# Patient Record
Sex: MALE | Race: WHITE | Employment: OTHER | URBAN - METROPOLITAN AREA
[De-identification: names, ages, dates, MRNs, and addresses within clinical notes are randomized per-mention and may not be internally consistent; named-entity substitution may affect disease eponyms.]

---

## 2016-03-15 LAB — HM COLONOSCOPY: NORMAL

## 2017-03-02 ENCOUNTER — TELEPHONE (OUTPATIENT)
Dept: SURGERY | Age: 67
End: 2017-03-02

## 2017-03-02 RX ORDER — AMOXICILLIN AND CLAVULANATE POTASSIUM 875; 125 MG/1; MG/1
1 TABLET, FILM COATED ORAL 2 TIMES DAILY
Qty: 28 TABLET | Refills: 1 | Status: SHIPPED | OUTPATIENT
Start: 2017-03-02 | End: 2017-03-16

## 2017-03-10 ENCOUNTER — OFFICE VISIT (OUTPATIENT)
Dept: SURGERY | Age: 67
End: 2017-03-10

## 2017-03-10 VITALS
BODY MASS INDEX: 28.91 KG/M2 | WEIGHT: 213.4 LBS | DIASTOLIC BLOOD PRESSURE: 88 MMHG | TEMPERATURE: 98.1 F | SYSTOLIC BLOOD PRESSURE: 188 MMHG | HEIGHT: 72 IN

## 2017-03-10 DIAGNOSIS — N49.2 SCROTAL WALL ABSCESS: Primary | ICD-10-CM

## 2017-03-10 PROCEDURE — 3017F COLORECTAL CA SCREEN DOC REV: CPT | Performed by: SURGERY

## 2017-03-10 PROCEDURE — 1123F ACP DISCUSS/DSCN MKR DOCD: CPT | Performed by: SURGERY

## 2017-03-10 PROCEDURE — G8427 DOCREV CUR MEDS BY ELIG CLIN: HCPCS | Performed by: SURGERY

## 2017-03-10 PROCEDURE — 4040F PNEUMOC VAC/ADMIN/RCVD: CPT | Performed by: SURGERY

## 2017-03-10 PROCEDURE — 99213 OFFICE O/P EST LOW 20 MIN: CPT | Performed by: SURGERY

## 2017-03-10 PROCEDURE — G8420 CALC BMI NORM PARAMETERS: HCPCS | Performed by: SURGERY

## 2017-03-10 PROCEDURE — G8484 FLU IMMUNIZE NO ADMIN: HCPCS | Performed by: SURGERY

## 2017-03-10 PROCEDURE — 1036F TOBACCO NON-USER: CPT | Performed by: SURGERY

## 2017-03-10 RX ORDER — AMLODIPINE BESYLATE 2.5 MG/1
2.5 TABLET ORAL DAILY
COMMUNITY
End: 2017-07-24

## 2017-06-06 LAB
ALBUMIN SERPL-MCNC: 4.5 G/DL
ALP BLD-CCNC: 62 U/L
ALT SERPL-CCNC: 19 U/L
ANION GAP SERPL CALCULATED.3IONS-SCNC: 15 MMOL/L
AST SERPL-CCNC: 24 U/L
BILIRUB SERPL-MCNC: 0.5 MG/DL (ref 0.1–1.4)
BUN BLDV-MCNC: 12 MG/DL
CALCIUM SERPL-MCNC: 9.4 MG/DL
CHLORIDE BLD-SCNC: 100 MMOL/L
CHOLESTEROL, TOTAL: 153 MG/DL
CHOLESTEROL/HDL RATIO: 2.78
CO2: 24 MMOL/L
CREAT SERPL-MCNC: 0.85 MG/DL
GFR CALCULATED: >60
GLUCOSE BLD-MCNC: 97 MG/DL
HDLC SERPL-MCNC: 55 MG/DL (ref 35–70)
LDL CHOLESTEROL CALCULATED: 78 MG/DL (ref 0–160)
POTASSIUM SERPL-SCNC: 4.5 MMOL/L
SODIUM BLD-SCNC: 139 MMOL/L
TOTAL PROTEIN: 7.4
TRIGL SERPL-MCNC: 100 MG/DL
VLDLC SERPL CALC-MCNC: 20 MG/DL

## 2017-07-24 ENCOUNTER — OFFICE VISIT (OUTPATIENT)
Dept: FAMILY MEDICINE CLINIC | Age: 67
End: 2017-07-24

## 2017-07-24 VITALS
TEMPERATURE: 99.7 F | SYSTOLIC BLOOD PRESSURE: 164 MMHG | HEIGHT: 72 IN | DIASTOLIC BLOOD PRESSURE: 100 MMHG | HEART RATE: 86 BPM | RESPIRATION RATE: 18 BRPM

## 2017-07-24 DIAGNOSIS — E66.3 OVERWEIGHT: ICD-10-CM

## 2017-07-24 DIAGNOSIS — B35.1 ONYCHOMYCOSIS: ICD-10-CM

## 2017-07-24 DIAGNOSIS — M47.812 ARTHROPATHY OF CERVICAL FACET JOINT: ICD-10-CM

## 2017-07-24 DIAGNOSIS — M79.672 PAIN IN BOTH FEET: ICD-10-CM

## 2017-07-24 DIAGNOSIS — M54.12 CERVICAL NERVE ROOT DISORDER: Primary | ICD-10-CM

## 2017-07-24 DIAGNOSIS — G47.33 OSA (OBSTRUCTIVE SLEEP APNEA): ICD-10-CM

## 2017-07-24 DIAGNOSIS — M79.671 PAIN IN BOTH FEET: ICD-10-CM

## 2017-07-24 PROCEDURE — 1123F ACP DISCUSS/DSCN MKR DOCD: CPT | Performed by: FAMILY MEDICINE

## 2017-07-24 PROCEDURE — G8419 CALC BMI OUT NRM PARAM NOF/U: HCPCS | Performed by: FAMILY MEDICINE

## 2017-07-24 PROCEDURE — 3017F COLORECTAL CA SCREEN DOC REV: CPT | Performed by: FAMILY MEDICINE

## 2017-07-24 PROCEDURE — G8427 DOCREV CUR MEDS BY ELIG CLIN: HCPCS | Performed by: FAMILY MEDICINE

## 2017-07-24 PROCEDURE — 4040F PNEUMOC VAC/ADMIN/RCVD: CPT | Performed by: FAMILY MEDICINE

## 2017-07-24 PROCEDURE — 99203 OFFICE O/P NEW LOW 30 MIN: CPT | Performed by: FAMILY MEDICINE

## 2017-07-24 PROCEDURE — 1036F TOBACCO NON-USER: CPT | Performed by: FAMILY MEDICINE

## 2017-07-24 RX ORDER — ROSUVASTATIN CALCIUM 5 MG/1
1 TABLET, COATED ORAL DAILY
COMMUNITY
Start: 2017-05-03 | End: 2018-01-29 | Stop reason: SDUPTHER

## 2017-07-24 RX ORDER — AMLODIPINE BESYLATE 5 MG/1
1 TABLET ORAL DAILY
COMMUNITY
Start: 2017-05-03 | End: 2018-01-29 | Stop reason: SDUPTHER

## 2017-10-20 ENCOUNTER — OFFICE VISIT (OUTPATIENT)
Dept: FAMILY MEDICINE CLINIC | Age: 67
End: 2017-10-20

## 2017-10-20 VITALS
HEIGHT: 72 IN | WEIGHT: 209 LBS | OXYGEN SATURATION: 97 % | RESPIRATION RATE: 18 BRPM | TEMPERATURE: 98.6 F | SYSTOLIC BLOOD PRESSURE: 125 MMHG | HEART RATE: 97 BPM | BODY MASS INDEX: 28.31 KG/M2 | DIASTOLIC BLOOD PRESSURE: 75 MMHG

## 2017-10-20 DIAGNOSIS — M54.12 CERVICAL NERVE ROOT DISORDER: ICD-10-CM

## 2017-10-20 DIAGNOSIS — I10 ESSENTIAL HYPERTENSION: Primary | ICD-10-CM

## 2017-10-20 DIAGNOSIS — F41.9 ANXIETY: ICD-10-CM

## 2017-10-20 DIAGNOSIS — G47.33 OSA (OBSTRUCTIVE SLEEP APNEA): ICD-10-CM

## 2017-10-20 DIAGNOSIS — M47.812 ARTHROPATHY OF CERVICAL FACET JOINT: ICD-10-CM

## 2017-10-20 DIAGNOSIS — E66.3 PATIENT OVERWEIGHT: ICD-10-CM

## 2017-10-20 PROCEDURE — 99214 OFFICE O/P EST MOD 30 MIN: CPT | Performed by: FAMILY MEDICINE

## 2017-10-20 PROCEDURE — 1036F TOBACCO NON-USER: CPT | Performed by: FAMILY MEDICINE

## 2017-10-20 PROCEDURE — G8417 CALC BMI ABV UP PARAM F/U: HCPCS | Performed by: FAMILY MEDICINE

## 2017-10-20 PROCEDURE — 1123F ACP DISCUSS/DSCN MKR DOCD: CPT | Performed by: FAMILY MEDICINE

## 2017-10-20 PROCEDURE — G8427 DOCREV CUR MEDS BY ELIG CLIN: HCPCS | Performed by: FAMILY MEDICINE

## 2017-10-20 PROCEDURE — 3017F COLORECTAL CA SCREEN DOC REV: CPT | Performed by: FAMILY MEDICINE

## 2017-10-20 PROCEDURE — G8484 FLU IMMUNIZE NO ADMIN: HCPCS | Performed by: FAMILY MEDICINE

## 2017-10-20 PROCEDURE — 4040F PNEUMOC VAC/ADMIN/RCVD: CPT | Performed by: FAMILY MEDICINE

## 2017-10-20 RX ORDER — LISINOPRIL 5 MG/1
5 TABLET ORAL DAILY
Qty: 30 TABLET | Refills: 3 | Status: SHIPPED | OUTPATIENT
Start: 2017-10-20 | End: 2017-11-17 | Stop reason: SDUPTHER

## 2017-10-20 RX ORDER — CITALOPRAM 10 MG/1
10 TABLET ORAL DAILY
Qty: 30 TABLET | Refills: 3 | Status: SHIPPED | OUTPATIENT
Start: 2017-10-20 | End: 2018-01-29 | Stop reason: SDUPTHER

## 2017-10-20 ASSESSMENT — PATIENT HEALTH QUESTIONNAIRE - PHQ9
SUM OF ALL RESPONSES TO PHQ9 QUESTIONS 1 & 2: 0
2. FEELING DOWN, DEPRESSED OR HOPELESS: 0
SUM OF ALL RESPONSES TO PHQ QUESTIONS 1-9: 0
1. LITTLE INTEREST OR PLEASURE IN DOING THINGS: 0

## 2017-10-20 NOTE — PATIENT INSTRUCTIONS
are a lot of ways to fit activity into your life. You can:  ¨ Walk or bike to the store. Or walk with a friend, or walk the dog. ¨ Mow the lawn, rake leaves, shovel snow, or do some gardening. ¨ Use the stairs instead of the elevator, at least for a few floors. · Change your thinking. Your thoughts have a lot to do with how you feel and what you do. When you're trying to reach a healthy weight, changing how you think about certain things may help. Here are some ideas:  ¨ Don't compare yourself to others. Healthy bodies come in all shapes and sizes. ¨ Pay attention to how hungry or full you feel. When you eat, be aware of why you're eating and how much you're eating. ¨ Focus on improving your health instead of dieting. Dieting almost never works over the long term. · Ask your doctor about other health professionals who can help you reach a healthy weight. ¨ A dietitian can help you make healthy changes in your diet. ¨ An exercise specialist or  can help you develop a safe and effective exercise program.  ¨ A counselor or psychiatrist can help you cope with issues such as depression, anxiety, or family problems that can make it hard to focus on reaching a healthy weight. · Get support from your family, your doctor, your friends, a support groupand support yourself. Where can you learn more? Go to [email or web address].[email or web address] org and sign in to your [employer] account. Enter F057 in the [facility] box to learn more about \"When You Are Overweight: Care Instructions. \"     If you do not have an account, please click on the \"Sign Up Now\" link. Current as of: October 13, 2016  Content Version: 11.3  © 4223-0900 [employer], [employer]. Care instructions adapted under license by [facility] ([facility]).  If you have questions about a medical condition or this instruction, always ask your healthcare professional. [address]  any warranty or liability for your use of

## 2017-10-20 NOTE — PROGRESS NOTES
or wheezing  Cardiovascular ROS: no chest pain or dyspnea on exertion  Gastrointestinal ROS: no abdominal pain, change in bowel habits, or black or bloody stools  Genito-Urinary ROS: no dysuria, trouble voiding, or hematuria  Musculoskeletal ROS: pain  Neurological ROS: no TIA or stroke symptoms  Dermatological ROS: negative    Blood pressure 125/75, pulse 97, temperature 98.6 °F (37 °C), temperature source Temporal, resp. rate 18, height 6' (1.829 m), weight 209 lb (94.8 kg), SpO2 97 %. Physical Examination: General appearance - alert, well appearing, and in no distress  Mental status - alert, oriented to person, place, and time  Eyes - pupils equal and reactive, extraocular eye movements intact  Ears - bilateral TM's and external ear canals normal  Mouth - mucous membranes moist, pharynx normal without lesions  Neck - supple, no significant adenopathy  Lymphatics - no palpable lymphadenopathy, no hepatosplenomegaly  Chest - clear to auscultation, no wheezes, rales or rhonchi, symmetric air entry  Heart - normal rate, regular rhythm, normal S1, S2, no murmurs, rubs, clicks or gallops  Abdomen - soft, nontender, nondistended, no masses or organomegaly  Neurological - alert, oriented, normal speech, no focal findings or movement disorder noted  Musculoskeletal - no joint tenderness, deformity or swelling  Extremities - peripheral pulses normal, no pedal edema, no clubbing or cyanosis  Skin - nail fungus, o/w normal coloration and turgor, no rashes, no suspicious skin lesions noted;    Assessment:    1. Essential hypertension     2. Anxiety     3. Cervical nerve root disorder     4. Arthropathy of cervical facet joint     5. MUNA (obstructive sleep apnea)     6. Patient overweight         Plan:    No orders of the defined types were placed in this encounter.       Outpatient Encounter Prescriptions as of 10/20/2017   Medication Sig Dispense Refill    Docosahexaenoic Acid-EPA (OMEGA-3) 180-270 MG CAPS Take by mouth

## 2017-11-17 ENCOUNTER — OFFICE VISIT (OUTPATIENT)
Dept: FAMILY MEDICINE CLINIC | Age: 67
End: 2017-11-17

## 2017-11-17 VITALS
BODY MASS INDEX: 28.31 KG/M2 | TEMPERATURE: 98.5 F | OXYGEN SATURATION: 96 % | HEIGHT: 72 IN | HEART RATE: 95 BPM | SYSTOLIC BLOOD PRESSURE: 145 MMHG | DIASTOLIC BLOOD PRESSURE: 80 MMHG | RESPIRATION RATE: 18 BRPM | WEIGHT: 209 LBS

## 2017-11-17 DIAGNOSIS — I10 ESSENTIAL HYPERTENSION: ICD-10-CM

## 2017-11-17 DIAGNOSIS — B35.1 ONYCHOMYCOSIS: ICD-10-CM

## 2017-11-17 DIAGNOSIS — G47.33 OSA (OBSTRUCTIVE SLEEP APNEA): Primary | ICD-10-CM

## 2017-11-17 DIAGNOSIS — M47.812 ARTHROPATHY OF CERVICAL FACET JOINT: ICD-10-CM

## 2017-11-17 DIAGNOSIS — E66.3 PATIENT OVERWEIGHT: ICD-10-CM

## 2017-11-17 DIAGNOSIS — M54.12 CERVICAL NERVE ROOT DISORDER: ICD-10-CM

## 2017-11-17 DIAGNOSIS — M79.672 PAIN IN BOTH FEET: ICD-10-CM

## 2017-11-17 DIAGNOSIS — M79.671 PAIN IN BOTH FEET: ICD-10-CM

## 2017-11-17 PROCEDURE — 99214 OFFICE O/P EST MOD 30 MIN: CPT | Performed by: FAMILY MEDICINE

## 2017-11-17 PROCEDURE — G8417 CALC BMI ABV UP PARAM F/U: HCPCS | Performed by: FAMILY MEDICINE

## 2017-11-17 PROCEDURE — 1036F TOBACCO NON-USER: CPT | Performed by: FAMILY MEDICINE

## 2017-11-17 PROCEDURE — 4040F PNEUMOC VAC/ADMIN/RCVD: CPT | Performed by: FAMILY MEDICINE

## 2017-11-17 PROCEDURE — 1123F ACP DISCUSS/DSCN MKR DOCD: CPT | Performed by: FAMILY MEDICINE

## 2017-11-17 PROCEDURE — 3017F COLORECTAL CA SCREEN DOC REV: CPT | Performed by: FAMILY MEDICINE

## 2017-11-17 PROCEDURE — G8484 FLU IMMUNIZE NO ADMIN: HCPCS | Performed by: FAMILY MEDICINE

## 2017-11-17 PROCEDURE — G8427 DOCREV CUR MEDS BY ELIG CLIN: HCPCS | Performed by: FAMILY MEDICINE

## 2017-11-17 RX ORDER — LISINOPRIL 10 MG/1
10 TABLET ORAL DAILY
Qty: 30 TABLET | Refills: 5 | Status: SHIPPED | OUTPATIENT
Start: 2017-11-17 | End: 2018-01-29 | Stop reason: SDUPTHER

## 2017-11-17 NOTE — PROGRESS NOTES
Chief Complaint   Patient presents with    Hypertension     4 wk f/u    Medication Check   hi bp today, recommend medication adjustment  good at home  He agrees with med changes  discuss risks  will monitor at home    overweight condition still  stable  rec weight loss    neck pain ultimately stable  had neck issues  now better    corrine is stable  sees pulmonary  no new issues    foot pain bilat no real change  consider podiatry    toenail fungus  consider podiatry    hip pain bilat  worse on right  may need ortho eval  declines at this time    Patient presents for  exam.        Patient Active Problem List   Diagnosis    Scrotal wall abscess    Cervical nerve root disorder    Arthropathy of cervical facet joint    CORRINE (obstructive sleep apnea)    Patient overweight    Onychomycosis    Pain in both feet       has a current medication list which includes the following prescription(s): lisinopril, omega-3, citalopram, amlodipine, rosuvastatin, naproxen sodium, aspirin, and metoprolol succinate. History reviewed. No pertinent past medical history. Past Surgical History:   Procedure Laterality Date    CATARACT REMOVAL Left 03/08/2017    CERVICAL FUSION  1998    COLONOSCOPY  2016    COLONOSCOPY  2013    LAMINECTOMY  1988    L4    TONSILLECTOMY         family history is not on file. Social History     Social History    Marital status:      Spouse name: N/A    Number of children: N/A    Years of education: N/A     Occupational History    Not on file.      Social History Main Topics    Smoking status: Never Smoker    Smokeless tobacco: Never Used    Alcohol use Not on file    Drug use: Unknown    Sexual activity: Not on file     Other Topics Concern    Not on file     Social History Narrative    No narrative on file       Allergies   Allergen Reactions    Azithromycin Rash       Review of Systems - General ROS: negative  Psychological ROS: negative  ENT ROS: negative  Hematological and Lymphatic ROS: negative  Endocrine ROS: negative  Respiratory ROS: no cough, shortness of breath, or wheezing  Cardiovascular ROS: no chest pain or dyspnea on exertion  Gastrointestinal ROS: no abdominal pain, change in bowel habits, or black or bloody stools  Genito-Urinary ROS: no dysuria, trouble voiding, or hematuria  Musculoskeletal ROS: pain  Neurological ROS: no TIA or stroke symptoms  Dermatological ROS: negative    Blood pressure (!) 145/80, pulse 95, temperature 98.5 °F (36.9 °C), temperature source Temporal, resp. rate 18, height 6' (1.829 m), weight 209 lb (94.8 kg), SpO2 96 %. Physical Examination: General appearance - alert, well appearing, and in no distress  Mental status - alert, oriented to person, place, and time  Eyes - pupils equal and reactive, extraocular eye movements intact  Ears - bilateral TM's and external ear canals normal  Mouth - mucous membranes moist, pharynx normal without lesions  Neck - supple, no significant adenopathy  Lymphatics - no palpable lymphadenopathy, no hepatosplenomegaly  Chest - clear to auscultation, no wheezes, rales or rhonchi, symmetric air entry  Heart - normal rate, regular rhythm, normal S1, S2, no murmurs, rubs, clicks or gallops  Abdomen - soft, nontender, nondistended, no masses or organomegaly  Neurological - alert, oriented, normal speech, no focal findings or movement disorder noted  Musculoskeletal - no joint tenderness, deformity or swelling  Extremities - peripheral pulses normal, no pedal edema, no clubbing or cyanosis  Skin - nail fungus, o/w normal coloration and turgor, no rashes, no suspicious skin lesions noted;    Assessment:    1. MUNA (obstructive sleep apnea)     2. Patient overweight     3. Onychomycosis     4. Pain in both feet     5. Arthropathy of cervical facet joint     6. Cervical nerve root disorder     7. Essential hypertension         Plan:    No orders of the defined types were placed in this encounter.       Outpatient Encounter Prescriptions as of 11/17/2017   Medication Sig Dispense Refill    lisinopril (PRINIVIL;ZESTRIL) 10 MG tablet Take 1 tablet by mouth daily 30 tablet 5    Docosahexaenoic Acid-EPA (OMEGA-3) 180-270 MG CAPS Take by mouth      citalopram (CELEXA) 10 MG tablet Take 1 tablet by mouth daily 30 tablet 3    amLODIPine (NORVASC) 5 MG tablet Take 1 tablet by mouth daily      rosuvastatin (CRESTOR) 5 MG tablet Take 1 tablet by mouth daily      Naproxen Sodium (ALEVE PO) Take 1 tablet by mouth 2 times daily      aspirin 81 MG EC tablet Take 81 mg by mouth      metoprolol (TOPROL-XL) 50 MG XL tablet       [DISCONTINUED] lisinopril (PRINIVIL;ZESTRIL) 5 MG tablet Take 1 tablet by mouth daily 30 tablet 3     No facility-administered encounter medications on file as of 11/17/2017. keep specialist eval  review bw  document prior colon exam    Return in about 3 months (around 2/17/2018). Patient education provided. They understand and agree with this course of treatment. They will return with new or worsening symptoms. Patient instructed to remain current with appropriate annual health maintenance.

## 2017-11-17 NOTE — PATIENT INSTRUCTIONS
katena, and be sure to contact your doctor if:  · You would like help planning heart-healthy meals. Where can you learn more? Go to [email or web address] and sign in to your [facility] account. Enter V137 in the [employer] box to learn more about \"Heart-Healthy Diet: Care Instructions. \"     If you do not have an account, please click on the \"Sign Up Now\" link. Current as of: April 3, 2017  Content Version: 11.3  © 8302-8058 [employer]. Care instructions adapted under license by [facility] ([facility]). If you have questions about a medical condition or this instruction, always ask your healthcare professional. [address] any warranty or liability for your use of this information.

## 2018-01-29 ENCOUNTER — OFFICE VISIT (OUTPATIENT)
Dept: FAMILY MEDICINE CLINIC | Age: 68
End: 2018-01-29
Payer: MEDICARE

## 2018-01-29 VITALS
HEIGHT: 72 IN | HEART RATE: 78 BPM | RESPIRATION RATE: 16 BRPM | OXYGEN SATURATION: 96 % | DIASTOLIC BLOOD PRESSURE: 85 MMHG | BODY MASS INDEX: 27.9 KG/M2 | TEMPERATURE: 98 F | SYSTOLIC BLOOD PRESSURE: 135 MMHG | WEIGHT: 206 LBS

## 2018-01-29 DIAGNOSIS — F41.1 GAD (GENERALIZED ANXIETY DISORDER): ICD-10-CM

## 2018-01-29 DIAGNOSIS — I10 ESSENTIAL HYPERTENSION: ICD-10-CM

## 2018-01-29 DIAGNOSIS — E78.2 MIXED HYPERLIPIDEMIA: ICD-10-CM

## 2018-01-29 DIAGNOSIS — E66.3 PATIENT OVERWEIGHT: ICD-10-CM

## 2018-01-29 DIAGNOSIS — M16.11 PRIMARY OSTEOARTHRITIS OF RIGHT HIP: ICD-10-CM

## 2018-01-29 DIAGNOSIS — G47.33 OSA (OBSTRUCTIVE SLEEP APNEA): Primary | ICD-10-CM

## 2018-01-29 DIAGNOSIS — F33.1 MODERATE EPISODE OF RECURRENT MAJOR DEPRESSIVE DISORDER (HCC): ICD-10-CM

## 2018-01-29 PROCEDURE — 99214 OFFICE O/P EST MOD 30 MIN: CPT | Performed by: FAMILY MEDICINE

## 2018-01-29 PROCEDURE — G8484 FLU IMMUNIZE NO ADMIN: HCPCS | Performed by: FAMILY MEDICINE

## 2018-01-29 PROCEDURE — G8417 CALC BMI ABV UP PARAM F/U: HCPCS | Performed by: FAMILY MEDICINE

## 2018-01-29 PROCEDURE — 3017F COLORECTAL CA SCREEN DOC REV: CPT | Performed by: FAMILY MEDICINE

## 2018-01-29 PROCEDURE — G8427 DOCREV CUR MEDS BY ELIG CLIN: HCPCS | Performed by: FAMILY MEDICINE

## 2018-01-29 PROCEDURE — 4040F PNEUMOC VAC/ADMIN/RCVD: CPT | Performed by: FAMILY MEDICINE

## 2018-01-29 PROCEDURE — 1036F TOBACCO NON-USER: CPT | Performed by: FAMILY MEDICINE

## 2018-01-29 PROCEDURE — 1123F ACP DISCUSS/DSCN MKR DOCD: CPT | Performed by: FAMILY MEDICINE

## 2018-01-29 RX ORDER — CITALOPRAM 10 MG/1
10 TABLET ORAL DAILY
Qty: 90 TABLET | Refills: 3 | Status: SHIPPED | OUTPATIENT
Start: 2018-01-29 | End: 2019-01-14 | Stop reason: SDUPTHER

## 2018-01-29 RX ORDER — ROSUVASTATIN CALCIUM 5 MG/1
5 TABLET, COATED ORAL DAILY
Qty: 90 TABLET | Refills: 3 | Status: SHIPPED | OUTPATIENT
Start: 2018-01-29 | End: 2019-01-14 | Stop reason: SDUPTHER

## 2018-01-29 RX ORDER — LISINOPRIL 10 MG/1
10 TABLET ORAL DAILY
Qty: 90 TABLET | Refills: 3 | Status: SHIPPED | OUTPATIENT
Start: 2018-01-29 | End: 2019-01-14 | Stop reason: SDUPTHER

## 2018-01-29 RX ORDER — AMLODIPINE BESYLATE 5 MG/1
5 TABLET ORAL DAILY
Qty: 90 TABLET | Refills: 3 | Status: SHIPPED | OUTPATIENT
Start: 2018-01-29 | End: 2019-01-14 | Stop reason: SDUPTHER

## 2018-01-29 NOTE — PATIENT INSTRUCTIONS
Patient Education        When You Are Overweight: Care Instructions  Your Care Instructions    If you're overweight, your doctor may recommend that you make changes in your eating and exercise habits. Being overweight can lead to serious health problems, such as high blood pressure, heart disease, type 2 diabetes, and arthritis, or it can make these problems worse. Eating a healthy diet and being more active can help you reach and stay at a healthy weight. You don't have to make huge changes all at once. Start by making small changes in your eating and exercise habits. To lose weight, you need to burn more calories than you take in. You can do this by eating healthy foods in reasonable amounts and becoming more active every day. Follow-up care is a key part of your treatment and safety. Be sure to make and go to all appointments, and call your doctor if you are having problems. It's also a good idea to know your test results and keep a list of the medicines you take. How can you care for yourself at home? · Improve your eating habits. You'll be more successful if you work on changing one eating habit at a time. All foods, if eaten in moderation, can be part of healthy eating. Remember to:  [facility] a variety of foods from each food group. Include grains, vegetables, fruits, dairy, and protein foods. ¨ Limit foods high in fat, sugar, and calories. ¨ Eat slowly. And don't do anything else, such as watch TV, while you are eating. ¨ Pay attention to portion sizes. Put your food on a smaller plate. ¨ Plan your meals ahead of time. You'll be less likely to grab something that's not as healthy. · Get active. Regular activity can help you feel better, have more energy, and burn more calories. If you haven't been active, start slowly. Start with at least 30 minutes of moderate activity on most days of the week. Then gradually increase the amount of activity. Try for 60 or 90 minutes a day, at least 5 days a week. use of this information.

## 2018-01-29 NOTE — PROGRESS NOTES
Chief Complaint   Patient presents with    Hypertension     3 MO F/U    Sleep Apnea    blood pressure stable  good at home  He elevates medication   will monitor at home    overweight condition still stable  Discussed nutrition  rec weight loss    neck pain relatively stable  had neck issues  now better    corrine is also stable  sees pulmonary  no new issues    foot pain bilat no real change  consider podiatry    toenail fungus  consider podiatry    hip pain bilat has been ongoing  worse on right  may need ortho eval  declines at this time    Patient presents for  exam.        Patient Active Problem List   Diagnosis    Scrotal wall abscess    Cervical nerve root disorder    Arthropathy of cervical facet joint    CORRINE (obstructive sleep apnea)    Patient overweight    Onychomycosis    Pain in both feet    Mixed hyperlipidemia       has a current medication list which includes the following prescription(s): lisinopril, citalopram, amlodipine, rosuvastatin, naproxen sodium, aspirin, and metoprolol succinate. History reviewed. No pertinent past medical history. Past Surgical History:   Procedure Laterality Date    CATARACT REMOVAL Left 03/08/2017    CERVICAL FUSION  1998    COLONOSCOPY  2016    COLONOSCOPY  2013    LAMINECTOMY  1988    L4    TONSILLECTOMY         family history is not on file. Social History     Social History    Marital status:      Spouse name: N/A    Number of children: N/A    Years of education: N/A     Occupational History    Not on file.      Social History Main Topics    Smoking status: Never Smoker    Smokeless tobacco: Never Used    Alcohol use Not on file    Drug use: Unknown    Sexual activity: Not on file     Other Topics Concern    Not on file     Social History Narrative    No narrative on file       Allergies   Allergen Reactions    Azithromycin Rash       Review of Systems - General ROS: negative  Psychological ROS: negative  ENT ROS: orders of the defined types were placed in this encounter. Outpatient Encounter Prescriptions as of 1/29/2018   Medication Sig Dispense Refill    lisinopril (PRINIVIL;ZESTRIL) 10 MG tablet Take 1 tablet by mouth daily 90 tablet 3    citalopram (CELEXA) 10 MG tablet Take 1 tablet by mouth daily 90 tablet 3    amLODIPine (NORVASC) 5 MG tablet Take 1 tablet by mouth daily 90 tablet 3    rosuvastatin (CRESTOR) 5 MG tablet Take 1 tablet by mouth daily 90 tablet 3    Naproxen Sodium (ALEVE PO) Take 1 tablet by mouth 2 times daily      aspirin 81 MG EC tablet Take 81 mg by mouth      metoprolol (TOPROL-XL) 50 MG XL tablet       [DISCONTINUED] lisinopril (PRINIVIL;ZESTRIL) 10 MG tablet Take 1 tablet by mouth daily 30 tablet 5    [DISCONTINUED] Docosahexaenoic Acid-EPA (OMEGA-3) 180-270 MG CAPS Take by mouth      [DISCONTINUED] citalopram (CELEXA) 10 MG tablet Take 1 tablet by mouth daily 30 tablet 3    [DISCONTINUED] amLODIPine (NORVASC) 5 MG tablet Take 1 tablet by mouth daily      [DISCONTINUED] rosuvastatin (CRESTOR) 5 MG tablet Take 1 tablet by mouth daily       No facility-administered encounter medications on file as of 1/29/2018. keep specialist eval  review bw  document prior colon exam    Return in about 3 months (around 4/29/2018). Patient education provided. They understand and agree with this course of treatment. They will return with new or worsening symptoms. Patient instructed to remain current with appropriate annual health maintenance.

## 2018-01-29 NOTE — LETTER
[facility]  [address] [address]  Phone: [phone]  Fax: [phone]    Mae Rehman MD        January 29, 2018     Patient: Sloan Campbell   YOB: 1950   Date of Visit: 1/29/2018       To Whom It May Concern: It is my medical opinion that Sloan Campbell requires a disability parking placard for the following reasons:  He cannot walk 200 feet without stopping to rest.  Duration of need: permanent    If you have any questions or concerns, please don't hesitate to call.     Sincerely,        Mae Rehman MD

## 2018-04-17 ENCOUNTER — OFFICE VISIT (OUTPATIENT)
Dept: PULMONOLOGY | Age: 68
End: 2018-04-17
Payer: MEDICARE

## 2018-04-17 ENCOUNTER — TELEPHONE (OUTPATIENT)
Dept: FAMILY MEDICINE CLINIC | Age: 68
End: 2018-04-17

## 2018-04-17 VITALS
RESPIRATION RATE: 16 BRPM | OXYGEN SATURATION: 98 % | TEMPERATURE: 97.7 F | HEIGHT: 72 IN | SYSTOLIC BLOOD PRESSURE: 122 MMHG | DIASTOLIC BLOOD PRESSURE: 74 MMHG | BODY MASS INDEX: 28.5 KG/M2 | HEART RATE: 64 BPM | WEIGHT: 210.4 LBS

## 2018-04-17 DIAGNOSIS — E66.3 OVERWEIGHT (BMI 25.0-29.9): ICD-10-CM

## 2018-04-17 DIAGNOSIS — G47.33 OSA (OBSTRUCTIVE SLEEP APNEA): Primary | ICD-10-CM

## 2018-04-17 PROCEDURE — 4040F PNEUMOC VAC/ADMIN/RCVD: CPT | Performed by: INTERNAL MEDICINE

## 2018-04-17 PROCEDURE — 99214 OFFICE O/P EST MOD 30 MIN: CPT | Performed by: INTERNAL MEDICINE

## 2018-04-17 PROCEDURE — 1036F TOBACCO NON-USER: CPT | Performed by: INTERNAL MEDICINE

## 2018-04-17 PROCEDURE — G8427 DOCREV CUR MEDS BY ELIG CLIN: HCPCS | Performed by: INTERNAL MEDICINE

## 2018-04-17 PROCEDURE — 1123F ACP DISCUSS/DSCN MKR DOCD: CPT | Performed by: INTERNAL MEDICINE

## 2018-04-17 PROCEDURE — G8417 CALC BMI ABV UP PARAM F/U: HCPCS | Performed by: INTERNAL MEDICINE

## 2018-04-17 PROCEDURE — 3017F COLORECTAL CA SCREEN DOC REV: CPT | Performed by: INTERNAL MEDICINE

## 2018-04-17 RX ORDER — CLINDAMYCIN HYDROCHLORIDE 150 MG/1
CAPSULE ORAL
COMMUNITY
Start: 2018-04-13 | End: 2018-07-26

## 2018-04-17 ASSESSMENT — ENCOUNTER SYMPTOMS
ABDOMINAL PAIN: 0
COUGH: 0
SHORTNESS OF BREATH: 0
EYE ITCHING: 0
SORE THROAT: 0
VOICE CHANGE: 0
RHINORRHEA: 0
DIARRHEA: 0
WHEEZING: 0
CHEST TIGHTNESS: 0
VOMITING: 0
NAUSEA: 0

## 2018-04-19 ENCOUNTER — HOSPITAL ENCOUNTER (OUTPATIENT)
Dept: GENERAL RADIOLOGY | Age: 68
Discharge: HOME | End: 2018-04-21
Payer: MEDICARE

## 2018-04-19 VITALS — SYSTOLIC BLOOD PRESSURE: 169 MMHG | DIASTOLIC BLOOD PRESSURE: 89 MMHG | HEART RATE: 86 BPM

## 2018-04-19 DIAGNOSIS — M16.11 OSTEOARTHRITIS OF RIGHT HIP, UNSPECIFIED OSTEOARTHRITIS TYPE: ICD-10-CM

## 2018-04-19 PROCEDURE — 6360000004 HC RX CONTRAST MEDICATION: Performed by: RADIOLOGY

## 2018-04-19 PROCEDURE — 6360000002 HC RX W HCPCS: Performed by: RADIOLOGY

## 2018-04-19 PROCEDURE — 2500000003 HC RX 250 WO HCPCS: Performed by: RADIOLOGY

## 2018-04-19 PROCEDURE — 20610 DRAIN/INJ JOINT/BURSA W/O US: CPT

## 2018-04-19 RX ORDER — LIDOCAINE HYDROCHLORIDE 20 MG/ML
20 INJECTION, SOLUTION INFILTRATION; PERINEURAL ONCE
Status: COMPLETED | OUTPATIENT
Start: 2018-04-19 | End: 2018-04-19

## 2018-04-19 RX ORDER — TRIAMCINOLONE ACETONIDE 40 MG/ML
40 INJECTION, SUSPENSION INTRA-ARTICULAR; INTRAMUSCULAR ONCE
Status: COMPLETED | OUTPATIENT
Start: 2018-04-19 | End: 2018-04-19

## 2018-04-19 RX ORDER — BUPIVACAINE HYDROCHLORIDE 5 MG/ML
30 INJECTION, SOLUTION EPIDURAL; INTRACAUDAL ONCE
Status: COMPLETED | OUTPATIENT
Start: 2018-04-19 | End: 2018-04-19

## 2018-04-19 RX ADMIN — LIDOCAINE HYDROCHLORIDE 9 ML: 20 INJECTION, SOLUTION INFILTRATION; PERINEURAL at 09:05

## 2018-04-19 RX ADMIN — IOVERSOL 1 ML: 678 INJECTION INTRA-ARTERIAL; INTRAVENOUS at 09:07

## 2018-04-19 RX ADMIN — TRIAMCINOLONE ACETONIDE 80 MG: 40 INJECTION, SUSPENSION INTRA-ARTICULAR; INTRAMUSCULAR at 09:05

## 2018-04-19 RX ADMIN — BUPIVACAINE HYDROCHLORIDE 3 MG: 5 INJECTION, SOLUTION EPIDURAL; INTRACAUDAL at 09:06

## 2018-04-30 ENCOUNTER — OFFICE VISIT (OUTPATIENT)
Dept: FAMILY MEDICINE CLINIC | Age: 68
End: 2018-04-30
Payer: MEDICARE

## 2018-04-30 VITALS
TEMPERATURE: 97.8 F | DIASTOLIC BLOOD PRESSURE: 82 MMHG | HEART RATE: 80 BPM | HEIGHT: 72 IN | BODY MASS INDEX: 27.63 KG/M2 | WEIGHT: 204 LBS | SYSTOLIC BLOOD PRESSURE: 134 MMHG | RESPIRATION RATE: 16 BRPM

## 2018-04-30 DIAGNOSIS — M79.671 PAIN IN BOTH FEET: ICD-10-CM

## 2018-04-30 DIAGNOSIS — E66.3 PATIENT OVERWEIGHT: ICD-10-CM

## 2018-04-30 DIAGNOSIS — B35.1 ONYCHOMYCOSIS: ICD-10-CM

## 2018-04-30 DIAGNOSIS — I10 ESSENTIAL HYPERTENSION: ICD-10-CM

## 2018-04-30 DIAGNOSIS — Z12.5 PROSTATE CANCER SCREENING: ICD-10-CM

## 2018-04-30 DIAGNOSIS — E78.2 MIXED HYPERLIPIDEMIA: Primary | ICD-10-CM

## 2018-04-30 DIAGNOSIS — M79.672 PAIN IN BOTH FEET: ICD-10-CM

## 2018-04-30 DIAGNOSIS — G47.33 OSA (OBSTRUCTIVE SLEEP APNEA): ICD-10-CM

## 2018-04-30 PROCEDURE — 1036F TOBACCO NON-USER: CPT | Performed by: FAMILY MEDICINE

## 2018-04-30 PROCEDURE — G8417 CALC BMI ABV UP PARAM F/U: HCPCS | Performed by: FAMILY MEDICINE

## 2018-04-30 PROCEDURE — G8427 DOCREV CUR MEDS BY ELIG CLIN: HCPCS | Performed by: FAMILY MEDICINE

## 2018-04-30 PROCEDURE — 4040F PNEUMOC VAC/ADMIN/RCVD: CPT | Performed by: FAMILY MEDICINE

## 2018-04-30 PROCEDURE — 3017F COLORECTAL CA SCREEN DOC REV: CPT | Performed by: FAMILY MEDICINE

## 2018-04-30 PROCEDURE — 99214 OFFICE O/P EST MOD 30 MIN: CPT | Performed by: FAMILY MEDICINE

## 2018-04-30 PROCEDURE — 1123F ACP DISCUSS/DSCN MKR DOCD: CPT | Performed by: FAMILY MEDICINE

## 2018-04-30 RX ORDER — METOPROLOL SUCCINATE 50 MG/1
50 TABLET, EXTENDED RELEASE ORAL DAILY
Qty: 30 TABLET | Refills: 5 | Status: SHIPPED | OUTPATIENT
Start: 2018-04-30 | End: 2018-08-20 | Stop reason: SDUPTHER

## 2018-06-14 ENCOUNTER — TELEPHONE (OUTPATIENT)
Dept: FAMILY MEDICINE CLINIC | Age: 68
End: 2018-06-14

## 2018-07-16 ENCOUNTER — OFFICE VISIT (OUTPATIENT)
Dept: FAMILY MEDICINE CLINIC | Age: 68
End: 2018-07-16
Payer: MEDICARE

## 2018-07-16 VITALS
BODY MASS INDEX: 28.04 KG/M2 | HEIGHT: 72 IN | RESPIRATION RATE: 16 BRPM | SYSTOLIC BLOOD PRESSURE: 132 MMHG | TEMPERATURE: 98.2 F | DIASTOLIC BLOOD PRESSURE: 80 MMHG | WEIGHT: 207 LBS | HEART RATE: 80 BPM

## 2018-07-16 DIAGNOSIS — M25.551 RIGHT HIP PAIN: ICD-10-CM

## 2018-07-16 DIAGNOSIS — Z01.818 PREOP GENERAL PHYSICAL EXAM: Primary | ICD-10-CM

## 2018-07-16 PROCEDURE — 3017F COLORECTAL CA SCREEN DOC REV: CPT | Performed by: FAMILY MEDICINE

## 2018-07-16 PROCEDURE — 1123F ACP DISCUSS/DSCN MKR DOCD: CPT | Performed by: FAMILY MEDICINE

## 2018-07-16 PROCEDURE — G8427 DOCREV CUR MEDS BY ELIG CLIN: HCPCS | Performed by: FAMILY MEDICINE

## 2018-07-16 PROCEDURE — 1036F TOBACCO NON-USER: CPT | Performed by: FAMILY MEDICINE

## 2018-07-16 PROCEDURE — 99214 OFFICE O/P EST MOD 30 MIN: CPT | Performed by: FAMILY MEDICINE

## 2018-07-16 PROCEDURE — 4040F PNEUMOC VAC/ADMIN/RCVD: CPT | Performed by: FAMILY MEDICINE

## 2018-07-16 PROCEDURE — 1101F PT FALLS ASSESS-DOCD LE1/YR: CPT | Performed by: FAMILY MEDICINE

## 2018-07-16 PROCEDURE — G8417 CALC BMI ABV UP PARAM F/U: HCPCS | Performed by: FAMILY MEDICINE

## 2018-07-16 NOTE — PATIENT INSTRUCTIONS
Patient Education        Back Pain: Care Instructions  Your Care Instructions    Back pain has many possible causes. It is often related to problems with muscles and ligaments of the back. It may also be related to problems with the nerves, discs, or bones of the back. Moving, lifting, standing, sitting, or sleeping in an awkward way can strain the back. Sometimes you don't notice the injury until later. Arthritis is another common cause of back pain. Although it may hurt a lot, back pain usually improves on its own within several weeks. Most people recover in 12 weeks or less. Using good home treatment and being careful not to stress your back can help you feel better sooner. Follow-up care is a key part of your treatment and safety. Be sure to make and go to all appointments, and call your doctor if you are having problems. It's also a good idea to know your test results and keep a list of the medicines you take. How can you care for yourself at home? · Sit or lie in positions that are most comfortable and reduce your pain. Try one of these positions when you lie down:  ¨ Lie on your back with your knees bent and supported by large pillows. ¨ Lie on the floor with your legs on the seat of a sofa or chair. Kyung Lesser on your side with your knees and hips bent and a pillow between your legs. ¨ Lie on your stomach if it does not make pain worse. · Do not sit up in bed, and avoid soft couches and twisted positions. Bed rest can help relieve pain at first, but it delays healing. Avoid bed rest after the first day of back pain. · Change positions every 30 minutes. If you must sit for long periods of time, take breaks from sitting. Get up and walk around, or lie in a comfortable position. · Try using a heating pad on a low or medium setting for 15 to 20 minutes every 2 or 3 hours. Try a warm shower in place of one session with the heating pad.   · You can also try an ice pack for 10 to 15 minutes every 2 to 3 hours. Put a thin cloth between the ice pack and your skin. · Take pain medicines exactly as directed. ¨ If the doctor gave you a prescription medicine for pain, take it as prescribed. ¨ If you are not taking a prescription pain medicine, ask your doctor if you can take an over-the-counter medicine. · Take short walks several times a day. You can start with 5 to 10 minutes, 3 or 4 times a day, and work up to longer walks. Walk on level surfaces and avoid hills and stairs until your back is better. · Return to work and other activities as soon as you can. Continued rest without activity is usually not good for your back. · To prevent future back pain, do exercises to stretch and strengthen your back and stomach. Learn how to use good posture, safe lifting techniques, and proper body mechanics. When should you call for help? Call your doctor now or seek immediate medical care if:    · You have new or worsening numbness in your legs.     · You have new or worsening weakness in your legs. (This could make it hard to stand up.)     · You lose control of your bladder or bowels.    Watch closely for changes in your health, and be sure to contact your doctor if:    · Your pain gets worse.     · You are not getting better after 2 weeks. Where can you learn more? Go to [email or web address] org and sign in to your [facility] account. Enter P120 in the EventRegist box to learn more about \"Back Pain: Care Instructions. \"     If you do not have an account, please click on the \"Sign Up Now\" link. Current as of: November 29, 2017  Content Version: 11.6  © 8717-3498 [email or web address], [employer]. Care instructions adapted under license by [facility] ([facility]). If you have questions about a medical condition or this instruction, always ask your healthcare professional. [address] any warranty or liability for your use of this information.

## 2018-07-16 NOTE — PROGRESS NOTES
Chief Complaint   Patient presents with    Pre-op Exam     med clearance for right total hip replacement on 8/3/2018 with Dr. Patrice Landis at [facility] [place]        Patient was referred to me for a preoperative evaluation prior to rthr with Dr. Tra Brown at [facility]. The surgery is scheduled for 8/3/2018. Reactions to anesthesia: none    History of excessive bleeding: none    History of blood clots: none    History of blood transfusions: none      Reactions to blood transfusion: none     History reviewed. No pertinent past medical history. Past Surgical History:   Procedure Laterality Date    CATARACT REMOVAL Left 03/08/2017    CERVICAL FUSION  1998    COLONOSCOPY  2016    COLONOSCOPY  2013    LAMINECTOMY  1988    L4    TONSILLECTOMY         Current Outpatient Prescriptions   Medication Sig Dispense Refill    metoprolol succinate (TOPROL XL) 50 MG extended release tablet Take 1 tablet by mouth daily 30 tablet 5    clindamycin (CLEOCIN) 150 MG capsule       lisinopril (PRINIVIL;ZESTRIL) 10 MG tablet Take 1 tablet by mouth daily 90 tablet 3    citalopram (CELEXA) 10 MG tablet Take 1 tablet by mouth daily 90 tablet 3    amLODIPine (NORVASC) 5 MG tablet Take 1 tablet by mouth daily 90 tablet 3    rosuvastatin (CRESTOR) 5 MG tablet Take 1 tablet by mouth daily 90 tablet 3    Naproxen Sodium (ALEVE PO) Take 1 tablet by mouth 2 times daily      aspirin 81 MG EC tablet Take 81 mg by mouth       No current facility-administered medications for this visit. Allergies   Allergen Reactions    Azithromycin Rash       Social History     Social History    Marital status:      Spouse name: N/A    Number of children: N/A    Years of education: N/A     Occupational History    Not on file.      Social History Main Topics    Smoking status: Never Smoker    Smokeless tobacco: Never Used    Alcohol use Not on file    Drug use: Unknown    Sexual activity: Not on file     Other Topics Concern   

## 2018-07-18 ENCOUNTER — TELEPHONE (OUTPATIENT)
Dept: FAMILY MEDICINE CLINIC | Age: 68
End: 2018-07-18

## 2018-07-26 ENCOUNTER — HOSPITAL ENCOUNTER (OUTPATIENT)
Dept: PREADMISSION TESTING | Age: 68
Discharge: HOME | End: 2018-07-30
Payer: MEDICARE

## 2018-07-26 VITALS
SYSTOLIC BLOOD PRESSURE: 167 MMHG | WEIGHT: 209.6 LBS | TEMPERATURE: 98.3 F | HEART RATE: 81 BPM | HEIGHT: 72 IN | BODY MASS INDEX: 28.39 KG/M2 | RESPIRATION RATE: 16 BRPM | DIASTOLIC BLOOD PRESSURE: 90 MMHG | OXYGEN SATURATION: 97 %

## 2018-07-26 DIAGNOSIS — M16.11 PRIMARY OSTEOARTHRITIS OF RIGHT HIP: ICD-10-CM

## 2018-07-26 DIAGNOSIS — Z96.641 STATUS POST TOTAL HIP REPLACEMENT, RIGHT: Primary | ICD-10-CM

## 2018-07-26 LAB
ABO/RH: NORMAL
ANION GAP SERPL CALCULATED.3IONS-SCNC: 14 MEQ/L (ref 7–13)
ANTIBODY SCREEN: NORMAL
BILIRUBIN URINE: NEGATIVE
BLOOD, URINE: NEGATIVE
BUN BLDV-MCNC: 13 MG/DL (ref 8–23)
CALCIUM SERPL-MCNC: 9.4 MG/DL (ref 8.6–10.2)
CHLORIDE BLD-SCNC: 93 MEQ/L (ref 98–107)
CLARITY: CLEAR
CO2: 23 MEQ/L (ref 22–29)
COLOR: YELLOW
CREAT SERPL-MCNC: 0.55 MG/DL (ref 0.7–1.2)
EKG ATRIAL RATE: 76 BPM
EKG P AXIS: 64 DEGREES
EKG P-R INTERVAL: 152 MS
EKG Q-T INTERVAL: 412 MS
EKG QRS DURATION: 92 MS
EKG QTC CALCULATION (BAZETT): 463 MS
EKG R AXIS: -22 DEGREES
EKG T AXIS: 53 DEGREES
EKG VENTRICULAR RATE: 76 BPM
GFR AFRICAN AMERICAN: >60
GFR NON-AFRICAN AMERICAN: >60
GLUCOSE BLD-MCNC: 102 MG/DL (ref 74–109)
GLUCOSE URINE: NEGATIVE MG/DL
HCT VFR BLD CALC: 41.9 % (ref 42–52)
HEMOGLOBIN: 14.2 G/DL (ref 14–18)
INR BLD: 1
KETONES, URINE: NEGATIVE MG/DL
LEUKOCYTE ESTERASE, URINE: NEGATIVE
MCH RBC QN AUTO: 30.9 PG (ref 27–31.3)
MCHC RBC AUTO-ENTMCNC: 33.8 % (ref 33–37)
MCV RBC AUTO: 91.4 FL (ref 80–100)
NITRITE, URINE: NEGATIVE
PDW BLD-RTO: 13.4 % (ref 11.5–14.5)
PH UA: 7 (ref 5–9)
PLATELET # BLD: 161 K/UL (ref 130–400)
POTASSIUM SERPL-SCNC: 4.3 MEQ/L (ref 3.5–5.1)
PROTEIN UA: NEGATIVE MG/DL
PROTHROMBIN TIME: 10.8 SEC (ref 9.6–12.3)
RBC # BLD: 4.58 M/UL (ref 4.7–6.1)
SODIUM BLD-SCNC: 130 MEQ/L (ref 132–144)
SPECIFIC GRAVITY UA: 1.01 (ref 1–1.03)
URINE REFLEX TO CULTURE: NORMAL
UROBILINOGEN, URINE: 0.2 E.U./DL
WBC # BLD: 4.9 K/UL (ref 4.8–10.8)

## 2018-07-26 PROCEDURE — 86901 BLOOD TYPING SEROLOGIC RH(D): CPT

## 2018-07-26 PROCEDURE — 86900 BLOOD TYPING SEROLOGIC ABO: CPT

## 2018-07-26 PROCEDURE — 86850 RBC ANTIBODY SCREEN: CPT

## 2018-07-26 PROCEDURE — 80048 BASIC METABOLIC PNL TOTAL CA: CPT

## 2018-07-26 PROCEDURE — 85610 PROTHROMBIN TIME: CPT

## 2018-07-26 PROCEDURE — 85027 COMPLETE CBC AUTOMATED: CPT

## 2018-07-26 PROCEDURE — 81003 URINALYSIS AUTO W/O SCOPE: CPT

## 2018-07-26 PROCEDURE — 93005 ELECTROCARDIOGRAM TRACING: CPT

## 2018-07-26 RX ORDER — SODIUM CHLORIDE 0.9 % (FLUSH) 0.9 %
10 SYRINGE (ML) INJECTION PRN
Status: CANCELLED | OUTPATIENT
Start: 2018-07-26

## 2018-07-26 RX ORDER — LIDOCAINE HYDROCHLORIDE 10 MG/ML
1 INJECTION, SOLUTION EPIDURAL; INFILTRATION; INTRACAUDAL; PERINEURAL
Status: CANCELLED | OUTPATIENT
Start: 2018-07-26 | End: 2018-07-26

## 2018-07-26 RX ORDER — SODIUM CHLORIDE 0.9 % (FLUSH) 0.9 %
10 SYRINGE (ML) INJECTION EVERY 12 HOURS SCHEDULED
Status: CANCELLED | OUTPATIENT
Start: 2018-07-26

## 2018-07-26 RX ORDER — SODIUM CHLORIDE, SODIUM LACTATE, POTASSIUM CHLORIDE, CALCIUM CHLORIDE 600; 310; 30; 20 MG/100ML; MG/100ML; MG/100ML; MG/100ML
INJECTION, SOLUTION INTRAVENOUS CONTINUOUS
Status: CANCELLED | OUTPATIENT
Start: 2018-07-26

## 2018-07-27 PROCEDURE — 93010 ELECTROCARDIOGRAM REPORT: CPT | Performed by: INTERNAL MEDICINE

## 2018-08-03 ENCOUNTER — APPOINTMENT (OUTPATIENT)
Dept: GENERAL RADIOLOGY | Age: 68
DRG: 470 | End: 2018-08-03
Attending: ORTHOPAEDIC SURGERY
Payer: MEDICARE

## 2018-08-03 ENCOUNTER — HOSPITAL ENCOUNTER (INPATIENT)
Age: 68
LOS: 1 days | Discharge: HOME HEALTH | DRG: 470 | End: 2018-08-04
Attending: ORTHOPAEDIC SURGERY | Admitting: ORTHOPAEDIC SURGERY
Payer: MEDICARE

## 2018-08-03 ENCOUNTER — ANESTHESIA (OUTPATIENT)
Dept: OPERATING ROOM | Age: 68
DRG: 470 | End: 2018-08-03
Payer: MEDICARE

## 2018-08-03 ENCOUNTER — ANESTHESIA EVENT (OUTPATIENT)
Dept: OPERATING ROOM | Age: 68
DRG: 470 | End: 2018-08-03
Payer: MEDICARE

## 2018-08-03 VITALS — TEMPERATURE: 98.6 F | SYSTOLIC BLOOD PRESSURE: 87 MMHG | DIASTOLIC BLOOD PRESSURE: 50 MMHG | OXYGEN SATURATION: 98 %

## 2018-08-03 DIAGNOSIS — Z96.641 STATUS POST HIP REPLACEMENT, RIGHT: Primary | ICD-10-CM

## 2018-08-03 PROCEDURE — 97535 SELF CARE MNGMENT TRAINING: CPT

## 2018-08-03 PROCEDURE — G8978 MOBILITY CURRENT STATUS: HCPCS

## 2018-08-03 PROCEDURE — 6360000002 HC RX W HCPCS: Performed by: NURSE PRACTITIONER

## 2018-08-03 PROCEDURE — 7100000001 HC PACU RECOVERY - ADDTL 15 MIN: Performed by: ORTHOPAEDIC SURGERY

## 2018-08-03 PROCEDURE — 6360000002 HC RX W HCPCS: Performed by: ORTHOPAEDIC SURGERY

## 2018-08-03 PROCEDURE — 6360000002 HC RX W HCPCS: Performed by: NURSE ANESTHETIST, CERTIFIED REGISTERED

## 2018-08-03 PROCEDURE — 2580000003 HC RX 258: Performed by: ORTHOPAEDIC SURGERY

## 2018-08-03 PROCEDURE — 0SR902A REPLACEMENT OF RIGHT HIP JOINT WITH METAL ON POLYETHYLENE SYNTHETIC SUBSTITUTE, UNCEMENTED, OPEN APPROACH: ICD-10-PCS | Performed by: ORTHOPAEDIC SURGERY

## 2018-08-03 PROCEDURE — 2580000003 HC RX 258: Performed by: NURSE ANESTHETIST, CERTIFIED REGISTERED

## 2018-08-03 PROCEDURE — 3700000001 HC ADD 15 MINUTES (ANESTHESIA): Performed by: ORTHOPAEDIC SURGERY

## 2018-08-03 PROCEDURE — C1713 ANCHOR/SCREW BN/BN,TIS/BN: HCPCS | Performed by: ORTHOPAEDIC SURGERY

## 2018-08-03 PROCEDURE — 6370000000 HC RX 637 (ALT 250 FOR IP): Performed by: NURSE PRACTITIONER

## 2018-08-03 PROCEDURE — 3600000014 HC SURGERY LEVEL 4 ADDTL 15MIN: Performed by: ORTHOPAEDIC SURGERY

## 2018-08-03 PROCEDURE — 88305 TISSUE EXAM BY PATHOLOGIST: CPT

## 2018-08-03 PROCEDURE — 3600000004 HC SURGERY LEVEL 4 BASE: Performed by: ORTHOPAEDIC SURGERY

## 2018-08-03 PROCEDURE — G8987 SELF CARE CURRENT STATUS: HCPCS

## 2018-08-03 PROCEDURE — 6370000000 HC RX 637 (ALT 250 FOR IP): Performed by: INTERNAL MEDICINE

## 2018-08-03 PROCEDURE — 2500000003 HC RX 250 WO HCPCS: Performed by: NURSE ANESTHETIST, CERTIFIED REGISTERED

## 2018-08-03 PROCEDURE — 2500000003 HC RX 250 WO HCPCS: Performed by: NURSE PRACTITIONER

## 2018-08-03 PROCEDURE — G8979 MOBILITY GOAL STATUS: HCPCS

## 2018-08-03 PROCEDURE — 97162 PT EVAL MOD COMPLEX 30 MIN: CPT

## 2018-08-03 PROCEDURE — 88311 DECALCIFY TISSUE: CPT

## 2018-08-03 PROCEDURE — 2709999900 HC NON-CHARGEABLE SUPPLY: Performed by: ORTHOPAEDIC SURGERY

## 2018-08-03 PROCEDURE — 86923 COMPATIBILITY TEST ELECTRIC: CPT

## 2018-08-03 PROCEDURE — 97167 OT EVAL HIGH COMPLEX 60 MIN: CPT

## 2018-08-03 PROCEDURE — 2720000010 HC SURG SUPPLY STERILE: Performed by: ORTHOPAEDIC SURGERY

## 2018-08-03 PROCEDURE — 73501 X-RAY EXAM HIP UNI 1 VIEW: CPT

## 2018-08-03 PROCEDURE — 7100000000 HC PACU RECOVERY - FIRST 15 MIN: Performed by: ORTHOPAEDIC SURGERY

## 2018-08-03 PROCEDURE — 2580000003 HC RX 258: Performed by: NURSE PRACTITIONER

## 2018-08-03 PROCEDURE — 1210000000 HC MED SURG R&B

## 2018-08-03 PROCEDURE — C1776 JOINT DEVICE (IMPLANTABLE): HCPCS | Performed by: ORTHOPAEDIC SURGERY

## 2018-08-03 PROCEDURE — 3700000000 HC ANESTHESIA ATTENDED CARE: Performed by: ORTHOPAEDIC SURGERY

## 2018-08-03 PROCEDURE — G8988 SELF CARE GOAL STATUS: HCPCS

## 2018-08-03 PROCEDURE — 2500000003 HC RX 250 WO HCPCS: Performed by: ORTHOPAEDIC SURGERY

## 2018-08-03 PROCEDURE — 6370000000 HC RX 637 (ALT 250 FOR IP): Performed by: ORTHOPAEDIC SURGERY

## 2018-08-03 DEVICE — HEAD FEMORAL COCR 12/14 36MM +0: Type: IMPLANTABLE DEVICE | Site: HIP | Status: FUNCTIONAL

## 2018-08-03 DEVICE — STEM FEM L138MM DIA13MM NEUT STR 12/14 TAPR STD NK EXT: Type: IMPLANTABLE DEVICE | Site: HIP | Status: FUNCTIONAL

## 2018-08-03 DEVICE — BONE SCREW 6.5X40 SELF-TAP: Type: IMPLANTABLE DEVICE | Site: HIP | Status: FUNCTIONAL

## 2018-08-03 DEVICE — IMPLANTABLE DEVICE: Type: IMPLANTABLE DEVICE | Site: HIP | Status: FUNCTIONAL

## 2018-08-03 DEVICE — KIT THR TRABECULAR MTL STEM CUP XLPE LNR: Type: IMPLANTABLE DEVICE | Site: HIP | Status: FUNCTIONAL

## 2018-08-03 RX ORDER — MAGNESIUM HYDROXIDE 1200 MG/15ML
LIQUID ORAL CONTINUOUS PRN
Status: COMPLETED | OUTPATIENT
Start: 2018-08-03 | End: 2018-08-03

## 2018-08-03 RX ORDER — ACETAMINOPHEN 325 MG/1
650 TABLET ORAL EVERY 6 HOURS
Status: DISCONTINUED | OUTPATIENT
Start: 2018-08-03 | End: 2018-08-04 | Stop reason: HOSPADM

## 2018-08-03 RX ORDER — MORPHINE SULFATE 2 MG/ML
2 INJECTION, SOLUTION INTRAMUSCULAR; INTRAVENOUS
Status: ACTIVE | OUTPATIENT
Start: 2018-08-03 | End: 2018-08-04

## 2018-08-03 RX ORDER — DIPHENHYDRAMINE HYDROCHLORIDE 50 MG/ML
12.5 INJECTION INTRAMUSCULAR; INTRAVENOUS
Status: DISCONTINUED | OUTPATIENT
Start: 2018-08-03 | End: 2018-08-03 | Stop reason: HOSPADM

## 2018-08-03 RX ORDER — HYDROCODONE BITARTRATE AND ACETAMINOPHEN 5; 325 MG/1; MG/1
2 TABLET ORAL PRN
Status: DISCONTINUED | OUTPATIENT
Start: 2018-08-03 | End: 2018-08-03 | Stop reason: HOSPADM

## 2018-08-03 RX ORDER — SODIUM CHLORIDE 0.9 % (FLUSH) 0.9 %
10 SYRINGE (ML) INJECTION PRN
Status: DISCONTINUED | OUTPATIENT
Start: 2018-08-03 | End: 2018-08-03 | Stop reason: HOSPADM

## 2018-08-03 RX ORDER — KETOROLAC TROMETHAMINE 30 MG/ML
INJECTION, SOLUTION INTRAMUSCULAR; INTRAVENOUS PRN
Status: DISCONTINUED | OUTPATIENT
Start: 2018-08-03 | End: 2018-08-03 | Stop reason: SDUPTHER

## 2018-08-03 RX ORDER — ASPIRIN 81 MG/1
81 TABLET ORAL 2 TIMES DAILY
Status: DISCONTINUED | OUTPATIENT
Start: 2018-08-04 | End: 2018-08-04 | Stop reason: HOSPADM

## 2018-08-03 RX ORDER — SENNA AND DOCUSATE SODIUM 50; 8.6 MG/1; MG/1
1 TABLET, FILM COATED ORAL DAILY
Status: DISCONTINUED | OUTPATIENT
Start: 2018-08-03 | End: 2018-08-04 | Stop reason: HOSPADM

## 2018-08-03 RX ORDER — LIDOCAINE HYDROCHLORIDE 10 MG/ML
1 INJECTION, SOLUTION EPIDURAL; INFILTRATION; INTRACAUDAL; PERINEURAL
Status: COMPLETED | OUTPATIENT
Start: 2018-08-03 | End: 2018-08-03

## 2018-08-03 RX ORDER — ONDANSETRON 2 MG/ML
4 INJECTION INTRAMUSCULAR; INTRAVENOUS EVERY 6 HOURS PRN
Status: DISCONTINUED | OUTPATIENT
Start: 2018-08-03 | End: 2018-08-04 | Stop reason: HOSPADM

## 2018-08-03 RX ORDER — SODIUM CHLORIDE 0.9 % (FLUSH) 0.9 %
10 SYRINGE (ML) INJECTION EVERY 12 HOURS SCHEDULED
Status: DISCONTINUED | OUTPATIENT
Start: 2018-08-03 | End: 2018-08-03 | Stop reason: HOSPADM

## 2018-08-03 RX ORDER — ACETAMINOPHEN 500 MG
1000 TABLET ORAL ONCE
Status: COMPLETED | OUTPATIENT
Start: 2018-08-03 | End: 2018-08-03

## 2018-08-03 RX ORDER — HYDROCODONE BITARTRATE AND ACETAMINOPHEN 5; 325 MG/1; MG/1
1 TABLET ORAL PRN
Status: DISCONTINUED | OUTPATIENT
Start: 2018-08-03 | End: 2018-08-03 | Stop reason: HOSPADM

## 2018-08-03 RX ORDER — ONDANSETRON 2 MG/ML
4 INJECTION INTRAMUSCULAR; INTRAVENOUS
Status: DISCONTINUED | OUTPATIENT
Start: 2018-08-03 | End: 2018-08-03 | Stop reason: HOSPADM

## 2018-08-03 RX ORDER — OXYCODONE HCL 10 MG/1
10 TABLET, FILM COATED, EXTENDED RELEASE ORAL ONCE
Status: COMPLETED | OUTPATIENT
Start: 2018-08-03 | End: 2018-08-03

## 2018-08-03 RX ORDER — MORPHINE SULFATE 4 MG/ML
4 INJECTION, SOLUTION INTRAMUSCULAR; INTRAVENOUS
Status: ACTIVE | OUTPATIENT
Start: 2018-08-03 | End: 2018-08-04

## 2018-08-03 RX ORDER — MEPERIDINE HYDROCHLORIDE 25 MG/ML
12.5 INJECTION INTRAMUSCULAR; INTRAVENOUS; SUBCUTANEOUS EVERY 5 MIN PRN
Status: DISCONTINUED | OUTPATIENT
Start: 2018-08-03 | End: 2018-08-03 | Stop reason: HOSPADM

## 2018-08-03 RX ORDER — ASPIRIN 81 MG/1
81 TABLET ORAL 2 TIMES DAILY
Qty: 60 TABLET | Refills: 0 | Status: ON HOLD | OUTPATIENT
Start: 2018-08-03 | End: 2018-11-16

## 2018-08-03 RX ORDER — ONDANSETRON 2 MG/ML
INJECTION INTRAMUSCULAR; INTRAVENOUS PRN
Status: DISCONTINUED | OUTPATIENT
Start: 2018-08-03 | End: 2018-08-03 | Stop reason: SDUPTHER

## 2018-08-03 RX ORDER — METOCLOPRAMIDE HYDROCHLORIDE 5 MG/ML
10 INJECTION INTRAMUSCULAR; INTRAVENOUS
Status: DISCONTINUED | OUTPATIENT
Start: 2018-08-03 | End: 2018-08-03 | Stop reason: HOSPADM

## 2018-08-03 RX ORDER — LISINOPRIL 10 MG/1
10 TABLET ORAL DAILY
Status: DISCONTINUED | OUTPATIENT
Start: 2018-08-03 | End: 2018-08-04 | Stop reason: HOSPADM

## 2018-08-03 RX ORDER — LIDOCAINE HYDROCHLORIDE 10 MG/ML
1 INJECTION, SOLUTION EPIDURAL; INFILTRATION; INTRACAUDAL; PERINEURAL
Status: DISCONTINUED | OUTPATIENT
Start: 2018-08-03 | End: 2018-08-03 | Stop reason: HOSPADM

## 2018-08-03 RX ORDER — OXYCODONE HYDROCHLORIDE 5 MG/1
5 TABLET ORAL EVERY 4 HOURS PRN
Status: DISCONTINUED | OUTPATIENT
Start: 2018-08-03 | End: 2018-08-04 | Stop reason: HOSPADM

## 2018-08-03 RX ORDER — SODIUM CHLORIDE, SODIUM LACTATE, POTASSIUM CHLORIDE, CALCIUM CHLORIDE 600; 310; 30; 20 MG/100ML; MG/100ML; MG/100ML; MG/100ML
INJECTION, SOLUTION INTRAVENOUS CONTINUOUS
Status: DISCONTINUED | OUTPATIENT
Start: 2018-08-03 | End: 2018-08-03

## 2018-08-03 RX ORDER — AMLODIPINE BESYLATE 5 MG/1
5 TABLET ORAL DAILY
Status: DISCONTINUED | OUTPATIENT
Start: 2018-08-03 | End: 2018-08-04 | Stop reason: HOSPADM

## 2018-08-03 RX ORDER — ATORVASTATIN CALCIUM 20 MG/1
20 TABLET, FILM COATED ORAL NIGHTLY
Status: DISCONTINUED | OUTPATIENT
Start: 2018-08-03 | End: 2018-08-04 | Stop reason: HOSPADM

## 2018-08-03 RX ORDER — LIDOCAINE HYDROCHLORIDE 10 MG/ML
INJECTION, SOLUTION INFILTRATION; PERINEURAL PRN
Status: DISCONTINUED | OUTPATIENT
Start: 2018-08-03 | End: 2018-08-03 | Stop reason: SDUPTHER

## 2018-08-03 RX ORDER — SODIUM CHLORIDE, SODIUM LACTATE, POTASSIUM CHLORIDE, CALCIUM CHLORIDE 600; 310; 30; 20 MG/100ML; MG/100ML; MG/100ML; MG/100ML
INJECTION, SOLUTION INTRAVENOUS CONTINUOUS
Status: DISCONTINUED | OUTPATIENT
Start: 2018-08-03 | End: 2018-08-03 | Stop reason: SDUPTHER

## 2018-08-03 RX ORDER — CELECOXIB 200 MG/1
400 CAPSULE ORAL ONCE
Status: COMPLETED | OUTPATIENT
Start: 2018-08-03 | End: 2018-08-03

## 2018-08-03 RX ORDER — FENTANYL CITRATE 50 UG/ML
50 INJECTION, SOLUTION INTRAMUSCULAR; INTRAVENOUS EVERY 10 MIN PRN
Status: DISCONTINUED | OUTPATIENT
Start: 2018-08-03 | End: 2018-08-03 | Stop reason: HOSPADM

## 2018-08-03 RX ORDER — DOCUSATE SODIUM 100 MG/1
100 CAPSULE, LIQUID FILLED ORAL 2 TIMES DAILY
Status: DISCONTINUED | OUTPATIENT
Start: 2018-08-03 | End: 2018-08-04 | Stop reason: HOSPADM

## 2018-08-03 RX ORDER — OXYCODONE HYDROCHLORIDE 5 MG/1
5 TABLET ORAL EVERY 4 HOURS PRN
Qty: 40 TABLET | Refills: 0 | Status: SHIPPED | OUTPATIENT
Start: 2018-08-03 | End: 2018-08-10

## 2018-08-03 RX ORDER — CITALOPRAM 10 MG/1
10 TABLET ORAL DAILY
Status: DISCONTINUED | OUTPATIENT
Start: 2018-08-03 | End: 2018-08-04 | Stop reason: HOSPADM

## 2018-08-03 RX ORDER — MIDAZOLAM HYDROCHLORIDE 1 MG/ML
INJECTION INTRAMUSCULAR; INTRAVENOUS PRN
Status: DISCONTINUED | OUTPATIENT
Start: 2018-08-03 | End: 2018-08-03 | Stop reason: SDUPTHER

## 2018-08-03 RX ORDER — SODIUM CHLORIDE, SODIUM LACTATE, POTASSIUM CHLORIDE, CALCIUM CHLORIDE 600; 310; 30; 20 MG/100ML; MG/100ML; MG/100ML; MG/100ML
INJECTION, SOLUTION INTRAVENOUS CONTINUOUS PRN
Status: DISCONTINUED | OUTPATIENT
Start: 2018-08-03 | End: 2018-08-03 | Stop reason: SDUPTHER

## 2018-08-03 RX ORDER — SODIUM CHLORIDE 0.9 % (FLUSH) 0.9 %
10 SYRINGE (ML) INJECTION EVERY 12 HOURS SCHEDULED
Status: DISCONTINUED | OUTPATIENT
Start: 2018-08-03 | End: 2018-08-04 | Stop reason: HOSPADM

## 2018-08-03 RX ORDER — SODIUM CHLORIDE 0.9 % (FLUSH) 0.9 %
10 SYRINGE (ML) INJECTION PRN
Status: DISCONTINUED | OUTPATIENT
Start: 2018-08-03 | End: 2018-08-04 | Stop reason: HOSPADM

## 2018-08-03 RX ORDER — METOPROLOL SUCCINATE 50 MG/1
50 TABLET, EXTENDED RELEASE ORAL DAILY
Status: DISCONTINUED | OUTPATIENT
Start: 2018-08-04 | End: 2018-08-04 | Stop reason: HOSPADM

## 2018-08-03 RX ORDER — KETOROLAC TROMETHAMINE 30 MG/ML
30 INJECTION, SOLUTION INTRAMUSCULAR; INTRAVENOUS EVERY 6 HOURS
Status: COMPLETED | OUTPATIENT
Start: 2018-08-03 | End: 2018-08-03

## 2018-08-03 RX ORDER — PROPOFOL 10 MG/ML
INJECTION, EMULSION INTRAVENOUS CONTINUOUS PRN
Status: DISCONTINUED | OUTPATIENT
Start: 2018-08-03 | End: 2018-08-03 | Stop reason: SDUPTHER

## 2018-08-03 RX ORDER — SODIUM CHLORIDE 9 MG/ML
INJECTION, SOLUTION INTRAVENOUS CONTINUOUS
Status: DISCONTINUED | OUTPATIENT
Start: 2018-08-03 | End: 2018-08-04 | Stop reason: HOSPADM

## 2018-08-03 RX ADMIN — CEFAZOLIN SODIUM 2 G: 2 SOLUTION INTRAVENOUS at 17:31

## 2018-08-03 RX ADMIN — SODIUM CHLORIDE, POTASSIUM CHLORIDE, SODIUM LACTATE AND CALCIUM CHLORIDE: 600; 310; 30; 20 INJECTION, SOLUTION INTRAVENOUS at 07:47

## 2018-08-03 RX ADMIN — ONDANSETRON HYDROCHLORIDE 4 MG: 2 INJECTION, SOLUTION INTRAMUSCULAR; INTRAVENOUS at 10:10

## 2018-08-03 RX ADMIN — SODIUM CHLORIDE: 9 INJECTION, SOLUTION INTRAVENOUS at 12:57

## 2018-08-03 RX ADMIN — PROPOFOL 75 MCG/KG/MIN: 10 INJECTION, EMULSION INTRAVENOUS at 09:00

## 2018-08-03 RX ADMIN — OXYCODONE HYDROCHLORIDE 5 MG: 5 TABLET ORAL at 17:31

## 2018-08-03 RX ADMIN — CEFAZOLIN SODIUM 2 G: 2 SOLUTION INTRAVENOUS at 08:53

## 2018-08-03 RX ADMIN — CITALOPRAM HYDROBROMIDE 10 MG: 10 TABLET ORAL at 14:14

## 2018-08-03 RX ADMIN — LISINOPRIL 10 MG: 10 TABLET ORAL at 14:15

## 2018-08-03 RX ADMIN — ACETAMINOPHEN 1000 MG: 500 TABLET ORAL at 07:27

## 2018-08-03 RX ADMIN — LIDOCAINE HYDROCHLORIDE 1 ML: 10 INJECTION, SOLUTION EPIDURAL; INFILTRATION; INTRACAUDAL; PERINEURAL at 07:52

## 2018-08-03 RX ADMIN — Medication 0.1 MG: at 09:55

## 2018-08-03 RX ADMIN — SENNOSIDES AND DOCUSATE SODIUM 1 TABLET: 8.6; 5 TABLET ORAL at 14:15

## 2018-08-03 RX ADMIN — OXYCODONE HYDROCHLORIDE 10 MG: 10 TABLET, FILM COATED, EXTENDED RELEASE ORAL at 07:28

## 2018-08-03 RX ADMIN — MIDAZOLAM HYDROCHLORIDE 2 MG: 1 INJECTION, SOLUTION INTRAMUSCULAR; INTRAVENOUS at 08:53

## 2018-08-03 RX ADMIN — Medication 0.1 MG: at 10:06

## 2018-08-03 RX ADMIN — Medication 0.1 MG: at 09:18

## 2018-08-03 RX ADMIN — SODIUM CHLORIDE, POTASSIUM CHLORIDE, SODIUM LACTATE AND CALCIUM CHLORIDE: 600; 310; 30; 20 INJECTION, SOLUTION INTRAVENOUS at 09:40

## 2018-08-03 RX ADMIN — AMLODIPINE BESYLATE 5 MG: 5 TABLET ORAL at 14:15

## 2018-08-03 RX ADMIN — DOCUSATE SODIUM 100 MG: 100 CAPSULE, LIQUID FILLED ORAL at 21:35

## 2018-08-03 RX ADMIN — LIDOCAINE HYDROCHLORIDE 50 MG: 10 INJECTION, SOLUTION INFILTRATION; PERINEURAL at 09:00

## 2018-08-03 RX ADMIN — KETOROLAC TROMETHAMINE 30 MG: 30 INJECTION, SOLUTION INTRAMUSCULAR at 21:33

## 2018-08-03 RX ADMIN — ACETAMINOPHEN 650 MG: 325 TABLET ORAL at 18:33

## 2018-08-03 RX ADMIN — Medication 10 ML: at 21:35

## 2018-08-03 RX ADMIN — DOCUSATE SODIUM 100 MG: 100 CAPSULE, LIQUID FILLED ORAL at 14:15

## 2018-08-03 RX ADMIN — KETOROLAC TROMETHAMINE 30 MG: 30 INJECTION, SOLUTION INTRAMUSCULAR at 10:10

## 2018-08-03 RX ADMIN — Medication 0.1 MG: at 09:07

## 2018-08-03 RX ADMIN — SODIUM CHLORIDE, POTASSIUM CHLORIDE, SODIUM LACTATE AND CALCIUM CHLORIDE: 600; 310; 30; 20 INJECTION, SOLUTION INTRAVENOUS at 07:51

## 2018-08-03 RX ADMIN — CELECOXIB 400 MG: 200 CAPSULE ORAL at 07:28

## 2018-08-03 RX ADMIN — ATORVASTATIN CALCIUM 20 MG: 20 TABLET, FILM COATED ORAL at 21:35

## 2018-08-03 RX ADMIN — ACETAMINOPHEN 650 MG: 325 TABLET ORAL at 12:58

## 2018-08-03 RX ADMIN — KETOROLAC TROMETHAMINE 30 MG: 30 INJECTION, SOLUTION INTRAMUSCULAR at 12:58

## 2018-08-03 RX ADMIN — OXYCODONE HYDROCHLORIDE 5 MG: 5 TABLET ORAL at 21:35

## 2018-08-03 RX ADMIN — ACETAMINOPHEN 650 MG: 325 TABLET ORAL at 23:37

## 2018-08-03 ASSESSMENT — PULMONARY FUNCTION TESTS
PIF_VALUE: 0
PIF_VALUE: 1
PIF_VALUE: 0

## 2018-08-03 ASSESSMENT — PAIN SCALES - GENERAL
PAINLEVEL_OUTOF10: 3
PAINLEVEL_OUTOF10: 4
PAINLEVEL_OUTOF10: 7
PAINLEVEL_OUTOF10: 0
PAINLEVEL_OUTOF10: 1
PAINLEVEL_OUTOF10: 3

## 2018-08-03 ASSESSMENT — PAIN DESCRIPTION - PAIN TYPE: TYPE: SURGICAL PAIN

## 2018-08-03 ASSESSMENT — PAIN DESCRIPTION - LOCATION: LOCATION: HIP

## 2018-08-03 ASSESSMENT — PAIN DESCRIPTION - DESCRIPTORS: DESCRIPTORS: ACHING;BURNING

## 2018-08-03 ASSESSMENT — PAIN - FUNCTIONAL ASSESSMENT: PAIN_FUNCTIONAL_ASSESSMENT: 0-10

## 2018-08-03 ASSESSMENT — PAIN DESCRIPTION - ORIENTATION: ORIENTATION: RIGHT

## 2018-08-03 NOTE — PROGRESS NOTES
fasteners/self-care containers in a timely manner   [x]  Improve B UE Function (AROM, strength, motor control, tone normalization) to complete ADLs as projected. [x]  Improve B UE strength and endurance to [facility] in order to participate in self-care activities as projected. [x]  Access appropriate D/C site with as few architectural barriers as possible. Treatment Plan will consist of:   [x] ADL Training   [x] Strengthening   [x] Endurance   [x] Transfer Training   [x]  DME ed      [x] HEP  [] Manual Therapy   [] AROM/PROM    [] Coordination   [] Cognitive Training   [x]Safety training   [] Other :    Patient Goal: D/C home  Discussed and agreed upon: [x] Yes   [] No Comment:     Assessment/Discharge Disposition:     Performance deficits / Impairments: Decreased functional mobility , Decreased ROM, Decreased ADL status, Decreased safe awareness, Decreased strength, Decreased endurance, Decreased balance, Decreased high-level IADLs  Prognosis: Good  Discharge Recommendations: Continue to assess pending progress  History: multi comorb  Exam: 8 deficits  Assistance / Modification: max A      Barriers to Improvement:  none      Discharge Recommendations:  Therapy ([facility])    Six Click Score  How much help for putting on and taking off regular lower body clothing?: Total  How much help for Bathing?: A Lot  How much help for Toileting?: A Little  How much help for putting on and taking off regular upper body clothing?: A Little  How much help for taking care of personal grooming?: A Little  How much help for eating meals?: None  AM-PAC Inpatient Daily Activity Raw Score: 16  AM-PAC Inpatient ADL T-Scale Score : 35.96  ADL Inpatient CMS 0-100% Score: 53.32    Recommended DME:  [x] W/W   [] Ben Winter   [] Rollator   [] W/C   [x] Celia Beaufort  [x] Shower Chair   [x]Dressing AD [x]  BSC  [] Other:    Plan:Times per week: 1-3x,      G-Codes:  OT G-codes  Functional Limitation: Self care  Self Care Current Status ():  At least 60

## 2018-08-03 NOTE — PROGRESS NOTES
Screening  Patient Currently in Pain: Yes  Pain Assessment  Pain Assessment: 0-10  Pain Level: 2 \"feels a little better after moving\"  Pain Location: Hip  Pain Orientation: Right    Prior Level of Function:  Social/Functional History  Home Layout: Multi-level (down 3 steps from level entry to bathroom; 9 steps, landing, 3 more steps to upper level bedroom with 1 handrail)  Home Access: Level entry  Bathroom Shower/Tub: Walk-in shower  Home Equipment: Cane, Rolling walker  ADL Assistance: Independent  Homemaking Assistance: Independent  Ambulation Assistance: Independent (with straight cane for past 2 months)  Transfer Assistance: Independent  Additional Comments: Pt reports he can stay on lower level with pull-out bed with full bathroom upon initial DC home; wife can assist as needed    OBJECTIVE:   Vision/Hearing:  Vision: Within Functional Limits  Hearing: Within functional limits    Cognition:  Overall Orientation Status: Within Functional Limits  Follows Commands: Within Functional Limits         ROM:  RLE AROM: WFL  RLE General AROM: within hip precautions  LLE AROM : WFL    Strength:  Strength RLE  Comment: observed at least 3/5  Strength LLE  Comment: 4/5 gross assessment    Neuro:  Balance  Standing - Static: Fair;-  Standing - Dynamic: Fair;-             Bed mobility  Supine to Sit: Contact guard assistance, initial LOB upon upright sitting that pt is able to self correct    Transfers  Sit to Stand: Minimal Assistance  Stand Pivot Transfers: Minimal Assistance  Comment: min A for lifting assist, able to pivot with CGA once in standing, follows hip precautions well with VCs throughout    Ambulation  Ambulation?: Yes  Ambulation 1  Surface: level tile  Device: Rolling Walker  Assistance: Contact guard assistance  Quality of Gait: antalgic gait, decreased stance time RLE, foot flat and shuffing gait  Distance: 5 ft               ASSESSMENT:   Body structures, Functions, Activity limitations: Decreased Pt to demo SBA with HEP  Short term goal 2: Pt to tolerate >5 min standing with symmetrical WBing  Short term goal 3: Pt to demo >fair balance for standing tasks  Long term goals  Long term goal 1: Pt to demo mod I bed mobility  Long term goal 2: Pt to demo mod I transfers  Long term goal 3: Pt to ambulate >50' with Supervision FWW  Long term goal 4: Pt to navigate >3 steps with 1 HR and cane with Supervision    AMPAC (6 CLICK) BASIC MOBILITY  AM-PAC Inpatient Mobility Raw Score : 17     Therapy Time:   Individual   Time In 0255   Time Out 0319   Minutes 24           Sonia Gordon, PT, 08/03/18 at 3:32 PM

## 2018-08-03 NOTE — ANESTHESIA PRE PROCEDURE
Department of Anesthesiology  Preprocedure Note       Name:  Elijah Sandhoff   Age:  76 y.o.  :  1950                                          MRN:  [id number]         Date:  8/3/2018      Surgeon: Levar Candelario):  Dom Cohen MD    Procedure: Procedure(s):  RIGHT TOTAL HIP ARTHROPLASTY LATERAL DECUB JOY TM CUP/STEM    Medications prior to admission:   Prior to Admission medications    Medication Sig Start Date End Date Taking?  Authorizing Provider   CPAP Machine MISC by Does not apply route   Yes Historical Provider, MD   metoprolol succinate (TOPROL XL) 50 MG extended release tablet Take 1 tablet by mouth daily 18  Yes Fede Lewis MD   lisinopril (PRINIVIL;ZESTRIL) 10 MG tablet Take 1 tablet by mouth daily 18  Yes Fede Lewis MD   citalopram (CELEXA) 10 MG tablet Take 1 tablet by mouth daily 18  Yes Fede Lewis MD   amLODIPine (NORVASC) 5 MG tablet Take 1 tablet by mouth daily 18  Yes Fede Lewis MD   rosuvastatin (CRESTOR) 5 MG tablet Take 1 tablet by mouth daily 18  Yes Fede Lewis MD   Naproxen Sodium (ALEVE PO) Take 1 tablet by mouth 2 times daily    Historical Provider, MD   aspirin 81 MG EC tablet Take 81 mg by mouth 11   Historical Provider, MD       Current medications:    Current Facility-Administered Medications   Medication Dose Route Frequency Provider Last Rate Last Dose    tranexamic acid (CYKLOKAPRON) 1,000 mg in sodium chloride 0.9 % 50 mL IVPB  1,000 mg Intravenous On Call to [address] [facility]  [address], APRN - CNP        tranexamic acid (CYKLOKAPRON) 1,000 mg in sodium chloride 0.9 % 100 mL IVPB  1,000 mg Intravenous On Call to [address] [place]  [address], APRN - CNP        lactated ringers infusion   Intravenous Continuous BILL Ferrer  mL/hr at 18 0751      sodium chloride flush 0.9 % injection 10 mL  10 mL Intravenous 2 times per day BILL Ferrer CNP        sodium chloride flush 0.9 % injection 10 mL  10 mL Intravenous PRN Cornelious Leventhal, APRN - CNP        ceFAZolin (ANCEF) 2 g in dextrose 3 % 50 mL IVPB (duplex)  2 g Intravenous Once Cornelious Leventhal, APRN - CNP        fentaNYL (SUBLIMAZE) injection 50 mcg  50 mcg Intravenous Q10 Min PRN Mattie Tran MD        HYDROmorphone (DILAUDID) injection 0.5 mg  0.5 mg Intravenous Q10 Min PRN Mattie Tran MD        HYDROcodone-acetaminophen [facility]) 5-325 MG per tablet 1 tablet  1 tablet Oral PRN Mattie Tran MD        Or    HYDROcodone-acetaminophen [facility]) 5-325 MG per tablet 2 tablet  2 tablet Oral PRN Mattie Tran MD        diphenhydrAMINE (BENADRYL) injection 12.5 mg  12.5 mg Intravenous Once PRN Mattie Tran MD        ondansetron [facility]) injection 4 mg  4 mg Intravenous Once PRN Mattie Tran MD        metoclopramide [facility]) injection 10 mg  10 mg Intravenous Once PRN Mattie Tran MD        meperidine (DEMEROL) injection 12.5 mg  12.5 mg Intravenous Q5 Min PRN Mattie Tran MD        lactated ringers infusion   Intravenous Continuous Mattie Tran MD        sodium chloride flush 0.9 % injection 10 mL  10 mL Intravenous 2 times per day Mattie Tran MD        sodium chloride flush 0.9 % injection 10 mL  10 mL Intravenous PRN Mattie Tran MD        lidocaine PF 1 % injection 1 mL  1 mL Intradermal Once PRN Mattie Tran MD           Allergies:     Allergies   Allergen Reactions    Seasonal Other (See Comments)     Sinus sx    Azithromycin Rash       Problem List:    Patient Active Problem List   Diagnosis Code    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

## 2018-08-03 NOTE — CONSULTS
8/3/18  2. HTN-controlled-antihypertensive meds  3.  HLD  4. OA    Electronically signed by REUBEN Herzog on 8/3/18 at 1:37 PM

## 2018-08-03 NOTE — ANESTHESIA POSTPROCEDURE EVALUATION
Department of Anesthesiology  Postprocedure Note    Patient: Val Benavidez  MRN: [id number]  YOB: 1950  Date of evaluation: 8/3/2018  Time:  10:42 AM     Procedure Summary     Date:  08/03/18 Room / Location:  [facility]  / [facility]    Anesthesia Start:  3179 Anesthesia Stop:      Procedure:  RIGHT TOTAL HIP ARTHROPLASTY (Right ) Diagnosis:  (RIGHT DJD HIP)    Surgeon:  Savita Velazquez MD Responsible Provider:  Sudhir Hardy MD    Anesthesia Type:  spinal ASA Status:  3          Anesthesia Type: spinal    [place] Phase I:      [place] Phase II:      Last vitals: Reviewed and per EMR flowsheets.        Anesthesia Post Evaluation    Patient location during evaluation: PACU  Patient participation: complete - patient participated  Level of consciousness: awake and alert  Pain score: 0  Airway patency: patent  Nausea & Vomiting: no nausea  Complications: no  Cardiovascular status: hemodynamically stable  Respiratory status: acceptable  Hydration status: stable

## 2018-08-03 NOTE — ANESTHESIA PROCEDURE NOTES
Spinal Block    Patient location during procedure: pre-op  Reason for block: primary anesthetic  Staffing  Anesthesiologist: Lilly Paulino  Performed: anesthesiologist   Preanesthetic Checklist  Completed: patient identified, site marked, surgical consent, pre-op evaluation, timeout performed, IV checked, risks and benefits discussed, monitors and equipment checked, anesthesia consent given, oxygen available and patient being monitored  Spinal Block  Patient position: sitting  Prep: Betadine  Patient monitoring: cardiac monitor, continuous pulse ox and frequent blood pressure checks  Approach: midline  Location: L3/L4  Provider prep: mask and sterile gloves  Local infiltration: lidocaine  Agent: bupivacaine  Dose: 3  Dose: 3  Needle  Needle type:  Joaquin   Needle gauge: 25 G  Assessment  Events: cerebrospinal fluid  Swirl obtained: Yes  CSF: clear  Attempts: 1  Hemodynamics: stable

## 2018-08-03 NOTE — BRIEF OP NOTE
Brief Postoperative Note  ______________________________________________________________    Patient: Boy Thorpe  YOB: 1950  MRN: [id number]  Date of Procedure: 8/3/2018    Pre-Op Diagnosis: RIGHT DJD HIP    Post-Op Diagnosis: Same       Procedure(s):  RIGHT TOTAL HIP ARTHROPLASTY    Anesthesia: Spinal    Surgeon(s):  Mirela Nichols MD    Staff:  Aleah Mcwilliams Person First: Marilyn Woodall Person Second: April Spring Shiela Osuna  Physician Assistant: Darius Monzon PA-C     Estimated Blood Loss: * No values recorded between 8/3/2018  8:52 AM and 8/3/2018 10:11 AM *126 mL    Complications: None    Specimens:   ID Type Source Tests Collected by Time Destination   A : femoral head  Bone Joint, Hip SURGICAL PATHOLOGY Mirela Nichols MD 8/3/2018 8480        Implants:    Implant Name Type Inv.  Item Serial No.  Lot No. LRB No. Used   IMPL HIP CONTINUUM CLUSTER  HL SHLL  58 LL - [id number] Hip IMPL HIP CONTINUUM CLUSTER  HL SHLL  58  [id number] Alejandro Carrion [id number] Right 1   SCREW CORTCL ACET SLFTP 6.5X40MM - [id number] Screw/Plate/Nail/Russ SCREW CORTCL ACET SLFTP 6.5X40MM [id number] [employer] [id number] Right 1   LINER CONT LONGEVITY ELEV 36X58 - [id number] Hip LINER CONT LONGEVITY ELEV 36X58 [id number] Alejandro Carrion [id number] Right 1   IMPL HIP FEM STEM TRABECULAR [id number] - [id number] Hip IMPL HIP FEM STEM TRABECULAR [id number] Alejandro Carrion [id number] Right 1   IMPL HIP FEM HEAD VERSYS 89XD12 0MM - [id number] Hip IMPL HIP FEM HEAD VERSYS 12TO14 0MM [id number] Alejandro Carrion [id number] Right 1         Drains:      Findings: djd rt hip    Mirela Nichols MD  Date: 8/3/2018  Time: 10:11 AM

## 2018-08-04 VITALS
OXYGEN SATURATION: 98 % | DIASTOLIC BLOOD PRESSURE: 55 MMHG | BODY MASS INDEX: 28.04 KG/M2 | TEMPERATURE: 97.5 F | WEIGHT: 207 LBS | SYSTOLIC BLOOD PRESSURE: 111 MMHG | HEART RATE: 74 BPM | HEIGHT: 72 IN | RESPIRATION RATE: 18 BRPM

## 2018-08-04 PROBLEM — M16.11 OSTEOARTHRITIS OF RIGHT HIP: Status: RESOLVED | Noted: 2018-07-26 | Resolved: 2018-08-04

## 2018-08-04 PROCEDURE — 6370000000 HC RX 637 (ALT 250 FOR IP): Performed by: INTERNAL MEDICINE

## 2018-08-04 PROCEDURE — 94761 N-INVAS EAR/PLS OXIMETRY MLT: CPT

## 2018-08-04 PROCEDURE — 97116 GAIT TRAINING THERAPY: CPT

## 2018-08-04 PROCEDURE — 6370000000 HC RX 637 (ALT 250 FOR IP): Performed by: ORTHOPAEDIC SURGERY

## 2018-08-04 PROCEDURE — 97535 SELF CARE MNGMENT TRAINING: CPT

## 2018-08-04 PROCEDURE — 6360000002 HC RX W HCPCS: Performed by: ORTHOPAEDIC SURGERY

## 2018-08-04 RX ADMIN — OXYCODONE HYDROCHLORIDE 5 MG: 5 TABLET ORAL at 12:43

## 2018-08-04 RX ADMIN — METOPROLOL SUCCINATE 50 MG: 50 TABLET, EXTENDED RELEASE ORAL at 07:49

## 2018-08-04 RX ADMIN — ACETAMINOPHEN 650 MG: 325 TABLET ORAL at 12:44

## 2018-08-04 RX ADMIN — SENNOSIDES AND DOCUSATE SODIUM 1 TABLET: 8.6; 5 TABLET ORAL at 07:49

## 2018-08-04 RX ADMIN — ASPIRIN 81 MG: 81 TABLET, COATED ORAL at 07:49

## 2018-08-04 RX ADMIN — CEFAZOLIN SODIUM 2 G: 2 SOLUTION INTRAVENOUS at 00:55

## 2018-08-04 RX ADMIN — ACETAMINOPHEN 650 MG: 325 TABLET ORAL at 06:15

## 2018-08-04 RX ADMIN — DOCUSATE SODIUM 100 MG: 100 CAPSULE, LIQUID FILLED ORAL at 07:48

## 2018-08-04 RX ADMIN — CITALOPRAM HYDROBROMIDE 10 MG: 10 TABLET ORAL at 07:49

## 2018-08-04 RX ADMIN — AMLODIPINE BESYLATE 5 MG: 5 TABLET ORAL at 07:49

## 2018-08-04 ASSESSMENT — PAIN SCALES - GENERAL
PAINLEVEL_OUTOF10: 4
PAINLEVEL_OUTOF10: 2
PAINLEVEL_OUTOF10: 4
PAINLEVEL_OUTOF10: 0

## 2018-08-04 ASSESSMENT — PAIN DESCRIPTION - DESCRIPTORS
DESCRIPTORS: ACHING
DESCRIPTORS: ACHING

## 2018-08-04 ASSESSMENT — PAIN DESCRIPTION - FREQUENCY
FREQUENCY: INTERMITTENT
FREQUENCY: INTERMITTENT

## 2018-08-04 ASSESSMENT — PAIN DESCRIPTION - PAIN TYPE
TYPE: SURGICAL PAIN
TYPE: SURGICAL PAIN

## 2018-08-04 ASSESSMENT — PAIN DESCRIPTION - LOCATION
LOCATION: HIP
LOCATION: HIP

## 2018-08-04 ASSESSMENT — PAIN DESCRIPTION - ORIENTATION
ORIENTATION: RIGHT
ORIENTATION: UPPER;RIGHT

## 2018-08-04 NOTE — OP NOTE
Antibiotics were given. The patient was  placed in the lateral decubitus position and the hip was prepped and draped  in the usual sterile manner. A standard posterolateral approach to the hip  was made, electrocauterization was used for hemostasis. The IT band was  split in line with its fibers. Anterior and posterior retractors were  inserted. The hip was maximally internally rotated and short external  rotators and piriformis tendon were taken down in one layer from the  piriformis fossa using the Bovie electrocautery device. The capsule was teed in line with the piriformis tendon and then the hip  was dislocated. The femoral neck resection was made 1 cm above the lesser  trochanter. There were extensive erosive changes of the femoral head. The  anterior and posterior acetabular retractors were then inserted. The  labrum was sharply excised using the knife. The acetabulum was curetted to  remove any soft tissue remaining and sequential reaming was performed. Once final reaming was complete, this had excellent circumferential fit and  good bleeding bone. A porous acetabular shell was then inserted after  irrigating the acetabulum with a copious amount of pulsating irrigation. The shell was completely seated, it was checked and it was stable. It was  placed in about 45 degrees of abduction and about 10 to 15 degrees of  anteversion. Once this was completed, femoral preparation was begun. A femoral neck retractor was inserted. A Charnley awl was inserted down  the femoral canal and lateralizing reaming was performed. Sequential  broaching was performed. With the final broach in position, it had  excellent fit and fill. It was placed at approximately 10 to 15 degrees of  anteversion. With the broach completely seated, calcar planing was  performed and a trial reduction was performed. The hip came to full  extension and was stable.   The leg lengths were assessed and felt to be  appropriate. The dislocation was performed and trial femoral components were removed. The final femoral head was inserted and completely seated. The femoral  head and liner were applied. The hip was again reduced. Stability was  assessed and found to be satisfactory. The joint capsule was then irrigated with a copious amount of saline  irrigation. The wound was closed using #2 Ethibond sutures through drill  holes in the piriformis fossa for short external rotators and joint  capsule. The IT band was closed using running locked #0 Vicryl suture,  #000 Monocryl was used to close the underlying subcutaneous tissues and  #4-0 Monocryl was used for the skin. A dry, sterile bulky dressing was  applied. The patient tolerated the procedure well and was taken to the  post-anesthesia care unit in satisfactory condition. A postoperative x-ray was reviewed and demonstrated satisfactory alignment  without fracture. The surgical findings and patient condition were discussed with the  patient's family. IMPLANT SIZES:  _____. We reamed to a 56 and placed a 58 Trabecular Metal  porous Continuum cup. We used a 40-mm screw. We broached to a 13 and  placed a porous Trabecular Metal 13 extended offset stem. We used a 36  head with a 0 neck length.         Daylin Lawler MD    D: 08/03/2018 10:25:40       T: 08/03/2018 11:55:21     [email or web address]  Job#: [id number]     Doc#: [id number]  CC: never used

## 2018-08-04 NOTE — CARE COORDINATION
Plan remains home w/ [facility], no additional needs @ discharge.

## 2018-08-04 NOTE — PROGRESS NOTES
Taras to be discharged home with [facility] as ordered. Patient stated he has script filled at home and was given prior to surgery. Patient has Wheeled walker in bed room. Patent has minimal C/O pain or discomfort.

## 2018-08-04 NOTE — PROGRESS NOTES
Occupational Therapy   [facility] OCCUPATIONAL THERAPY MED SURG TREATMENT NOTE     Date: 2018  Patient Name: Danielle Castano        MRN: [id number]  Account: [de-identified]   : 1950  (76 y.o.)  Room: [place] [address]    Chart Review:  Diagnosis:  The encounter diagnosis was Status post hip replacement, right. Restrictions: WBAT, Posterior Hip Precautions, Fall Risk     Subjective:  Patient states: \"I was told I'd be able to do whatever I was doing before I had this done. \"   Pain: 3/10  Location: Right Hip   Pt reports having recently received pain medication. Cold applied at end of session. Objective: Sit <> Stand txfr Supervision. Pt completed functional mobility throughout room environment using a FWW with Supervision. ADL: Shower session completed as follows. Grooming: Set Up/Supervision for safety in standing at sink to perform oral care and hair care. UE Self -Care:    UE Bathing: Set Up   UE Dressing: Set Up   LE Self-Care:    LE Bathing: Set Up/Supervision for safety in standing. LE Dressing: Set Up/Supervision with VCs to maintain hip precautions. Provided reacher and sock aid for dressing. Pt reports having a sock aid at home and was able to demonstrate use with no difficulty. Shower Txfr: Supervision     Treatment consisted of:   [x] ADL Training  [] Strengthening   [] Transfer Training    [] DME Education  [] HEP   [] Manual Therapy   [x] Patient Education  [] Other:      Assessment: Pt tolerated treatment well.      6-Click  How much help for putting on and taking off regular lower body clothing?: A Little  How much help for Bathing?: A Little  How much help for Toileting?: A Little  How much help for putting on and taking off regular upper body clothing?: A Little  How much help for taking care of personal grooming?: A Little  How much help for eating meals?: None  AM-PAC Inpatient Daily Activity Raw Score: 19  AM-PAC Inpatient ADL T-Scale Score : 40.22  ADL Inpatient CMS 0-100% Score: 42.8    Plan:  [x] Continue OT per POC  [] D/C OT  [] Other:     Goals/Plan:   [x] Improve balance  [] Improve Strength   [x] Improve [place] with functional transfers   [x]  Improve [place] with ADLs     Time In: 09:48  Time Out[de-identified] 10:27     Electronically signed by:    Robert Go OT  8/4/2018, 10:32 AM

## 2018-08-06 LAB
BLOOD BANK DISPENSE STATUS: NORMAL
BLOOD BANK DISPENSE STATUS: NORMAL
BLOOD BANK PRODUCT CODE: NORMAL
BLOOD BANK PRODUCT CODE: NORMAL
BPU ID: NORMAL
BPU ID: NORMAL
DESCRIPTION BLOOD BANK: NORMAL
DESCRIPTION BLOOD BANK: NORMAL

## 2018-08-07 ENCOUNTER — TELEPHONE (OUTPATIENT)
Dept: FAMILY MEDICINE CLINIC | Age: 68
End: 2018-08-07

## 2018-08-07 NOTE — TELEPHONE ENCOUNTER
[place] [facility] Transitions Initial Follow Up Call    Outreach made within 2 business days of discharge: Yes    Patient: Carmela Pérez Patient : 1950   MRN: [id number]  Reason for Admission: RT hip replacement   Discharge Date: 18       Spoke with: Patient       Discharge department/facility: [facility] Interactive Patient Contact:  Was patient able to fill all prescriptions: Yes  Was patient instructed to bring all medications to the follow-up visit: Yes  Is patient taking all medications as directed in the discharge summary? Yes  Does patient understand their discharge instructions: Yes  Does patient have questions or concerns that need addressed prior to 7-14 day follow up office visit: no    Scheduled appointment with PCP within 7-14 days    **Pt wants to schedule follow up appt after post op appt with surgeon. **    Follow Up  Future Appointments  Date Time Provider Edd Vaca   10/23/2018 11:00 AM Abby Chavarria MD [address] [place] [address]

## 2018-08-20 ENCOUNTER — OFFICE VISIT (OUTPATIENT)
Dept: FAMILY MEDICINE CLINIC | Age: 68
End: 2018-08-20
Payer: MEDICARE

## 2018-08-20 VITALS
TEMPERATURE: 98.5 F | SYSTOLIC BLOOD PRESSURE: 124 MMHG | DIASTOLIC BLOOD PRESSURE: 80 MMHG | HEART RATE: 91 BPM | HEIGHT: 72 IN | BODY MASS INDEX: 28.58 KG/M2 | OXYGEN SATURATION: 95 % | WEIGHT: 211 LBS

## 2018-08-20 DIAGNOSIS — Z96.641 STATUS POST HIP REPLACEMENT, RIGHT: ICD-10-CM

## 2018-08-20 DIAGNOSIS — G47.33 OSA (OBSTRUCTIVE SLEEP APNEA): ICD-10-CM

## 2018-08-20 DIAGNOSIS — M25.551 HIP PAIN, RIGHT: ICD-10-CM

## 2018-08-20 DIAGNOSIS — E78.2 MIXED HYPERLIPIDEMIA: ICD-10-CM

## 2018-08-20 DIAGNOSIS — M47.812 ARTHROPATHY OF CERVICAL FACET JOINT: ICD-10-CM

## 2018-08-20 DIAGNOSIS — E66.3 PATIENT OVERWEIGHT: ICD-10-CM

## 2018-08-20 DIAGNOSIS — I10 ESSENTIAL HYPERTENSION: Primary | ICD-10-CM

## 2018-08-20 PROCEDURE — 99214 OFFICE O/P EST MOD 30 MIN: CPT | Performed by: FAMILY MEDICINE

## 2018-08-20 PROCEDURE — 1123F ACP DISCUSS/DSCN MKR DOCD: CPT | Performed by: FAMILY MEDICINE

## 2018-08-20 PROCEDURE — 1036F TOBACCO NON-USER: CPT | Performed by: FAMILY MEDICINE

## 2018-08-20 PROCEDURE — G8427 DOCREV CUR MEDS BY ELIG CLIN: HCPCS | Performed by: FAMILY MEDICINE

## 2018-08-20 PROCEDURE — G8417 CALC BMI ABV UP PARAM F/U: HCPCS | Performed by: FAMILY MEDICINE

## 2018-08-20 PROCEDURE — 1101F PT FALLS ASSESS-DOCD LE1/YR: CPT | Performed by: FAMILY MEDICINE

## 2018-08-20 PROCEDURE — 4040F PNEUMOC VAC/ADMIN/RCVD: CPT | Performed by: FAMILY MEDICINE

## 2018-08-20 PROCEDURE — 3017F COLORECTAL CA SCREEN DOC REV: CPT | Performed by: FAMILY MEDICINE

## 2018-08-20 PROCEDURE — 1111F DSCHRG MED/CURRENT MED MERGE: CPT | Performed by: FAMILY MEDICINE

## 2018-08-20 RX ORDER — OXYCODONE HYDROCHLORIDE AND ACETAMINOPHEN 5; 325 MG/1; MG/1
TABLET ORAL
Refills: 0 | COMMUNITY
Start: 2018-08-02 | End: 2018-10-23 | Stop reason: ALTCHOICE

## 2018-08-20 RX ORDER — METOPROLOL SUCCINATE 50 MG/1
50 TABLET, EXTENDED RELEASE ORAL DAILY
Qty: 90 TABLET | Refills: 3 | Status: SHIPPED | OUTPATIENT
Start: 2018-08-20 | End: 2019-01-14 | Stop reason: SDUPTHER

## 2018-08-20 ASSESSMENT — PATIENT HEALTH QUESTIONNAIRE - PHQ9
SUM OF ALL RESPONSES TO PHQ QUESTIONS 1-9: 0
SUM OF ALL RESPONSES TO PHQ QUESTIONS 1-9: 0
1. LITTLE INTEREST OR PLEASURE IN DOING THINGS: 0
SUM OF ALL RESPONSES TO PHQ9 QUESTIONS 1 & 2: 0
2. FEELING DOWN, DEPRESSED OR HOPELESS: 0

## 2018-08-20 NOTE — PATIENT INSTRUCTIONS
There are a lot of ways to fit activity into your life. You can:  ¨ Walk or bike to the store. Or walk with a friend, or walk the dog. ¨ Mow the lawn, rake leaves, shovel snow, or do some gardening. ¨ Use the stairs instead of the elevator, at least for a few floors. · Change your thinking. Your thoughts have a lot to do with how you feel and what you do. When you're trying to reach a healthy weight, changing how you think about certain things may help. Here are some ideas:  ¨ Don't compare yourself to others. Healthy bodies come in all shapes and sizes. ¨ Pay attention to how hungry or full you feel. When you eat, be aware of why you're eating and how much you're eating. ¨ Focus on improving your health instead of dieting. Dieting almost never works over the long term. · Ask your doctor about other health professionals who can help you reach a healthy weight. ¨ A dietitian can help you make healthy changes in your diet. ¨ An exercise specialist or  can help you develop a safe and effective exercise program.  ¨ A counselor or psychiatrist can help you cope with issues such as depression, anxiety, or family problems that can make it hard to focus on reaching a healthy weight. · Get support from your family, your doctor, your friends, a support group-and support yourself. Where can you learn more? Go to https://S-cubismludaMoncai.ExpertBeacon. org and sign in to your [employer] account. Enter P136 in the [facility] box to learn more about \"When You Are Overweight: Care Instructions. \"     If you do not have an account, please click on the \"Sign Up Now\" link. Current as of: September 10, 2017  Content Version: 11.7  © 2174-1645 [employer], [employer]. Care instructions adapted under license by [facility] ([facility]).  If you have questions about a medical condition or this instruction, always ask your healthcare professional. [address]  any warranty or liability for your use of this information.

## 2018-08-20 NOTE — PROGRESS NOTES
EYE SURGERY      right laser for retinal tear    LAMINECTOMY  1988    L4    MA TOTAL HIP ARTHROPLASTY Right 8/3/2018    RIGHT TOTAL HIP ARTHROPLASTY performed by Eriberto Suggs MD at [address] [facility] chest squamous cell cancer    TONSILLECTOMY      as child       family history includes Arthritis in his brother and father; Breast Cancer in his mother; Cancer in his brother and father; Depression in his daughter; Heart Disease in his father; High Blood Pressure in his brother; No Known Problems in his sister; Other in his daughter; Sleep Apnea in his daughter; Stroke in his father; Thyroid Disease in his daughter. Social History     Social History    Marital status:      Spouse name: N/A    Number of children: N/A    Years of education: N/A     Occupational History    Not on file.      Social History Main Topics    Smoking status: Never Smoker    Smokeless tobacco: Never Used    Alcohol use 0.0 oz/week      Comment: 2-3 glasses wine per day    Drug use: No    Sexual activity: Not on file     Other Topics Concern    Not on file     Social History Narrative    No narrative on file       Allergies   Allergen Reactions    Seasonal Other (See Comments)     Sinus sx    Azithromycin Rash       Review of Systems - General ROS: negative  Psychological ROS: negative  ENT ROS: negative  Hematological and Lymphatic ROS: negative  Endocrine ROS: negative  Respiratory ROS: no cough, shortness of breath, or wheezing  Cardiovascular ROS: no chest pain or dyspnea on exertion  Gastrointestinal ROS: no abdominal pain, change in bowel habits, or black or bloody stools  Genito-Urinary ROS: no dysuria, trouble voiding, or hematuria  Musculoskeletal ROS: pain  Neurological ROS: no TIA or stroke symptoms  Dermatological ROS: negative    Blood pressure 124/80, pulse 91, temperature 98.5 °F (36.9 °C), temperature source Temporal, height 6' (1.829 m), weight 211 lb (95.7 kg), SpO2 95

## 2018-09-06 ENCOUNTER — HOSPITAL ENCOUNTER (OUTPATIENT)
Dept: PHYSICAL THERAPY | Age: 68
Setting detail: THERAPIES SERIES
Discharge: HOME | End: 2018-09-06
Payer: MEDICARE

## 2018-09-06 PROCEDURE — G8979 MOBILITY GOAL STATUS: HCPCS

## 2018-09-06 PROCEDURE — 97162 PT EVAL MOD COMPLEX 30 MIN: CPT

## 2018-09-06 PROCEDURE — G8978 MOBILITY CURRENT STATUS: HCPCS

## 2018-09-06 NOTE — PROGRESS NOTES
Independent  Quality of Gait: FWD flexed at waist, good julio step length and speed  Distance: throughout clinic [] yes  [] no        Body structures, Functions, Activity limitations: Decreased functional mobility , Decreased ROM, Decreased strength, Decreased endurance, Decreased balance  Assessment: Pt presents with difficulty ambulating and limited mobility s/p Rt THR. Pt demonstrates decreased julio hip AROM as well as decreased julio knee ext. Pt also demonstrates weakness in his hip abductors and likely in his extensors but unable to test to due to pt unwilling to lie prone. PT ambulates with FWD flexed posture with s/c with good julio step length and speed. Pt would benefit from further skilled PT to improve his ROM , strength and overall mobility. Prognosis: Good  Discharge Recommendations: Continue to assess pending progress    G-Codes  PT G-Codes  Functional Assessment Tool Used: LEFS and clinical judgement  Score: 42/80  Functional Limitation: Mobility: Walking and moving around  Mobility: Walking and Moving Around Current Status (): At least 40 percent but less than 60 percent impaired, limited or restricted  Mobility: Walking and Moving Around Goal Status (): At least 20 percent but less than 40 percent impaired, limited or restricted    PLAN: [x] Evaluate and Treat  Frequency/Duration:  Plan  Times per week: 2  Plan weeks: 4-5  Current Treatment Recommendations: Strengthening, ROM, Balance Training, Functional Mobility Training, Transfer Training, Gait Training, Stair training, Neuromuscular Re-education, Manual Therapy - Soft Tissue Mobilization, Home Exercise Program, Patient/Caregiver Education & Training, Equipment Evaluation, Education, & procurement, Modalities, Integrated Dry Needling  Plan Comment: Trasnfer care to Ovi Madrigal PT, DPT     Patient Status:[x] Continue/ Initiate plan of Care    [] Discharge PT. Recommend pt continue with HEP.      [] Additional visits requested, Please re-certify for additional visits:          Signature: Electronically signed by Jaiden Lovell PT on 9/6/18 at 1:38 PM      If you have any questions or concerns, please don't hesitate to call. Thank you for your referral.    I have reviewed this plan of care and certify a need for medically necessary rehabilitation services.     Physician Signature:__________________________________________________________  Date:  Please sign and return

## 2018-09-12 ENCOUNTER — HOSPITAL ENCOUNTER (OUTPATIENT)
Dept: PHYSICAL THERAPY | Age: 68
Setting detail: THERAPIES SERIES
Discharge: HOME | End: 2018-09-12
Payer: MEDICARE

## 2018-09-12 PROCEDURE — 97110 THERAPEUTIC EXERCISES: CPT

## 2018-09-12 ASSESSMENT — PAIN DESCRIPTION - DESCRIPTORS: DESCRIPTORS: ACHING;SORE

## 2018-09-12 ASSESSMENT — PAIN SCALES - GENERAL: PAINLEVEL_OUTOF10: 3

## 2018-09-12 ASSESSMENT — PAIN DESCRIPTION - PAIN TYPE: TYPE: ACUTE PAIN

## 2018-09-12 NOTE — PROGRESS NOTES
[id number] [address]  Outpatient Physical Therapy    Treatment Note        Date: 2018  Patient: Kaycee Oreilly  : 1950  ACCT #: [de-identified]  Referring Practitioner: Dr. Cira Barker  Diagnosis: Degenerative joint disease of hip s/p Rt THR    Visit Information:  PT Visit Information  PT Insurance Information: Medicare  Total # of Visits to Date: 2  Plan of Care/Certification Expiration Date: 18  No Show: 0  Progress Note Due Date: 10/05/18  Canceled Appointment: 0  Progress Note Counter: 2/10  Subjective: Pt reports R hip 1-2/10 and L hip 3/10. HEP Compliance:  [x] Good [] Fair [] Poor [] Reports not doing due to:    Vital Signs  Patient Currently in Pain: Yes   Pain Screening  Patient Currently in Pain: Yes  Pain Assessment  Pain Assessment: 0-10  Pain Level: 3  Pain Type: Acute pain  Pain Descriptors: Aching; Sore     OBJECTIVE:   Exercises  Exercise 1: NS L1 x5 min  Exercise 2: Hams str 30s x3 julio  Exercise 3: SAQ x10  Exercise 4: SLR  x10  Exercise 5: S/L hip abd x10  Exercise 6: hip add/abd ball/YTB x10  Exercise 7: Bridges*  Exercise 8: Hip ext into Pball*  Exercise 9: Sidelying hip abd*  Exercise 10: Clams*  Exercise 20: HEP: seated hams str                      Strength: [x] NT  [] MMT completed:                ROM: [x] NT  [] ROM measurements:                               *Indicates exercise, modality, or manual techniques to be initiated when appropriate    Assessment: Body structures, Functions, Activity limitations: Decreased functional mobility , Decreased ROM, Decreased strength, Decreased endurance, Decreased balance  Assessment: Pt tolerated PRE without incident. Treatment Diagnosis: Difficulty ambulating s/p Rt THR  Prognosis: Good       Goals:     Long term goals  Time Frame for Long term goals : 4-5 weeks  Long term goal 1: Improve Rt hip AROM to WNL to increase ease with mobility.   Long term goal 2: Pt will report no pain with standing and walking activities. Long term goal 3: Improve julio hip strength to >/= 4+/5 to improve stability with ambulation. Long term goal 4: LEFS >/= 51/80 to improve QOL and allow pt to return to completing yardwork independently. Long term goal 5: Pt will ambulate without an AD with improved posture and quality throughout the clinic S/I. Progress toward goals:    POST-PAIN       Pain Rating (0-10 pain scale):   2/10   Location and pain description same as pre-treatment unless indicated. Action: [] NA   [] Perform HEP  [] Meds as prescribed  [] Modalities as prescribed   [] Call Physician     Frequency/Duration:  Plan  Times per week: 2  Plan weeks: 4-5  Current Treatment Recommendations: Strengthening, ROM, Balance Training, Functional Mobility Training, Transfer Training, Gait Training, Stair training, Neuromuscular Re-education, Manual Therapy - Soft Tissue Mobilization, Home Exercise Program, Patient/Caregiver Education & Training, Equipment Evaluation, Education, & procurement, Modalities, Integrated Dry Needling  Plan Comment: Trasnfer care to Ovi Madrigal PT, DPT     Pt to continue current HEP. See objective section for any therapeutic exercise changes, additions or modifications this date.          PT Individual Minutes  Time In: 1520  Time Out: 1600  Minutes: 40  Timed Code Treatment Minutes: 40 Minutes  Procedure Minutes:0    Signature:  Electronically signed by Ovi Madrigal PT on 9/12/18 at 3:20 PM

## 2018-09-14 ENCOUNTER — HOSPITAL ENCOUNTER (OUTPATIENT)
Dept: PHYSICAL THERAPY | Age: 68
Setting detail: THERAPIES SERIES
Discharge: HOME | End: 2018-09-14
Payer: MEDICARE

## 2018-09-14 PROCEDURE — 97110 THERAPEUTIC EXERCISES: CPT

## 2018-09-14 ASSESSMENT — PAIN DESCRIPTION - ORIENTATION: ORIENTATION: RIGHT;LEFT

## 2018-09-14 ASSESSMENT — PAIN DESCRIPTION - DESCRIPTORS: DESCRIPTORS: ACHING

## 2018-09-14 ASSESSMENT — PAIN SCALES - GENERAL: PAINLEVEL_OUTOF10: 3

## 2018-09-14 ASSESSMENT — PAIN DESCRIPTION - PAIN TYPE: TYPE: ACUTE PAIN;CHRONIC PAIN

## 2018-09-17 ENCOUNTER — HOSPITAL ENCOUNTER (OUTPATIENT)
Dept: PHYSICAL THERAPY | Age: 68
Setting detail: THERAPIES SERIES
Discharge: HOME | End: 2018-09-17
Payer: MEDICARE

## 2018-09-17 PROCEDURE — 97110 THERAPEUTIC EXERCISES: CPT

## 2018-09-17 ASSESSMENT — PAIN DESCRIPTION - PAIN TYPE: TYPE: CHRONIC PAIN

## 2018-09-17 ASSESSMENT — PAIN DESCRIPTION - ORIENTATION: ORIENTATION: LEFT

## 2018-09-17 ASSESSMENT — PAIN DESCRIPTION - DESCRIPTORS: DESCRIPTORS: ACHING

## 2018-09-17 NOTE — PROGRESS NOTES
balance  Assessment: Trialed 1# wt w/ SLR's though inc quad lag noted. Vcing to initiate ea rep w/ QS and no wts to dec lag. Initiated clamshells  w/ pillow between knees w/ good mina though ROM limited. Increased reps w/ S/L hip ABD tolerated. Treatment Diagnosis: Difficulty ambulating s/p Rt THR     Goals:   Long term goals  Time Frame for Long term goals : 4-5 weeks  Long term goal 1: Improve Rt hip AROM to WNL to increase ease with mobility. Long term goal 2: Pt will report no pain with standing and walking activities. Long term goal 3: Improve julio hip strength to >/= 4+/5 to improve stability with ambulation. Long term goal 4: LEFS >/= 51/80 to improve QOL and allow pt to return to completing yardwork independently. Long term goal 5: Pt will ambulate without an AD with improved posture and quality throughout the clinic S/I. Progress toward goals: B hip strength, Rt hip ROM. POST-PAIN       Pain Rating (0-10 pain scale):   0/10 Rt hip, 1/10 Lt hip   Location and pain description same as pre-treatment unless indicated. Action: [] NA   [x] Perform HEP  [] Meds as prescribed  [x] Modalities as prescribed   [] Call Physician     Frequency/Duration:  Plan  Times per week: 2  Plan weeks: 4-5  Current Treatment Recommendations: Strengthening, ROM, Balance Training, Functional Mobility Training, Transfer Training, Gait Training, Stair training, Neuromuscular Re-education, Manual Therapy - Soft Tissue Mobilization, Home Exercise Program, Patient/Caregiver Education & Training, Equipment Evaluation, Education, & procurement, Modalities, Integrated Dry Needling  Plan Comment: Edinsnfer care to Ovi Madrigal PT, DPT     Pt to continue current HEP. See objective section for any therapeutic exercise changes, additions or modifications this date.     PT Individual Minutes  Time In: 1120  Time Out: 3500  Minutes: 43  Timed Code Treatment Minutes: 43 Minutes  Procedure Minutes: N/A    Signature:  Electronically signed by Jany Robertson, PTA on 9/17/18 at 12:07 PM

## 2018-09-18 ENCOUNTER — APPOINTMENT (OUTPATIENT)
Dept: PHYSICAL THERAPY | Age: 68
End: 2018-09-18
Payer: MEDICARE

## 2018-09-20 ENCOUNTER — HOSPITAL ENCOUNTER (OUTPATIENT)
Dept: PHYSICAL THERAPY | Age: 68
Setting detail: THERAPIES SERIES
Discharge: HOME | End: 2018-09-20
Payer: MEDICARE

## 2018-09-20 PROCEDURE — 97110 THERAPEUTIC EXERCISES: CPT

## 2018-09-20 ASSESSMENT — PAIN DESCRIPTION - PAIN TYPE: TYPE: CHRONIC PAIN

## 2018-09-20 ASSESSMENT — PAIN DESCRIPTION - ORIENTATION: ORIENTATION: LEFT

## 2018-09-20 ASSESSMENT — PAIN DESCRIPTION - LOCATION: LOCATION: HIP

## 2018-09-20 ASSESSMENT — PAIN SCALES - GENERAL: PAINLEVEL_OUTOF10: 1

## 2018-09-20 ASSESSMENT — PAIN DESCRIPTION - DESCRIPTORS: DESCRIPTORS: ACHING

## 2018-09-20 NOTE — PROGRESS NOTES
[address] [address]  Outpatient Physical Therapy    Treatment Note        Date: 2018  Patient: Kaycee Oreilly  : 1950  ACCT #: [de-identified]  Referring Practitioner: Dr. Cira Barker  Diagnosis: Degenerative joint disease of hip s/p Rt THR    Visit Information:  PT Visit Information  PT Insurance Information: Medicare  Total # of Visits to Date: 5  Plan of Care/Certification Expiration Date: 18  No Show: 0  Canceled Appointment: 0  Progress Note Counter: 5/10    Subjective: Pt states \"The surgical hip feels great, my left hip is still sore. \" Pt reports walking around [facility] [employer] ~7 times yesterday w/ good mina. Comments: RTD 18. HEP Compliance:  [x] Good [] Fair [] Poor [] Reports not doing due to:    Vital Signs  Patient Currently in Pain: Yes   Pain Screening  Patient Currently in Pain: Yes  Pain Assessment  Pain Assessment: 0-10  Pain Level: 1  Pain Type: Chronic pain  Pain Location: Hip  Pain Orientation: Left  Pain Descriptors: Aching    OBJECTIVE:   Exercises  Exercise 1: NS L3 x5 min  Exercise 2: seated Hams str 30s x3 julio  Exercise 3: SAQ 3\"x15  w/ 1.5# B   Exercise 4: SLR  x12 B (focus on QS reset)   Exercise 5: supine heels slides hip abd/hip and flexion/ext 5 \"hold x10  Exercise 6: hip add/abd ball/RTB x15  Exercise 7: Bridges 3\"x12  Exercise 8: HS curl with pball 5\"x15   Exercise 9: S/L hip abd x10 B (dec ROM- pillow between knees)   Exercise 10: Hip ext into Pball*  Exercise 11: Clams x10 B (w/ pillow between knees)   Exercise 12: QS 5\"x10 B   Exercise 13:  Mod ifrah stretch Rt 3x30\", progress to prone lying when able*   Exercise 14: Seated hip IR/ER (w/ ball between knees) x15 Rt  Exercise 20: HEP: SLR's, S/L ABD, SAQ, QS, bridges, H/L hip ABD/ADD, mod ifrah stretch     Strength: [x] NT  [] MMT completed:     ROM: [] NT  [x] ROM measurements:  AROM RLE (degrees)  RLE General AROM: Hip flex 90deg, Ext lacking 8 deg (w/ QS) , Knee ext lacking 7 deg (w/ QS) , hip abd 12 deg (w/o compensation)       Manual:   Manual therapy  PROM: Rt hip ROM all planes NV*     Modalities:  Modalities  Cryotherapy (Minutes\Location): PRN- Pt declined, will ice at home      *Indicates exercise, modality, or manual techniques to be initiated when appropriate    Assessment: Body structures, Functions, Activity limitations: Decreased functional mobility , Decreased ROM, Decreased strength, Decreased endurance, Decreased balance  Assessment: Added QS's and mod ifrah stretch to improve Rt hip ext ROM w/ good mina. Mild improvements in Rt hip/knee ext though remains limited. Will progress to prone lying when able. Progressed HEP this date. Treatment Diagnosis: Difficulty ambulating s/p Rt THR  Prognosis: Good     Goals:   Long term goals  Time Frame for Long term goals : 4-5 weeks  Long term goal 1: Improve Rt hip AROM to WNL to increase ease with mobility. Long term goal 2: Pt will report no pain with standing and walking activities. Long term goal 3: Improve julio hip strength to >/= 4+/5 to improve stability with ambulation. Long term goal 4: LEFS >/= 51/80 to improve QOL and allow pt to return to completing yardwork independently. Long term goal 5: Pt will ambulate without an AD with improved posture and quality throughout the clinic S/I. Progress toward goals: Rt hip ROM, B hip strength     POST-PAIN       Pain Rating (0-10 pain scale):   1/10 Lt hip, 0/10 Rt   Location and pain description same as pre-treatment unless indicated.    Action: [] NA   [x] Perform HEP  [] Meds as prescribed  [x] Modalities as prescribed   [] Call Physician     Frequency/Duration:  Plan  Times per week: 2  Plan weeks: 4-5  Current Treatment Recommendations: Strengthening, ROM, Balance Training, Functional Mobility Training, Transfer Training, Gait Training, Stair training, Neuromuscular Re-education, Manual Therapy - Soft Tissue Mobilization, Home Exercise Program, Patient/Caregiver Education & Training, Equipment

## 2018-09-26 ENCOUNTER — HOSPITAL ENCOUNTER (OUTPATIENT)
Dept: PHYSICAL THERAPY | Age: 68
Setting detail: THERAPIES SERIES
Discharge: HOME | End: 2018-09-26
Payer: MEDICARE

## 2018-09-26 PROCEDURE — 97110 THERAPEUTIC EXERCISES: CPT

## 2018-09-26 PROCEDURE — 97140 MANUAL THERAPY 1/> REGIONS: CPT

## 2018-09-26 PROCEDURE — 97112 NEUROMUSCULAR REEDUCATION: CPT

## 2018-09-26 ASSESSMENT — PAIN DESCRIPTION - LOCATION: LOCATION: HIP

## 2018-09-26 ASSESSMENT — PAIN DESCRIPTION - ORIENTATION: ORIENTATION: LEFT

## 2018-09-26 ASSESSMENT — PAIN DESCRIPTION - PAIN TYPE: TYPE: ACUTE PAIN

## 2018-09-26 ASSESSMENT — PAIN DESCRIPTION - DESCRIPTORS: DESCRIPTORS: ACHING;BURNING

## 2018-09-26 ASSESSMENT — PAIN SCALES - GENERAL: PAINLEVEL_OUTOF10: 0

## 2018-09-26 NOTE — PROGRESS NOTES
Except Hip Abd/Ext/ER 3+/5, IR 4-/5  Strength LLE  Strength LLE: WFL     ROM: [x] NT  [] ROM measurements:      Manual:   Manual therapy  PROM: Rt hip ROM all planes NV*   Other: goal assess/education/MMT 10 in      *Indicates exercise, modality, or manual techniques to be initiated when appropriate    Assessment: Body structures, Functions, Activity limitations: Decreased functional mobility , Decreased ROM, Decreased strength, Decreased endurance, Decreased balance  Assessment: Pt with improved understanding of treatment options to avoid irritating non surgical leg. Pt would benefit from cont strengthenng R Hip however educating on balancing activity to avoid compensations. Pt to consider aquatics if willing as an option for cont strengthening of R hip while protecting the L from irritation. Treatment Diagnosis: Difficulty ambulating s/p Rt THR  Prognosis: Good  Patient Education: HEP limited to top 5 to avoid over use. Goals:     Long term goals  Time Frame for Long term goals : 4-5 weeks  Long term goal 1: Improve Rt hip AROM to WNL to increase ease with mobility. Long term goal 2: Pt will report no pain with standing and walking activities. Long term goal 3: Improve julio hip strength to >/= 4+/5 to improve stability with ambulation. Long term goal 4: LEFS >/= 51/80 to improve QOL and allow pt to return to completing yardwork independently. Long term goal 5: Pt will ambulate without an AD with improved posture and quality throughout the clinic S/I. Progress toward goals: Amb Indep with st cane with mod deviations, See MMT for strength deficits    POST-PAIN       Pain Rating (0-10 pain scale):  0 /10   Location and pain description same as pre-treatment unless indicated.    Action: [x] NA   [] Perform HEP  [] Meds as prescribed  [] Modalities as prescribed   [] Call Physician     Frequency/Duration:  Plan  Times per week: 2  Plan weeks: 4-5  Current Treatment Recommendations: Strengthening, ROM,

## 2018-09-28 ENCOUNTER — HOSPITAL ENCOUNTER (OUTPATIENT)
Dept: PHYSICAL THERAPY | Age: 68
Setting detail: THERAPIES SERIES
Discharge: HOME | End: 2018-09-28
Payer: MEDICARE

## 2018-09-28 PROCEDURE — 97140 MANUAL THERAPY 1/> REGIONS: CPT

## 2018-09-28 PROCEDURE — 97110 THERAPEUTIC EXERCISES: CPT

## 2018-09-28 ASSESSMENT — PAIN DESCRIPTION - DESCRIPTORS: DESCRIPTORS: ACHING;BURNING

## 2018-09-28 ASSESSMENT — PAIN DESCRIPTION - LOCATION: LOCATION: HIP

## 2018-09-28 ASSESSMENT — PAIN DESCRIPTION - ORIENTATION: ORIENTATION: LEFT

## 2018-09-28 ASSESSMENT — PAIN DESCRIPTION - PAIN TYPE: TYPE: ACUTE PAIN

## 2018-09-28 NOTE — PROGRESS NOTES
stretch. Frequent VCing req to relax during Rt hip PROM to achieve maximal range. Reiterated aquatic therapy option though pt expressing no interest in pool stating \"I don't care much for the water. \"   Treatment Diagnosis: Difficulty ambulating s/p Rt THR  Prognosis: Good     Goals:   Long term goals  Time Frame for Long term goals : 4-5 weeks  Long term goal 1: Improve Rt hip AROM to WNL to increase ease with mobility. Long term goal 2: Pt will report no pain with standing and walking activities. Long term goal 3: Improve julio hip strength to >/= 4+/5 to improve stability with ambulation. Long term goal 4: LEFS >/= 51/80 to improve QOL and allow pt to return to completing yardwork independently. Long term goal 5: Pt will ambulate without an AD with improved posture and quality throughout the clinic S/I. Progress toward goals:    POST-PAIN       Pain Rating (0-10 pain scale):   /10   Location and pain description same as pre-treatment unless indicated. Action: [] NA   [] Perform HEP  [] Meds as prescribed  [] Modalities as prescribed   [] Call Physician     Frequency/Duration:  Plan  Times per week: 2  Plan weeks: 4-5  Current Treatment Recommendations: Strengthening, ROM, Balance Training, Functional Mobility Training, Transfer Training, Gait Training, Stair training, Neuromuscular Re-education, Manual Therapy - Soft Tissue Mobilization, Home Exercise Program, Patient/Caregiver Education & Training, Equipment Evaluation, Education, & procurement, Modalities, Integrated [facility], Aquatics  Plan Comment: Trasnfer care to Shivam Pendleton PT, DPT     Pt to continue current HEP. See objective section for any therapeutic exercise changes, additions or modifications this date.     PT Individual Minutes  Time In: 0918  Time Out: 1003  Minutes: 38  Timed Code Treatment Minutes: 38 Minutes  Procedure Minutes: N/A    Signature:  Electronically signed by Raine Gonzalez PTA on 9/28/18 at 10:18 AM

## 2018-10-01 ENCOUNTER — HOSPITAL ENCOUNTER (OUTPATIENT)
Dept: PHYSICAL THERAPY | Age: 68
Setting detail: THERAPIES SERIES
Discharge: HOME | End: 2018-10-01
Payer: MEDICARE

## 2018-10-01 PROCEDURE — 97110 THERAPEUTIC EXERCISES: CPT

## 2018-10-04 ENCOUNTER — HOSPITAL ENCOUNTER (OUTPATIENT)
Dept: PHYSICAL THERAPY | Age: 68
Setting detail: THERAPIES SERIES
Discharge: HOME | End: 2018-10-04
Payer: MEDICARE

## 2018-10-04 PROCEDURE — G8978 MOBILITY CURRENT STATUS: HCPCS

## 2018-10-04 PROCEDURE — 97140 MANUAL THERAPY 1/> REGIONS: CPT

## 2018-10-04 PROCEDURE — G8979 MOBILITY GOAL STATUS: HCPCS

## 2018-10-04 PROCEDURE — G8980 MOBILITY D/C STATUS: HCPCS

## 2018-10-04 PROCEDURE — 97110 THERAPEUTIC EXERCISES: CPT

## 2018-10-04 ASSESSMENT — PAIN DESCRIPTION - ORIENTATION: ORIENTATION: RIGHT

## 2018-10-04 ASSESSMENT — PAIN DESCRIPTION - LOCATION: LOCATION: HIP

## 2018-10-04 ASSESSMENT — PAIN DESCRIPTION - DESCRIPTORS: DESCRIPTORS: SORE

## 2018-10-04 ASSESSMENT — PAIN SCALES - GENERAL: PAINLEVEL_OUTOF10: 1

## 2018-10-04 ASSESSMENT — PAIN DESCRIPTION - PAIN TYPE: TYPE: ACUTE PAIN

## 2018-10-04 NOTE — PROGRESS NOTES
compensation), Seated: IR 26 deg, ER 20 deg       Manual:   Manual therapy  PROM: Rt hip ROM all planes 8 min     Modalities:  Modalities  Cryotherapy (Minutes\Location): PRN- Pt declined, will ice at home      *Indicates exercise, modality, or manual techniques to be initiated when appropriate    Assessment: Body structures, Functions, Activity limitations: Decreased functional mobility , Decreased ROM, Decreased strength, Decreased endurance, Decreased balance  Assessment: Pt self reports 60% normal function currently w/ Lt hip pain causing most limitations. Pt scheduled for Lt THR on 11/16/18. Rt hip pain ranging from 0-2/10 at most w/ all activity. Pt manages pain w/ ice to B hips daily and Advil as needed. Overall improved hip AROM and strength though ext ROM most limited. Treatment Diagnosis: Difficulty ambulating s/p Rt THR      Goals:   Long term goals  Time Frame for Long term goals : 4-5 weeks  Long term goal 1: Improve Rt hip AROM to WNL to increase ease with mobility. Long term goal 2: Pt will report no pain with standing and walking activities. Long term goal 3: Improve julio hip strength to >/= 4+/5 to improve stability with ambulation. Long term goal 4: LEFS >/= 51/80 to improve QOL and allow pt to return to completing yardwork independently. Long term goal 5: Pt will ambulate without an AD with improved posture and quality throughout the clinic S/I. Progress toward goals:  Please refer to D/C note for details. POST-PAIN       Pain Rating (0-10 pain scale):   1/10   Location and pain description same as pre-treatment unless indicated. Action: [] NA   [x] Perform HEP  [] Meds as prescribed  [] Modalities as prescribed   [] Call Physician     Frequency/Duration:  Plan  Plan Comment: D/C this date per pt. Pt to continue current HEP. See objective section for any therapeutic exercise changes, additions or modifications this date.     PT Individual Minutes  Time In: 3246  Time Out:

## 2018-10-08 ENCOUNTER — APPOINTMENT (OUTPATIENT)
Dept: PHYSICAL THERAPY | Age: 68
End: 2018-10-08
Payer: MEDICARE

## 2018-10-23 ENCOUNTER — OFFICE VISIT (OUTPATIENT)
Dept: PULMONOLOGY | Age: 68
End: 2018-10-23
Payer: MEDICARE

## 2018-10-23 VITALS
HEIGHT: 72 IN | RESPIRATION RATE: 16 BRPM | DIASTOLIC BLOOD PRESSURE: 70 MMHG | HEART RATE: 80 BPM | SYSTOLIC BLOOD PRESSURE: 136 MMHG | WEIGHT: 214 LBS | TEMPERATURE: 98.1 F | BODY MASS INDEX: 28.99 KG/M2 | OXYGEN SATURATION: 96 %

## 2018-10-23 DIAGNOSIS — G47.33 OSA (OBSTRUCTIVE SLEEP APNEA): Primary | ICD-10-CM

## 2018-10-23 DIAGNOSIS — E66.3 OVER WEIGHT: ICD-10-CM

## 2018-10-23 PROCEDURE — 4040F PNEUMOC VAC/ADMIN/RCVD: CPT | Performed by: INTERNAL MEDICINE

## 2018-10-23 PROCEDURE — G8427 DOCREV CUR MEDS BY ELIG CLIN: HCPCS | Performed by: INTERNAL MEDICINE

## 2018-10-23 PROCEDURE — 3017F COLORECTAL CA SCREEN DOC REV: CPT | Performed by: INTERNAL MEDICINE

## 2018-10-23 PROCEDURE — 1101F PT FALLS ASSESS-DOCD LE1/YR: CPT | Performed by: INTERNAL MEDICINE

## 2018-10-23 PROCEDURE — 1123F ACP DISCUSS/DSCN MKR DOCD: CPT | Performed by: INTERNAL MEDICINE

## 2018-10-23 PROCEDURE — G8417 CALC BMI ABV UP PARAM F/U: HCPCS | Performed by: INTERNAL MEDICINE

## 2018-10-23 PROCEDURE — G8484 FLU IMMUNIZE NO ADMIN: HCPCS | Performed by: INTERNAL MEDICINE

## 2018-10-23 PROCEDURE — 1036F TOBACCO NON-USER: CPT | Performed by: INTERNAL MEDICINE

## 2018-10-23 PROCEDURE — 99213 OFFICE O/P EST LOW 20 MIN: CPT | Performed by: INTERNAL MEDICINE

## 2018-10-23 RX ORDER — PENICILLIN V POTASSIUM 500 MG/1
TABLET ORAL
Refills: 0 | COMMUNITY
Start: 2018-10-20 | End: 2018-11-01

## 2018-10-23 ASSESSMENT — ENCOUNTER SYMPTOMS
COUGH: 0
DIARRHEA: 0
CHEST TIGHTNESS: 0
WHEEZING: 0
VOICE CHANGE: 0
EYE ITCHING: 0
ABDOMINAL PAIN: 0
RHINORRHEA: 0
SHORTNESS OF BREATH: 0
NAUSEA: 0
SORE THROAT: 0
VOMITING: 0

## 2018-10-23 NOTE — PROGRESS NOTES
 Sleep Apnea Daughter      Social History     Social History    Marital status:      Spouse name: N/A    Number of children: N/A    Years of education: N/A     Occupational History    Not on file. Social History Main Topics    Smoking status: Never Smoker    Smokeless tobacco: Never Used    Alcohol use 0.0 oz/week      Comment: 2-3 glasses wine per day    Drug use: No    Sexual activity: Not on file     Other Topics Concern    Not on file     Social History Narrative    No narrative on file         Review of Systems   Constitutional: Negative for chills, diaphoresis, fatigue and fever. HENT: Negative for congestion, mouth sores, nosebleeds, postnasal drip, rhinorrhea, sneezing, sore throat and voice change. Eyes: Negative for itching and visual disturbance. Respiratory: Negative for cough, chest tightness, shortness of breath and wheezing. Cardiovascular: Negative. Negative for chest pain, palpitations and leg swelling. Gastrointestinal: Negative for abdominal pain, diarrhea, nausea and vomiting. Genitourinary: Negative for difficulty urinating and hematuria. Musculoskeletal: Negative for arthralgias, joint swelling and myalgias. Skin: Negative for rash. Allergic/Immunologic: Negative for environmental allergies. Neurological: Negative for dizziness, tremors, weakness and headaches. Psychiatric/Behavioral: Positive for sleep disturbance. Negative for behavioral problems. Objective:     Vitals:    10/23/18 1103   BP: 136/70   Pulse: 80   Resp: 16   Temp: 98.1 °F (36.7 °C)   TempSrc: Tympanic   SpO2: 96%   Weight: 214 lb (97.1 kg)   Height: 6' (1.829 m)         Physical Exam   Constitutional: He is oriented to person, place, and time. He appears well-developed and well-nourished. HENT:   Head: Normocephalic and atraumatic.    Nose: Nose normal.   Mouth/Throat: Oropharynx is clear and moist.   Mallampati  IV   Eyes: Pupils are equal, round, and reactive to

## 2018-11-01 ENCOUNTER — HOSPITAL ENCOUNTER (OUTPATIENT)
Dept: PREADMISSION TESTING | Age: 68
Discharge: HOME | End: 2018-11-05
Payer: MEDICARE

## 2018-11-01 VITALS
HEART RATE: 83 BPM | RESPIRATION RATE: 16 BRPM | BODY MASS INDEX: 29.09 KG/M2 | HEIGHT: 72 IN | SYSTOLIC BLOOD PRESSURE: 158 MMHG | TEMPERATURE: 98.1 F | DIASTOLIC BLOOD PRESSURE: 82 MMHG | WEIGHT: 214.8 LBS | OXYGEN SATURATION: 98 %

## 2018-11-01 PROBLEM — M16.12 DEGENERATIVE JOINT DISEASE OF LEFT HIP: Status: ACTIVE | Noted: 2018-11-01

## 2018-11-01 PROBLEM — K59.00 CONSTIPATION: Chronic | Status: ACTIVE | Noted: 2018-11-01

## 2018-11-01 LAB
ABO/RH: NORMAL
ANION GAP SERPL CALCULATED.3IONS-SCNC: 15 MEQ/L (ref 7–13)
ANTIBODY SCREEN: NORMAL
BILIRUBIN URINE: NEGATIVE
BLOOD, URINE: NEGATIVE
BUN BLDV-MCNC: 11 MG/DL (ref 8–23)
CALCIUM SERPL-MCNC: 9.4 MG/DL (ref 8.6–10.2)
CHLORIDE BLD-SCNC: 98 MEQ/L (ref 98–107)
CLARITY: CLEAR
CO2: 21 MEQ/L (ref 22–29)
COLOR: YELLOW
CREAT SERPL-MCNC: 0.66 MG/DL (ref 0.7–1.2)
GFR AFRICAN AMERICAN: >60
GFR NON-AFRICAN AMERICAN: >60
GLUCOSE BLD-MCNC: 93 MG/DL (ref 74–109)
GLUCOSE URINE: NEGATIVE MG/DL
HCT VFR BLD CALC: 40.5 % (ref 42–52)
HEMOGLOBIN: 14 G/DL (ref 14–18)
INR BLD: 1.1
KETONES, URINE: NEGATIVE MG/DL
LEUKOCYTE ESTERASE, URINE: NEGATIVE
MCH RBC QN AUTO: 30.8 PG (ref 27–31.3)
MCHC RBC AUTO-ENTMCNC: 34.4 % (ref 33–37)
MCV RBC AUTO: 89.5 FL (ref 80–100)
NITRITE, URINE: NEGATIVE
PDW BLD-RTO: 13.8 % (ref 11.5–14.5)
PH UA: 7 (ref 5–9)
PLATELET # BLD: 169 K/UL (ref 130–400)
POTASSIUM SERPL-SCNC: 4.5 MEQ/L (ref 3.5–5.1)
PROTEIN UA: NEGATIVE MG/DL
PROTHROMBIN TIME: 11.2 SEC (ref 9.6–12.3)
RBC # BLD: 4.53 M/UL (ref 4.7–6.1)
SODIUM BLD-SCNC: 134 MEQ/L (ref 132–144)
SPECIFIC GRAVITY UA: 1.01 (ref 1–1.03)
URINE REFLEX TO CULTURE: NORMAL
UROBILINOGEN, URINE: 1 E.U./DL
WBC # BLD: 4.9 K/UL (ref 4.8–10.8)

## 2018-11-01 PROCEDURE — 86900 BLOOD TYPING SEROLOGIC ABO: CPT

## 2018-11-01 PROCEDURE — 85027 COMPLETE CBC AUTOMATED: CPT

## 2018-11-01 PROCEDURE — 85610 PROTHROMBIN TIME: CPT

## 2018-11-01 PROCEDURE — 86850 RBC ANTIBODY SCREEN: CPT

## 2018-11-01 PROCEDURE — 86901 BLOOD TYPING SEROLOGIC RH(D): CPT

## 2018-11-01 PROCEDURE — 80048 BASIC METABOLIC PNL TOTAL CA: CPT

## 2018-11-01 PROCEDURE — 81003 URINALYSIS AUTO W/O SCOPE: CPT

## 2018-11-01 RX ORDER — SODIUM CHLORIDE, SODIUM LACTATE, POTASSIUM CHLORIDE, CALCIUM CHLORIDE 600; 310; 30; 20 MG/100ML; MG/100ML; MG/100ML; MG/100ML
INJECTION, SOLUTION INTRAVENOUS CONTINUOUS
Status: CANCELLED | OUTPATIENT
Start: 2018-11-16

## 2018-11-01 RX ORDER — CEFAZOLIN SODIUM 2 G/50ML
2 SOLUTION INTRAVENOUS ONCE
Status: CANCELLED | OUTPATIENT
Start: 2018-11-16

## 2018-11-01 RX ORDER — SODIUM CHLORIDE 0.9 % (FLUSH) 0.9 %
10 SYRINGE (ML) INJECTION EVERY 12 HOURS SCHEDULED
Status: CANCELLED | OUTPATIENT
Start: 2018-11-16

## 2018-11-01 RX ORDER — LIDOCAINE HYDROCHLORIDE 10 MG/ML
1 INJECTION, SOLUTION EPIDURAL; INFILTRATION; INTRACAUDAL; PERINEURAL
Status: CANCELLED | OUTPATIENT
Start: 2018-11-16 | End: 2018-11-16

## 2018-11-01 RX ORDER — SODIUM CHLORIDE 0.9 % (FLUSH) 0.9 %
10 SYRINGE (ML) INJECTION PRN
Status: CANCELLED | OUTPATIENT
Start: 2018-11-16

## 2018-11-16 ENCOUNTER — HOSPITAL ENCOUNTER (INPATIENT)
Age: 68
LOS: 1 days | Discharge: HOME HEALTH | DRG: 470 | End: 2018-11-17
Attending: ORTHOPAEDIC SURGERY | Admitting: ORTHOPAEDIC SURGERY
Payer: MEDICARE

## 2018-11-16 ENCOUNTER — ANESTHESIA EVENT (OUTPATIENT)
Dept: OPERATING ROOM | Age: 68
DRG: 470 | End: 2018-11-16
Payer: MEDICARE

## 2018-11-16 ENCOUNTER — APPOINTMENT (OUTPATIENT)
Dept: GENERAL RADIOLOGY | Age: 68
DRG: 470 | End: 2018-11-16
Attending: ORTHOPAEDIC SURGERY
Payer: MEDICARE

## 2018-11-16 ENCOUNTER — ANESTHESIA (OUTPATIENT)
Dept: OPERATING ROOM | Age: 68
DRG: 470 | End: 2018-11-16
Payer: MEDICARE

## 2018-11-16 VITALS — OXYGEN SATURATION: 94 % | DIASTOLIC BLOOD PRESSURE: 51 MMHG | SYSTOLIC BLOOD PRESSURE: 88 MMHG | TEMPERATURE: 98.6 F

## 2018-11-16 DIAGNOSIS — Z96.642 STATUS POST TOTAL HIP REPLACEMENT, LEFT: Primary | ICD-10-CM

## 2018-11-16 LAB
ABO/RH: NORMAL
ANTIBODY SCREEN: NORMAL

## 2018-11-16 PROCEDURE — 3700000000 HC ANESTHESIA ATTENDED CARE: Performed by: ORTHOPAEDIC SURGERY

## 2018-11-16 PROCEDURE — 6370000000 HC RX 637 (ALT 250 FOR IP): Performed by: ORTHOPAEDIC SURGERY

## 2018-11-16 PROCEDURE — 97162 PT EVAL MOD COMPLEX 30 MIN: CPT

## 2018-11-16 PROCEDURE — 86900 BLOOD TYPING SEROLOGIC ABO: CPT

## 2018-11-16 PROCEDURE — 2580000003 HC RX 258: Performed by: NURSE PRACTITIONER

## 2018-11-16 PROCEDURE — 0SRB0JA REPLACEMENT OF LEFT HIP JOINT WITH SYNTHETIC SUBSTITUTE, UNCEMENTED, OPEN APPROACH: ICD-10-PCS | Performed by: ORTHOPAEDIC SURGERY

## 2018-11-16 PROCEDURE — 2500000003 HC RX 250 WO HCPCS: Performed by: NURSE ANESTHETIST, CERTIFIED REGISTERED

## 2018-11-16 PROCEDURE — 6370000000 HC RX 637 (ALT 250 FOR IP): Performed by: INTERNAL MEDICINE

## 2018-11-16 PROCEDURE — 86901 BLOOD TYPING SEROLOGIC RH(D): CPT

## 2018-11-16 PROCEDURE — 2500000003 HC RX 250 WO HCPCS: Performed by: NURSE PRACTITIONER

## 2018-11-16 PROCEDURE — 6360000002 HC RX W HCPCS: Performed by: NURSE PRACTITIONER

## 2018-11-16 PROCEDURE — 86850 RBC ANTIBODY SCREEN: CPT

## 2018-11-16 PROCEDURE — 2580000003 HC RX 258: Performed by: INTERNAL MEDICINE

## 2018-11-16 PROCEDURE — C1776 JOINT DEVICE (IMPLANTABLE): HCPCS | Performed by: ORTHOPAEDIC SURGERY

## 2018-11-16 PROCEDURE — 6370000000 HC RX 637 (ALT 250 FOR IP): Performed by: NURSE PRACTITIONER

## 2018-11-16 PROCEDURE — 2580000003 HC RX 258: Performed by: ORTHOPAEDIC SURGERY

## 2018-11-16 PROCEDURE — 97535 SELF CARE MNGMENT TRAINING: CPT

## 2018-11-16 PROCEDURE — 3700000001 HC ADD 15 MINUTES (ANESTHESIA): Performed by: ORTHOPAEDIC SURGERY

## 2018-11-16 PROCEDURE — 73501 X-RAY EXAM HIP UNI 1 VIEW: CPT

## 2018-11-16 PROCEDURE — G8988 SELF CARE GOAL STATUS: HCPCS

## 2018-11-16 PROCEDURE — 2720000010 HC SURG SUPPLY STERILE: Performed by: ORTHOPAEDIC SURGERY

## 2018-11-16 PROCEDURE — G8987 SELF CARE CURRENT STATUS: HCPCS

## 2018-11-16 PROCEDURE — 7100000001 HC PACU RECOVERY - ADDTL 15 MIN: Performed by: ORTHOPAEDIC SURGERY

## 2018-11-16 PROCEDURE — 7100000000 HC PACU RECOVERY - FIRST 15 MIN: Performed by: ORTHOPAEDIC SURGERY

## 2018-11-16 PROCEDURE — 88311 DECALCIFY TISSUE: CPT

## 2018-11-16 PROCEDURE — 2580000003 HC RX 258: Performed by: NURSE ANESTHETIST, CERTIFIED REGISTERED

## 2018-11-16 PROCEDURE — 6360000002 HC RX W HCPCS: Performed by: NURSE ANESTHETIST, CERTIFIED REGISTERED

## 2018-11-16 PROCEDURE — 88304 TISSUE EXAM BY PATHOLOGIST: CPT

## 2018-11-16 PROCEDURE — 94150 VITAL CAPACITY TEST: CPT

## 2018-11-16 PROCEDURE — G8978 MOBILITY CURRENT STATUS: HCPCS

## 2018-11-16 PROCEDURE — 6360000002 HC RX W HCPCS: Performed by: ORTHOPAEDIC SURGERY

## 2018-11-16 PROCEDURE — 1210000000 HC MED SURG R&B

## 2018-11-16 PROCEDURE — G8979 MOBILITY GOAL STATUS: HCPCS

## 2018-11-16 PROCEDURE — 3600000004 HC SURGERY LEVEL 4 BASE: Performed by: ORTHOPAEDIC SURGERY

## 2018-11-16 PROCEDURE — 86923 COMPATIBILITY TEST ELECTRIC: CPT

## 2018-11-16 PROCEDURE — 97166 OT EVAL MOD COMPLEX 45 MIN: CPT

## 2018-11-16 PROCEDURE — 2709999900 HC NON-CHARGEABLE SUPPLY: Performed by: ORTHOPAEDIC SURGERY

## 2018-11-16 PROCEDURE — C1713 ANCHOR/SCREW BN/BN,TIS/BN: HCPCS | Performed by: ORTHOPAEDIC SURGERY

## 2018-11-16 PROCEDURE — 3600000014 HC SURGERY LEVEL 4 ADDTL 15MIN: Performed by: ORTHOPAEDIC SURGERY

## 2018-11-16 DEVICE — HEAD FEMORAL COCR 12/14 36MM +3.5: Type: IMPLANTABLE DEVICE | Site: HIP | Status: FUNCTIONAL

## 2018-11-16 DEVICE — BONE SCREW 6.5X30 SELF-TAP: Type: IMPLANTABLE DEVICE | Site: HIP | Status: FUNCTIONAL

## 2018-11-16 DEVICE — IMPLANTABLE DEVICE: Type: IMPLANTABLE DEVICE | Site: HIP | Status: FUNCTIONAL

## 2018-11-16 DEVICE — KIT THR TRABECULAR MTL STEM CUP XLPE LNR: Type: IMPLANTABLE DEVICE | Site: HIP | Status: FUNCTIONAL

## 2018-11-16 DEVICE — STEM FEM L138MM DIA13MM NEUT STR 12/14 TAPR STD NK EXT: Type: IMPLANTABLE DEVICE | Site: HIP | Status: FUNCTIONAL

## 2018-11-16 RX ORDER — ASPIRIN 81 MG/1
81 TABLET ORAL 2 TIMES DAILY
Qty: 60 TABLET | Refills: 0 | Status: ON HOLD | OUTPATIENT
Start: 2018-11-16 | End: 2022-04-22 | Stop reason: HOSPADM

## 2018-11-16 RX ORDER — OXYCODONE HYDROCHLORIDE 5 MG/1
5 TABLET ORAL EVERY 4 HOURS PRN
Status: DISCONTINUED | OUTPATIENT
Start: 2018-11-16 | End: 2018-11-17 | Stop reason: HOSPADM

## 2018-11-16 RX ORDER — METOPROLOL SUCCINATE 50 MG/1
50 TABLET, EXTENDED RELEASE ORAL DAILY
Status: DISCONTINUED | OUTPATIENT
Start: 2018-11-16 | End: 2018-11-17 | Stop reason: HOSPADM

## 2018-11-16 RX ORDER — DOCUSATE SODIUM 100 MG/1
100 CAPSULE, LIQUID FILLED ORAL 2 TIMES DAILY
Status: DISCONTINUED | OUTPATIENT
Start: 2018-11-16 | End: 2018-11-17 | Stop reason: HOSPADM

## 2018-11-16 RX ORDER — MAGNESIUM HYDROXIDE 1200 MG/15ML
LIQUID ORAL CONTINUOUS PRN
Status: COMPLETED | OUTPATIENT
Start: 2018-11-16 | End: 2018-11-16

## 2018-11-16 RX ORDER — METOCLOPRAMIDE HYDROCHLORIDE 5 MG/ML
10 INJECTION INTRAMUSCULAR; INTRAVENOUS
Status: DISCONTINUED | OUTPATIENT
Start: 2018-11-16 | End: 2018-11-16 | Stop reason: HOSPADM

## 2018-11-16 RX ORDER — SODIUM CHLORIDE 0.9 % (FLUSH) 0.9 %
10 SYRINGE (ML) INJECTION EVERY 12 HOURS SCHEDULED
Status: DISCONTINUED | OUTPATIENT
Start: 2018-11-16 | End: 2018-11-17 | Stop reason: HOSPADM

## 2018-11-16 RX ORDER — CELECOXIB 200 MG/1
400 CAPSULE ORAL ONCE
Status: COMPLETED | OUTPATIENT
Start: 2018-11-16 | End: 2018-11-16

## 2018-11-16 RX ORDER — SENNA AND DOCUSATE SODIUM 50; 8.6 MG/1; MG/1
1 TABLET, FILM COATED ORAL DAILY
Status: DISCONTINUED | OUTPATIENT
Start: 2018-11-16 | End: 2018-11-17 | Stop reason: HOSPADM

## 2018-11-16 RX ORDER — MORPHINE SULFATE 2 MG/ML
2 INJECTION, SOLUTION INTRAMUSCULAR; INTRAVENOUS
Status: ACTIVE | OUTPATIENT
Start: 2018-11-16 | End: 2018-11-17

## 2018-11-16 RX ORDER — CITALOPRAM 10 MG/1
10 TABLET ORAL DAILY
Status: DISCONTINUED | OUTPATIENT
Start: 2018-11-16 | End: 2018-11-17 | Stop reason: HOSPADM

## 2018-11-16 RX ORDER — 0.9 % SODIUM CHLORIDE 0.9 %
600 INTRAVENOUS SOLUTION INTRAVENOUS ONCE
Status: COMPLETED | OUTPATIENT
Start: 2018-11-16 | End: 2018-11-16

## 2018-11-16 RX ORDER — LIDOCAINE HYDROCHLORIDE 10 MG/ML
1 INJECTION, SOLUTION EPIDURAL; INFILTRATION; INTRACAUDAL; PERINEURAL
Status: COMPLETED | OUTPATIENT
Start: 2018-11-16 | End: 2018-11-16

## 2018-11-16 RX ORDER — LIDOCAINE HYDROCHLORIDE 10 MG/ML
INJECTION, SOLUTION INFILTRATION; PERINEURAL PRN
Status: DISCONTINUED | OUTPATIENT
Start: 2018-11-16 | End: 2018-11-16 | Stop reason: SDUPTHER

## 2018-11-16 RX ORDER — SODIUM CHLORIDE 9 MG/ML
INJECTION, SOLUTION INTRAVENOUS CONTINUOUS
Status: DISCONTINUED | OUTPATIENT
Start: 2018-11-16 | End: 2018-11-17 | Stop reason: HOSPADM

## 2018-11-16 RX ORDER — DEXAMETHASONE SODIUM PHOSPHATE 10 MG/ML
INJECTION INTRAMUSCULAR; INTRAVENOUS PRN
Status: DISCONTINUED | OUTPATIENT
Start: 2018-11-16 | End: 2018-11-16 | Stop reason: SDUPTHER

## 2018-11-16 RX ORDER — MIDAZOLAM HYDROCHLORIDE 1 MG/ML
INJECTION INTRAMUSCULAR; INTRAVENOUS PRN
Status: DISCONTINUED | OUTPATIENT
Start: 2018-11-16 | End: 2018-11-16 | Stop reason: SDUPTHER

## 2018-11-16 RX ORDER — HYDROCODONE BITARTRATE AND ACETAMINOPHEN 5; 325 MG/1; MG/1
1 TABLET ORAL PRN
Status: DISCONTINUED | OUTPATIENT
Start: 2018-11-16 | End: 2018-11-16 | Stop reason: HOSPADM

## 2018-11-16 RX ORDER — SODIUM CHLORIDE, SODIUM LACTATE, POTASSIUM CHLORIDE, CALCIUM CHLORIDE 600; 310; 30; 20 MG/100ML; MG/100ML; MG/100ML; MG/100ML
INJECTION, SOLUTION INTRAVENOUS CONTINUOUS
Status: DISCONTINUED | OUTPATIENT
Start: 2018-11-16 | End: 2018-11-16

## 2018-11-16 RX ORDER — SODIUM CHLORIDE, SODIUM LACTATE, POTASSIUM CHLORIDE, CALCIUM CHLORIDE 600; 310; 30; 20 MG/100ML; MG/100ML; MG/100ML; MG/100ML
INJECTION, SOLUTION INTRAVENOUS CONTINUOUS PRN
Status: DISCONTINUED | OUTPATIENT
Start: 2018-11-16 | End: 2018-11-16 | Stop reason: SDUPTHER

## 2018-11-16 RX ORDER — AMLODIPINE BESYLATE 5 MG/1
5 TABLET ORAL DAILY
Status: DISCONTINUED | OUTPATIENT
Start: 2018-11-17 | End: 2018-11-17 | Stop reason: HOSPADM

## 2018-11-16 RX ORDER — PROPOFOL 10 MG/ML
INJECTION, EMULSION INTRAVENOUS CONTINUOUS PRN
Status: DISCONTINUED | OUTPATIENT
Start: 2018-11-16 | End: 2018-11-16 | Stop reason: SDUPTHER

## 2018-11-16 RX ORDER — OXYCODONE HYDROCHLORIDE 5 MG/1
5 TABLET ORAL EVERY 4 HOURS PRN
Qty: 40 TABLET | Refills: 0 | Status: SHIPPED | OUTPATIENT
Start: 2018-11-16 | End: 2018-11-23

## 2018-11-16 RX ORDER — SODIUM CHLORIDE 0.9 % (FLUSH) 0.9 %
10 SYRINGE (ML) INJECTION PRN
Status: DISCONTINUED | OUTPATIENT
Start: 2018-11-16 | End: 2018-11-17 | Stop reason: HOSPADM

## 2018-11-16 RX ORDER — PSEUDOEPHEDRINE HCL 30 MG
100 TABLET ORAL 2 TIMES DAILY
Qty: 60 CAPSULE | Refills: 0 | Status: SHIPPED | OUTPATIENT
Start: 2018-11-16 | End: 2018-12-16

## 2018-11-16 RX ORDER — DIPHENHYDRAMINE HYDROCHLORIDE 50 MG/ML
12.5 INJECTION INTRAMUSCULAR; INTRAVENOUS
Status: DISCONTINUED | OUTPATIENT
Start: 2018-11-16 | End: 2018-11-16 | Stop reason: HOSPADM

## 2018-11-16 RX ORDER — FENTANYL CITRATE 50 UG/ML
INJECTION, SOLUTION INTRAMUSCULAR; INTRAVENOUS PRN
Status: DISCONTINUED | OUTPATIENT
Start: 2018-11-16 | End: 2018-11-16 | Stop reason: SDUPTHER

## 2018-11-16 RX ORDER — CEFAZOLIN SODIUM 2 G/50ML
2 SOLUTION INTRAVENOUS ONCE
Status: COMPLETED | OUTPATIENT
Start: 2018-11-16 | End: 2018-11-16

## 2018-11-16 RX ORDER — SODIUM CHLORIDE 0.9 % (FLUSH) 0.9 %
10 SYRINGE (ML) INJECTION EVERY 12 HOURS SCHEDULED
Status: DISCONTINUED | OUTPATIENT
Start: 2018-11-16 | End: 2018-11-16 | Stop reason: HOSPADM

## 2018-11-16 RX ORDER — FENTANYL CITRATE 50 UG/ML
50 INJECTION, SOLUTION INTRAMUSCULAR; INTRAVENOUS EVERY 10 MIN PRN
Status: DISCONTINUED | OUTPATIENT
Start: 2018-11-16 | End: 2018-11-16 | Stop reason: HOSPADM

## 2018-11-16 RX ORDER — ASPIRIN 81 MG/1
81 TABLET ORAL 2 TIMES DAILY
Status: DISCONTINUED | OUTPATIENT
Start: 2018-11-17 | End: 2018-11-17 | Stop reason: HOSPADM

## 2018-11-16 RX ORDER — ONDANSETRON 2 MG/ML
4 INJECTION INTRAMUSCULAR; INTRAVENOUS EVERY 6 HOURS PRN
Status: DISCONTINUED | OUTPATIENT
Start: 2018-11-16 | End: 2018-11-17 | Stop reason: HOSPADM

## 2018-11-16 RX ORDER — CEFAZOLIN SODIUM 2 G/50ML
2 SOLUTION INTRAVENOUS EVERY 8 HOURS
Status: COMPLETED | OUTPATIENT
Start: 2018-11-16 | End: 2018-11-16

## 2018-11-16 RX ORDER — MORPHINE SULFATE 4 MG/ML
4 INJECTION, SOLUTION INTRAMUSCULAR; INTRAVENOUS
Status: ACTIVE | OUTPATIENT
Start: 2018-11-16 | End: 2018-11-17

## 2018-11-16 RX ORDER — ROSUVASTATIN CALCIUM 5 MG/1
5 TABLET, COATED ORAL DAILY
Status: DISCONTINUED | OUTPATIENT
Start: 2018-11-16 | End: 2018-11-17 | Stop reason: HOSPADM

## 2018-11-16 RX ORDER — LISINOPRIL 10 MG/1
10 TABLET ORAL DAILY
Status: DISCONTINUED | OUTPATIENT
Start: 2018-11-17 | End: 2018-11-17 | Stop reason: HOSPADM

## 2018-11-16 RX ORDER — KETOROLAC TROMETHAMINE 30 MG/ML
INJECTION, SOLUTION INTRAMUSCULAR; INTRAVENOUS PRN
Status: DISCONTINUED | OUTPATIENT
Start: 2018-11-16 | End: 2018-11-16 | Stop reason: SDUPTHER

## 2018-11-16 RX ORDER — ONDANSETRON 2 MG/ML
INJECTION INTRAMUSCULAR; INTRAVENOUS PRN
Status: DISCONTINUED | OUTPATIENT
Start: 2018-11-16 | End: 2018-11-16 | Stop reason: SDUPTHER

## 2018-11-16 RX ORDER — ACETAMINOPHEN 325 MG/1
650 TABLET ORAL EVERY 6 HOURS
Status: DISCONTINUED | OUTPATIENT
Start: 2018-11-16 | End: 2018-11-17 | Stop reason: HOSPADM

## 2018-11-16 RX ORDER — HYDROCODONE BITARTRATE AND ACETAMINOPHEN 5; 325 MG/1; MG/1
2 TABLET ORAL PRN
Status: DISCONTINUED | OUTPATIENT
Start: 2018-11-16 | End: 2018-11-16 | Stop reason: HOSPADM

## 2018-11-16 RX ORDER — ACETAMINOPHEN 500 MG
1000 TABLET ORAL ONCE
Status: COMPLETED | OUTPATIENT
Start: 2018-11-16 | End: 2018-11-16

## 2018-11-16 RX ORDER — OXYCODONE HCL 10 MG/1
10 TABLET, FILM COATED, EXTENDED RELEASE ORAL ONCE
Status: COMPLETED | OUTPATIENT
Start: 2018-11-16 | End: 2018-11-16

## 2018-11-16 RX ORDER — KETOROLAC TROMETHAMINE 30 MG/ML
30 INJECTION, SOLUTION INTRAMUSCULAR; INTRAVENOUS EVERY 6 HOURS
Status: COMPLETED | OUTPATIENT
Start: 2018-11-16 | End: 2018-11-16

## 2018-11-16 RX ORDER — CYCLOBENZAPRINE HCL 10 MG
10 TABLET ORAL 3 TIMES DAILY PRN
Status: DISCONTINUED | OUTPATIENT
Start: 2018-11-16 | End: 2018-11-17 | Stop reason: HOSPADM

## 2018-11-16 RX ORDER — MEPERIDINE HYDROCHLORIDE 25 MG/ML
12.5 INJECTION INTRAMUSCULAR; INTRAVENOUS; SUBCUTANEOUS EVERY 5 MIN PRN
Status: DISCONTINUED | OUTPATIENT
Start: 2018-11-16 | End: 2018-11-16 | Stop reason: HOSPADM

## 2018-11-16 RX ORDER — CYCLOBENZAPRINE HCL 10 MG
5 TABLET ORAL 3 TIMES DAILY PRN
Qty: 30 TABLET | Refills: 0 | Status: SHIPPED | OUTPATIENT
Start: 2018-11-16 | End: 2018-11-26

## 2018-11-16 RX ORDER — SODIUM CHLORIDE 0.9 % (FLUSH) 0.9 %
10 SYRINGE (ML) INJECTION PRN
Status: DISCONTINUED | OUTPATIENT
Start: 2018-11-16 | End: 2018-11-16 | Stop reason: HOSPADM

## 2018-11-16 RX ORDER — ONDANSETRON 2 MG/ML
4 INJECTION INTRAMUSCULAR; INTRAVENOUS
Status: DISCONTINUED | OUTPATIENT
Start: 2018-11-16 | End: 2018-11-16 | Stop reason: HOSPADM

## 2018-11-16 RX ADMIN — ACETAMINOPHEN 1000 MG: 500 TABLET ORAL at 07:25

## 2018-11-16 RX ADMIN — KETOROLAC TROMETHAMINE 30 MG: 30 INJECTION, SOLUTION INTRAMUSCULAR at 11:16

## 2018-11-16 RX ADMIN — LIDOCAINE HYDROCHLORIDE 50 MG: 10 INJECTION, SOLUTION INFILTRATION; PERINEURAL at 08:03

## 2018-11-16 RX ADMIN — SODIUM CHLORIDE, POTASSIUM CHLORIDE, SODIUM LACTATE AND CALCIUM CHLORIDE: 600; 310; 30; 20 INJECTION, SOLUTION INTRAVENOUS at 07:09

## 2018-11-16 RX ADMIN — ONDANSETRON 4 MG: 2 INJECTION INTRAMUSCULAR; INTRAVENOUS at 08:30

## 2018-11-16 RX ADMIN — CITALOPRAM HYDROBROMIDE 10 MG: 10 TABLET ORAL at 15:20

## 2018-11-16 RX ADMIN — OXYCODONE HYDROCHLORIDE 10 MG: 10 TABLET, FILM COATED, EXTENDED RELEASE ORAL at 07:24

## 2018-11-16 RX ADMIN — MIDAZOLAM HYDROCHLORIDE 2 MG: 1 INJECTION, SOLUTION INTRAMUSCULAR; INTRAVENOUS at 07:57

## 2018-11-16 RX ADMIN — ROSUVASTATIN CALCIUM 5 MG: 5 TABLET, COATED ORAL at 21:00

## 2018-11-16 RX ADMIN — DEXAMETHASONE SODIUM PHOSPHATE 10 MG: 10 INJECTION INTRAMUSCULAR; INTRAVENOUS at 08:30

## 2018-11-16 RX ADMIN — ACETAMINOPHEN 650 MG: 325 TABLET ORAL at 11:16

## 2018-11-16 RX ADMIN — SODIUM CHLORIDE, POTASSIUM CHLORIDE, SODIUM LACTATE AND CALCIUM CHLORIDE: 600; 310; 30; 20 INJECTION, SOLUTION INTRAVENOUS at 09:45

## 2018-11-16 RX ADMIN — SODIUM CHLORIDE, POTASSIUM CHLORIDE, SODIUM LACTATE AND CALCIUM CHLORIDE: 600; 310; 30; 20 INJECTION, SOLUTION INTRAVENOUS at 06:47

## 2018-11-16 RX ADMIN — TRANEXAMIC ACID 1000 MG: 1 INJECTION, SOLUTION INTRAVENOUS at 10:00

## 2018-11-16 RX ADMIN — LIDOCAINE HYDROCHLORIDE 0.1 ML: 10 INJECTION, SOLUTION EPIDURAL; INFILTRATION; INTRACAUDAL; PERINEURAL at 07:09

## 2018-11-16 RX ADMIN — ACETAMINOPHEN 650 MG: 325 TABLET ORAL at 23:06

## 2018-11-16 RX ADMIN — SODIUM CHLORIDE 600 ML: 9 INJECTION, SOLUTION INTRAVENOUS at 15:20

## 2018-11-16 RX ADMIN — CELECOXIB 400 MG: 200 CAPSULE ORAL at 07:25

## 2018-11-16 RX ADMIN — FENTANYL CITRATE 100 MCG: 50 INJECTION, SOLUTION INTRAMUSCULAR; INTRAVENOUS at 07:57

## 2018-11-16 RX ADMIN — CEFAZOLIN SODIUM 2 G: 2 SOLUTION INTRAVENOUS at 23:07

## 2018-11-16 RX ADMIN — ACETAMINOPHEN 650 MG: 325 TABLET ORAL at 17:29

## 2018-11-16 RX ADMIN — KETOROLAC TROMETHAMINE 30 MG: 30 INJECTION, SOLUTION INTRAMUSCULAR at 17:29

## 2018-11-16 RX ADMIN — TRANEXAMIC ACID 1000 MG: 1 INJECTION, SOLUTION INTRAVENOUS at 08:00

## 2018-11-16 RX ADMIN — DOCUSATE SODIUM AND SENNOSIDES 1 TABLET: 50; 8.6 TABLET, FILM COATED ORAL at 21:01

## 2018-11-16 RX ADMIN — SODIUM CHLORIDE, POTASSIUM CHLORIDE, SODIUM LACTATE AND CALCIUM CHLORIDE: 600; 310; 30; 20 INJECTION, SOLUTION INTRAVENOUS at 09:10

## 2018-11-16 RX ADMIN — SODIUM CHLORIDE: 9 INJECTION, SOLUTION INTRAVENOUS at 21:01

## 2018-11-16 RX ADMIN — CEFAZOLIN SODIUM 2 G: 2 SOLUTION INTRAVENOUS at 15:20

## 2018-11-16 RX ADMIN — PROPOFOL 100 MCG/KG/MIN: 10 INJECTION, EMULSION INTRAVENOUS at 08:03

## 2018-11-16 RX ADMIN — KETOROLAC TROMETHAMINE 30 MG: 30 INJECTION, SOLUTION INTRAMUSCULAR at 08:30

## 2018-11-16 RX ADMIN — CEFAZOLIN SODIUM 2 G: 2 SOLUTION INTRAVENOUS at 07:57

## 2018-11-16 RX ADMIN — DOCUSATE SODIUM 100 MG: 100 CAPSULE, LIQUID FILLED ORAL at 21:00

## 2018-11-16 ASSESSMENT — PULMONARY FUNCTION TESTS
PIF_VALUE: 0
PIF_VALUE: 1
PIF_VALUE: 0
PIF_VALUE: 1
PIF_VALUE: 0
PIF_VALUE: 1
PIF_VALUE: 0
PIF_VALUE: 1
PIF_VALUE: 0
PIF_VALUE: 1
PIF_VALUE: 0

## 2018-11-16 ASSESSMENT — PAIN SCALES - GENERAL
PAINLEVEL_OUTOF10: 0
PAINLEVEL_OUTOF10: 1
PAINLEVEL_OUTOF10: 1
PAINLEVEL_OUTOF10: 6
PAINLEVEL_OUTOF10: 0

## 2018-11-16 ASSESSMENT — PAIN DESCRIPTION - DESCRIPTORS: DESCRIPTORS: ACHING

## 2018-11-16 ASSESSMENT — PAIN - FUNCTIONAL ASSESSMENT: PAIN_FUNCTIONAL_ASSESSMENT: 0-10

## 2018-11-16 NOTE — DISCHARGE INSTR - COC
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    Arthropathy of cervical facet joint M47.812    MUNA (obstructive sleep apnea) G47.33    Patient overweight E66.3    Onychomycosis B35.1    Pain in both feet M79.671, M79.672    Mixed hyperlipidemia E78.2    Essential hypertension I10    Status post hip replacement, right Z96.641    Degenerative joint disease of left hip M16.12    Constipation K59.00    Status post total hip replacement, left S65.748       Isolation/Infection:   Isolation          No Isolation            Nurse Assessment:  Last Vital Signs: /71   Pulse 56   Temp 98.1 °F (36.7 °C) (Temporal)   Resp 8   Ht 6' (1.829 m)   Wt 214 lb 12.8 oz (97.4 kg)   SpO2 96%   BMI 29.13 kg/m²     Last documented pain score (0-10 scale): Pain Level: 0  Last Weight:   Wt Readings from Last 1 Encounters:   11/16/18 214 lb 12.8 oz (97.4 kg)     Mental Status:  oriented, alert, coherent, logical, thought processes intact and able to concentrate and follow conversation    IV Access:  - None    Nursing Mobility/ADLs:  Walking   Assisted  Transfer  Assisted  Bathing  Assisted  Dressing  Assisted  Toileting  Assisted  Feeding  independent  Med Admin  Independent  Med Delivery   none and whole    Wound Care Documentation and Therapy:  Incision 08/03/18 Hip Right (Active)   Number of days: 105       Incision 11/16/18 Hip Left (Active)   Dressing Status Clean;Dry; Intact 11/16/2018 10:30 AM   Number of days: 0        Elimination:  Continence:   · Bowel: Yes  · Bladder: Yes  Urinary Catheter: None   Colostomy/Ileostomy/Ileal Conduit: No       Date of Last BM:     Intake/Output Summary (Last 24 hours) at 11/16/18 1120  Last data filed at 11/16/18 1030   Gross per 24 hour   Intake             2150 ml   Output              300 ml   Net             1850 ml     No intake/output data recorded. Safety Concerns:      At Risk for Falls    Impairments/Disabilities:      None    Nutrition

## 2018-11-16 NOTE — PROGRESS NOTES
Physical Therapy Med Surg Initial Assessment  Facility/Department: [facility]  Room: N221/N221-       NAME: Pippa Turcios  : 1950 (30 y.o.)  MRN: [id number]  CODE STATUS: Full Code    Date of Service: 2018    Patient Diagnosis(es): Status post total hip replacement, left [Z96.642]   No chief complaint on file.     Patient Active Problem List    Diagnosis Date Noted    Status post total hip replacement, left 2018    Degenerative joint disease of left hip 2018    Constipation 2018    Status post hip replacement, right 2018    Essential hypertension 2018    Mixed hyperlipidemia 2018    MUNA (obstructive sleep apnea) 2017    Patient overweight 2017    Onychomycosis 2017    Pain in both feet 2017    Scrotal wall abscess 2015    Arthropathy of cervical facet joint 2011    Cervical nerve root disorder 2011        Past Medical History:   Diagnosis Date    Arthritis     hips    Hyperlipidemia     meds > 15 yrs    Hypertension     meds > 10 yrs     Past Surgical History:   Procedure Laterality Date    BACK SURGERY      CATARACT REMOVAL Left 2017    CERVICAL FUSION  1998    COLONOSCOPY      COLONOSCOPY      EYE SURGERY      phaco with IOL OU    EYE SURGERY      left repair detached retina    EYE SURGERY      right laser for retinal tear    KIDNEY TRANSPLANT  2018    RTHR    LAMINECTOMY  1988    L4    FL TOTAL HIP ARTHROPLASTY Right 8/3/2018    RIGHT TOTAL HIP ARTHROPLASTY performed by Mary Moe MD at [address] [place] [address] squamous cell cancer    TONSILLECTOMY      as child       Chart Reviewed: Yes  Patient assessed for rehabilitation services?: Yes  Family / Caregiver Present: No  General Comment  Comments: Patient up in bed, agreeable to PT eval    Restrictions:  Restrictions/Precautions: Weight Bearing, Fall Risk  Lower Extremity Weight Bearing Restrictions  Left Lower Extremity Weight Bearing: Weight Bearing As Tolerated  Position Activity Restriction  Hip Precautions: Posterior hip precautions     SUBJECTIVE: Subjective: Patient reports he is now able to feel the LLE       Post Treatment Pain Screening:   Pain Screening  Patient Currently in Pain: No  Pain Assessment  Pain Assessment: 0-10  Pain Level: 0    Prior Level of Function:  Social/Functional History  Lives With: Spouse  Type of Home: House  Home Layout: Two level (9 stairs +3 with rail )  Home Access: Level entry  Home Equipment: Cane, Rolling walker  Receives Help From: Family  ADL Assistance: Independent  Homemaking Assistance: Independent  Homemaking Responsibilities: Yes  Ambulation Assistance: Independent  Transfer Assistance: Independent  Active : Yes    OBJECTIVE:   Vision/Hearing:  Vision: Within Functional Limits  Hearing: Within functional limits    Cognition:  Overall Orientation Status: Within Normal Limits  Follows Commands: Within Functional Limits         ROM:  RLE AROM: WFL  LLE AROM : WFL    Strength:  Strength RLE  Comment: observed at least 3+/5  Strength LLE  Comment: observed at least 3+/5    Neuro:  Balance  Posture: Good  Sitting - Static: Good  Sitting - Dynamic: Good  Standing - Static: Fair  Standing - Dynamic: Fair;-  Comments: patient reporting dizziness upon sitting in chair after ambulation. BP found to be 75/34, nursing and MD in room and notified, patient returned to supine position in bed and BP returned to WNL             Bed mobility  Supine to Sit: Stand by assistance  Sit to Supine: Moderate assistance  Comment: posterior precautions maintained throughout     Transfers  Sit to Stand: Moderate Assistance (+2)  Stand to sit: Moderate Assistance  Comment: cues for hand placement, retropulsive initially upon standing.  posterior precautions maintained throughout     Ambulation  Ambulation?: Yes  Ambulation 1  Surface: level tile  Device: Rolling [facility]: Moderate assistance;Minimal assistance  Quality of Gait: decreased WB LLE, leaning to L at times, decrease step height and length   Distance: 10 feet   Comments: posterior precautions maintained throughout   Pt instructed in anti-embolics while up in chair including ankle pumps, quad sets, and glute sets; educated with frequency and dosage of HEP and PT POC while in-house. Activity Tolerance  Activity Tolerance: Treatment limited secondary to medical complications (free text)          ASSESSMENT:   Body structures, Functions, Activity limitations: Decreased functional mobility ; Decreased ROM; Decreased strength;Decreased fine motor control;Decreased endurance;Decreased safe awareness;Decreased balance;Decreased coordination  Decision Making: Medium Complexity    Prognosis: Good  Patient Education: PT POC, safety in room, use of call light, WB and Posterior hip precautions     DISCHARGE RECOMMENDATIONS:  Discharge Recommendations: Continue to assess pending progress, Patient would benefit from continued therapy after discharge    Assessment: Pt demonstrates the above deficits and decline in functional mobility status placing them at increased risk for falls. Pt would benefit from physical therapy to address above deficits and allow for safe return home at highest level of function, decrease risk for falls, and improve QOL.     REQUIRES PT FOLLOW UP: Yes      PLAN OF CARE:  Plan  Times per week: BID  Current Treatment Recommendations: Strengthening, ROM, Balance Training, Functional Mobility Training, Stair training, Safety Education & Training, Gait Training, Home Exercise Program, Modalities, Pain Management, Equipment Evaluation, Education, & procurement, Transfer Training, Endurance Training, Neuromuscular Re-education, Patient/Caregiver Education & Training, Positioning  Safety Devices  Type of devices: Call light within reach, Bed alarm in place    G-Code:  PT G-Codes  Functional Assessment Tool Used: clinical judgement   Functional Limitation: Mobility: Walking and moving around  Mobility: Walking and Moving Around Current Status (): At least 40 percent but less than 60 percent impaired, limited or restricted  Mobility: Walking and Moving Around Goal Status ():  At least 1 percent but less than 20 percent impaired, limited or restricted    Goals:  Patient goals : Patient willing to participate in PT POC  Short term goals  Short term goal 1: Patient will be SBA in HEP  Short term goal 2: Patient will complete bed mobility SPV while maintaining posterior hip precautions throughout   Short term goal 3: Patient will sit<>stand min A while maintaining posterior hip precautions throughout    Short term goal 4: Patient will ambulate >50 feet with fww CGA while maintaining posterior hip precautions throughout   Long term goals  Long term goal 1: Patient will ascend/descend full flight of stairs with raill CGA while maintaining posterior hip precautions throughout     AMPAC (6 CLICK) [place] 28 Inpatient Mobility Raw Score : 15     Therapy Time:   Individual   Time In 1445   Time Out 1525   Minutes 40       transfers: 12 minutes    Jaida Pringle PT, 11/16/18 at 3:37 PM

## 2018-11-16 NOTE — ANESTHESIA PRE PROCEDURE
Department of Anesthesiology  Preprocedure Note       Name:  Nallely Hull   Age:  76 y.o.  :  1950                                          MRN:  [id number]         Date:  2018      Surgeon: Terrance Jarrett):  Alberta Butler MD    Procedure: LEFT TOTAL HIP ARTHROPLASTY, LATERAL DECUB, JOY 62 TM CUP 13 STEM EXTENDED OFFSET 36 HEAD O NECK (Left )    Medications prior to admission:   Prior to Admission medications    Medication Sig Start Date End Date Taking?  Authorizing Provider   Respiratory Therapy Supplies SHELIA New CPAP mask and supplies 10/23/18  Yes Alisha Short MD   metoprolol succinate (TOPROL XL) 50 MG extended release tablet Take 1 tablet by mouth daily 18  Yes Kvng Mustafa MD   CPAP Machine MISC by Does not apply route   Yes Historical Provider, MD   lisinopril (PRINIVIL;ZESTRIL) 10 MG tablet Take 1 tablet by mouth daily 18  Yes Kvng Mustafa MD   citalopram (CELEXA) 10 MG tablet Take 1 tablet by mouth daily 18  Yes Kvng Mustafa MD   amLODIPine (NORVASC) 5 MG tablet Take 1 tablet by mouth daily 18  Yes Kvng Mustafa MD   rosuvastatin (CRESTOR) 5 MG tablet Take 1 tablet by mouth daily 18  Yes Kvng Mustafa MD   aspirin 81 MG EC tablet Take 1 tablet by mouth 2 times daily 8/3/18 11/1/18  Skip Mikayla APRN - CNP       Current medications:    Current Facility-Administered Medications   Medication Dose Route Frequency Provider Last Rate Last Dose    tranexamic acid (CYKLOKAPRON) 1,000 mg in sodium chloride 0.9 % 100 mL IVPB  1,000 mg Intravenous On Call to [address] [facility] [address], APRN - CNP        tranexamic acid (CYKLOKAPRON) 1,000 mg in sodium chloride 0.9 % 50 mL IVPB  1,000 mg Intravenous On Call to [address] [facility] [address], APRN - CNP        lactated ringers infusion   Intravenous Continuous Maureen Mccloud APRN -  mL/hr at 18 0709      sodium chloride flush 0.9 % injection 10 mL  10 mL Intravenous 2 times per day Sparkle Elias opening: > = 3 FB Dental:    (+) partials      Pulmonary: breath sounds clear to auscultation  (+) sleep apnea:                             Cardiovascular:  Exercise tolerance: good (>4 METS),   (+) hypertension:, hyperlipidemia      ECG reviewed  Rhythm: regular             Beta Blocker:  Dose within 24 Hrs         Neuro/Psych:   (+) neuromuscular disease:,             GI/Hepatic/Renal: Neg GI/Hepatic/Renal ROS            Endo/Other: Negative Endo/Other ROS                    Abdominal:           Vascular: negative vascular ROS. Anesthesia Plan      spinal and MAC     ASA 2       Induction: intravenous. MIPS: Postoperative opioids intended and Prophylactic antiemetics administered. Anesthetic plan and risks discussed with patient. Use of blood products discussed with patient whom consented to blood products. Plan discussed with CRNA.     Attending anesthesiologist reviewed and agrees with Pre Eval content              Mechelle Mccloud MD   11/16/2018

## 2018-11-16 NOTE — PROGRESS NOTES
[facility] OCCUPATIONAL THERAPY EVALUATION - ACUTE     Date: 2018  Patient Name: Joao Lockwood        MRN: [id number]  Account: [de-identified]   : 1950  (76 y.o.)  Room: [place] [address]    Chart Review:  Diagnosis:  The encounter diagnosis was Status post total hip replacement, left. Past Medical History:   Diagnosis Date    Arthritis     hips    Hyperlipidemia     meds > 15 yrs    Hypertension     meds > 10 yrs     Past Surgical History:   Procedure Laterality Date    BACK SURGERY      CATARACT REMOVAL Left 2017    CERVICAL FUSION      COLONOSCOPY      COLONOSCOPY      EYE SURGERY      phaco with IOL OU    EYE SURGERY      left repair detached retina    EYE SURGERY      right laser for retinal tear    KIDNEY TRANSPLANT  2018    RTHR    LAMINECTOMY  1988    L4    MS TOTAL HIP ARTHROPLASTY Right 8/3/2018    RIGHT TOTAL HIP ARTHROPLASTY performed by Fanny Hussein MD at [address] [place] [address] squamous cell cancer    TONSILLECTOMY      as child     Precautions:  Fall,IV,WBAT,Posterior hip precautions  Restrictions/Precautions: Weight Bearing, Fall Risk  Hip Precautions: Posterior hip precautions  Left Lower Extremity Weight Bearing: Weight Bearing As Tolerated    Evaluation and Pt. rights have been reviewed: [x]Yes   [] No   If no why not:   Falls safety interventions in place  [x]Yes   [] No    Comments:     Subjective: \"I had my other one done a few months ago. \"    Prior living arrangement:      Support contact: Spouse    Pt lives: [] Alone   [x] With spouse   [] Other   Comment:    Home: [] Single level   [x]  Two level   []  Split level     []  Apartment:     Entrance:  Stairs: 0 Hand rails  ,    Inside: Stairs: 9+4 Hand rails: 1  Bathroom: [] Bath tub   [x] Tub/Shower combo   []  Shower stall    Location:      DME: [x] W/W   [] Devora Awkward   [] Rollator   []  W/C   [] [place]   [] Shower Chair   [] [facility]  [] Dressing  AE  [] Other:      Previous Functional Status:  Pt reports that he was independent with adl self care and homemaking tasks    Pain:   Start of session: 0/10  Description:    Location:    End of session: 0/10  Action: [x] No Action Necessary    [] Patient reports pain at acceptable level for treatment  [] Nursing notified    [] Other      Objective:  Observation:  Pt supine in bed. Pt with BP 75/34 when seated in bed side chair. Pt returned to bed. Orientation: Oriented to  [x] Person   [x] Place  [x]Time    Vision:   [x]  WFL   [] Impaired  Comments:      Hearing:  [x] WFL   [] Impaired  Comments:    Sensation:   [x] WFL   []  Impaired   Comments:      Cognition:   [x] WFL   [] Impaired  Comments:    Communication:   [x] WFL   [] Impaired  Comments:    Hand Dominance: [x] Right   [] Left    Range of Motion:  R UE AROM/PROM: [x]  WFL [] Impaired  Comments:   L UE AROM/PROM:  [x]  WFL [] Impaired  Comments:     Strength:   R UE Strength: []1    [] 2   [] 2+   [] 3   [] 3+   [x] 4   [] 4+  [] 5  Comments:   L UE Strength:  []1    [] 2   [] 2+   [] 3   [] 3+  [x] 4   [] 4+   [] 5  Comments:     Quality of Movement:  [x] Good   [] Fair   [] Poor     Coordination:  Gross motor: [x] WFL   [] Impaired   Fine motor: [x] WFL   [] Impaired     Functional Mobility:  Toilet Transfers: Mod A  Bed Transfer: Mod A  Sit to stand: Mod A  Bed to Chair:  MOd A    Seated Balance:      Static: [x] Good  [] Fair   [] Poor   Dynamic: []  Good  [x] Fair   [] Poor     Standing Balance:     Static: [] Good   [x] Fair-  [] Poor   Dynamic: [] Good   [x] Fair-  [] Poor     Functional Endurance: [] Good  [x] Fair - [] Poor     ADLs  Feeding:   Set up  UE Dressing:  SBA  LB Dressing: Mod A  Bathing: Mod A  Toileting:  Mod A  Grooming: Set up    Treatment Plan will consist of:   [] ADL Training   [x] Strengthening   [] Endurance   [x] Transfer Training   [x]  DME ed      [] HEP  [] Manual Therapy   [] AROM/PROM    [] Coordination   [] Cognitive Training   []Safety training   [] Other :    Goals:   Patient will:   [x]  Improve functional endurance to tolerate/complete 15-25 mins of ADL's   [x]  Be independent in UB ADLs    [x]  Be Mod I in LB ADLs   [x]  Be Independent in ADL transfers without LOB   [x]  Be independent in toileting tasks   []  Improve   hand fine motor coordination to   in order to manage clothing fasteners/self-care containers in a timely manner   []  Improve   UE Function (AROM, strength, motor control, tone normalization) to complete ADLs as projected. []  Improve bilateral UE strength and endurance to Good- in order to participate in self-care activities as projected. [x]  Access appropriate D/C site with as few architectural barriers as possible.   []  Sequence self-care tasks with     []  Other :       Patient Goal: To return to home  Discussed and agreed upon: [x] Yes   [] No         Comments:     Assessment/Discharge Disposition:     Performance deficits / Impairments: Decreased functional mobility , Decreased ADL status, Decreased strength, Decreased endurance, Decreased high-level IADLs, Decreased balance  Prognosis: Good  Discharge Recommendations: Continue to assess pending progress  History: 3 complexities  Exam: 4 deficits  Assistance / Modification:  Mod A    Prognosis:  [x] Good   []Fair   [] Poor     Barriers to Improvement:  TOlerance,balance,strength    Six Click Score  How much help for putting on and taking off regular lower body clothing?: A Lot  How much help for Bathing?: A Lot  How much help for Toileting?: A Little  How much help for putting on and taking off regular upper body clothing?: None  How much help for taking care of personal grooming?: None  How much help for eating meals?: None  AM-PAC Inpatient Daily Activity Raw Score: 19  AM-PAC Inpatient ADL T-Scale Score : 40.22  ADL Inpatient CMS 0-100% Score: 42.8    Recommended DME:  [] W/W   [] Tonia Muller   [] Rollator   [] W/C   [] Flavia Spearsper  [] Shower Chair   []Dressing AD []  [facility]

## 2018-11-16 NOTE — OP NOTE
[facility] [place] [address] [place] [address], [id number] [facility]                                OPERATIVE REPORT    PATIENT NAME: Ban Alaniz                   :        1950  MED REC NO:   [id number]                            ROOM:  ACCOUNT NO:   [de-identified]                           ADMIT DATE: 2018  PROVIDER:     Josiah Lenz MD    DATE OF PROCEDURE:  2018    PREOPERATIVE DIAGNOSIS:  Left hip osteoarthritis. POSTOPERATIVE DIAGNOSIS:  Left hip osteoarthritis. OPERATION PERFORMED:  Left total hip replacement. SURGEON:  Josiah Lenz M.D.    ASSISTANT:  Hamilton Wong PA-C was present throughout the entire case. Given the nature of the disease process and the procedure, a skilled  surgical first assistant was necessary during the case. The assistant  was necessary to hold retractors and manipulate the extremity during the  procedure. A certified scrub tech was at the back table managing the  instruments and supplies for the surgical case. BLOOD LOSS:  200. FLUIDS:  Less than 2 L. ANESTHESIA:  Spinal.    COMPLICATIONS:  None. IMPLANT SIZE:  We reamed pelvis to 58 and place a 60 trabecular metal  Continuum porous cup. We used 130-mm screw. We used a liner and 60 cup  with a 15-degree elevated lip for a 36 head. We broached to a 13 and  placed a 13 trabecular metal porous stem with an extended offset and  neck. We used a 36 head with a 3.5-mm neck length. OPERATION AND FINDINGS:  The patient was taken to the operating room and  placed in a supine position whereupon adequate anesthesia was obtained. Surgical time-out was performed. Antibiotics were given. The patient  was placed in the lateral decubitus position and the hip was prepped and  draped in the usual sterile manner. A standard posterolateral approach  to the hip was made, electrocauterization was used for hemostasis.   The  IT band was split in line with its fibers. Anterior and posterior  retractors were inserted. The hip was maximally internally rotated and  short external rotators and piriformis tendon were taken down in one  layer from the piriformis fossa using the Bovie electrocautery device. The capsule was teed in line with the piriformis tendon and then the hip  was dislocated. The femoral neck resection was made 1 cm above the  lesser trochanter. There were extensive erosive changes of the femoral  head. The anterior and posterior acetabular retractors were then  inserted. The labrum was sharply excised using the knife. The  acetabulum was curetted to remove any soft tissue remaining and  sequential reaming was performed. Once final reaming was complete, this  had excellent circumferential fit and good bleeding bone. A porous  acetabular shell was then inserted after irrigating the acetabulum with  a copious amount of pulsating irrigation. The shell was completely  seated, it was checked and it was stable. It was placed in about 45  degrees of abduction and about 10 to 15 degrees of anteversion. Once  this was completed, femoral preparation was begun. A femoral neck retractor was inserted. A Charnley awl was inserted down  the femoral canal and lateralizing reaming was performed. Sequential  broaching was performed. With the final broach in position, it had  excellent fit and fill. It was placed at approximately 10 to 15 degrees  of anteversion. With the broach completely seated, calcar planing was  performed and a trial reduction was performed. The hip came to full  extension and was stable. The leg lengths were assessed and felt to be  appropriate. The dislocation was performed and trial femoral components were removed. The final femoral head was inserted and completely seated. The femoral  head and liner were applied. The hip was again reduced. Stability was  assessed and found to be satisfactory.     The joint capsule was then irrigated with a copious amount of saline  irrigation. The wound was closed using #2 Ethibond sutures through  drill holes in the piriformis fossa for short external rotators and  joint capsule. The IT band was closed using running locked #0 Vicryl  suture, #000 Monocryl was used to close the underlying subcutaneous  tissues and #4-0 Monocryl was used for the skin. A dry, sterile bulky  dressing was applied. The patient tolerated the procedure well and was  taken to the post-anesthesia care unit in satisfactory condition. A postoperative x-ray was reviewed and demonstrated satisfactory  alignment without fracture. The surgical findings and patient condition were discussed with the  patient's family.         Marcel Louis MD    D: 11/16/2018 9:31:13       T: 11/16/2018 10:26:43     [email or web address]  Job#: [id number]     Doc#: [id number]    CC:

## 2018-11-17 VITALS
WEIGHT: 214.8 LBS | SYSTOLIC BLOOD PRESSURE: 143 MMHG | DIASTOLIC BLOOD PRESSURE: 71 MMHG | HEIGHT: 72 IN | BODY MASS INDEX: 29.09 KG/M2 | OXYGEN SATURATION: 99 % | TEMPERATURE: 97.9 F | RESPIRATION RATE: 18 BRPM | HEART RATE: 76 BPM

## 2018-11-17 LAB
ANION GAP SERPL CALCULATED.3IONS-SCNC: 12 MEQ/L (ref 7–13)
BUN BLDV-MCNC: 11 MG/DL (ref 8–23)
CALCIUM SERPL-MCNC: 9.3 MG/DL (ref 8.6–10.2)
CHLORIDE BLD-SCNC: 96 MEQ/L (ref 98–107)
CO2: 23 MEQ/L (ref 22–29)
CREAT SERPL-MCNC: 0.61 MG/DL (ref 0.7–1.2)
GFR AFRICAN AMERICAN: >60
GFR NON-AFRICAN AMERICAN: >60
GLUCOSE BLD-MCNC: 137 MG/DL (ref 74–109)
HCT VFR BLD CALC: 34.9 % (ref 42–52)
HEMOGLOBIN: 12 G/DL (ref 14–18)
MCH RBC QN AUTO: 30.5 PG (ref 27–31.3)
MCHC RBC AUTO-ENTMCNC: 34.3 % (ref 33–37)
MCV RBC AUTO: 89 FL (ref 80–100)
PDW BLD-RTO: 13.3 % (ref 11.5–14.5)
PLATELET # BLD: 147 K/UL (ref 130–400)
POTASSIUM REFLEX MAGNESIUM: 4.3 MEQ/L (ref 3.5–5.1)
RBC # BLD: 3.92 M/UL (ref 4.7–6.1)
SODIUM BLD-SCNC: 131 MEQ/L (ref 132–144)
WBC # BLD: 8.9 K/UL (ref 4.8–10.8)

## 2018-11-17 PROCEDURE — 36415 COLL VENOUS BLD VENIPUNCTURE: CPT

## 2018-11-17 PROCEDURE — 97116 GAIT TRAINING THERAPY: CPT

## 2018-11-17 PROCEDURE — 97535 SELF CARE MNGMENT TRAINING: CPT

## 2018-11-17 PROCEDURE — 6370000000 HC RX 637 (ALT 250 FOR IP): Performed by: NURSE PRACTITIONER

## 2018-11-17 PROCEDURE — 85027 COMPLETE CBC AUTOMATED: CPT

## 2018-11-17 PROCEDURE — 6370000000 HC RX 637 (ALT 250 FOR IP): Performed by: INTERNAL MEDICINE

## 2018-11-17 PROCEDURE — 80048 BASIC METABOLIC PNL TOTAL CA: CPT

## 2018-11-17 RX ADMIN — DOCUSATE SODIUM AND SENNOSIDES 1 TABLET: 50; 8.6 TABLET, FILM COATED ORAL at 09:57

## 2018-11-17 RX ADMIN — METOPROLOL SUCCINATE 50 MG: 50 TABLET, EXTENDED RELEASE ORAL at 09:57

## 2018-11-17 RX ADMIN — LISINOPRIL 10 MG: 10 TABLET ORAL at 09:57

## 2018-11-17 RX ADMIN — CITALOPRAM HYDROBROMIDE 10 MG: 10 TABLET ORAL at 09:57

## 2018-11-17 RX ADMIN — ACETAMINOPHEN 650 MG: 325 TABLET ORAL at 11:27

## 2018-11-17 RX ADMIN — AMLODIPINE BESYLATE 5 MG: 5 TABLET ORAL at 09:57

## 2018-11-17 RX ADMIN — ASPIRIN 81 MG: 81 TABLET ORAL at 09:57

## 2018-11-17 RX ADMIN — DOCUSATE SODIUM 100 MG: 100 CAPSULE, LIQUID FILLED ORAL at 09:57

## 2018-11-17 RX ADMIN — ACETAMINOPHEN 650 MG: 325 TABLET ORAL at 05:36

## 2018-11-17 RX ADMIN — CYCLOBENZAPRINE HYDROCHLORIDE 10 MG: 10 TABLET, FILM COATED ORAL at 03:32

## 2018-11-17 ASSESSMENT — PAIN SCALES - GENERAL
PAINLEVEL_OUTOF10: 2
PAINLEVEL_OUTOF10: 2
PAINLEVEL_OUTOF10: 3
PAINLEVEL_OUTOF10: 2

## 2018-11-17 ASSESSMENT — PAIN DESCRIPTION - ORIENTATION
ORIENTATION: LEFT
ORIENTATION: LEFT

## 2018-11-17 ASSESSMENT — PAIN DESCRIPTION - PAIN TYPE
TYPE: ACUTE PAIN
TYPE: ACUTE PAIN

## 2018-11-17 ASSESSMENT — PAIN DESCRIPTION - LOCATION
LOCATION: HIP
LOCATION: HIP

## 2018-11-17 NOTE — PROGRESS NOTES
Strengthening   [x] Transfer Training    [] DME Education  [] HEP   [] Manual Therapy   [x] Patient Education  [] Other:      Assessment: Pt slightly impulsive. Pt demonstrates decreased safety awareness. Pt tolerated treatment well.      SixClick    AM-PAC Daily Activity Inpatient   How much help for putting on and taking off regular lower body clothing?: A Little  How much help for Bathing?: A Little  How much help for Toileting?: A Little  How much help for putting on and taking off regular upper body clothing?: None  How much help for taking care of personal grooming?: None  How much help for eating meals?: None  AM-[facility] Inpatient Daily Activity Raw Score: 21  AM-PAC Inpatient ADL T-Scale Score : 44.27  ADL Inpatient CMS 0-100% Score: 32.79  ADL Inpatient CMS G-Code Modifier : CJ    Plan:  [x] Continue OT per POC  [] D/C OT  [] Other:     Goals/Plan of care addressed during this session:   [] Improve balance  [] Improve Strength   [x] Improve Scurry with functional transfers   [x]  Improve Scurry with ADLs     Time In: 10:10  Time Out[de-identified] 10:25     Electronically signed by:    MABEL Cook    11/17/2018, 10:28 AM

## 2018-11-17 NOTE — PROGRESS NOTES
discharge    Goals:  Short term goals  Short term goal 1: Patient will be SBA in HEP  Short term goal 2: Patient will complete bed mobility SPV while maintaining posterior hip precautions throughout   Short term goal 3: Patient will sit<>stand min A while maintaining posterior hip precautions throughout    Short term goal 4: Patient will ambulate >50 feet with fww CGA while maintaining posterior hip precautions throughout   Long term goals  Long term goal 1: Patient will ascend/descend full flight of stairs with raill CGA while maintaining posterior hip precautions throughout   Patient Goals   Patient goals : Patient willing to participate in PT POC    PLAN:   Plan  Times per week: BID  Current Treatment Recommendations: Strengthening, ROM, Balance Training, Functional Mobility Training, Stair training, Safety Education & Training, Gait Training, Home Exercise Program, Modalities, Pain Management, Equipment Evaluation, Education, & procurement, Transfer Training, Endurance Training, Neuromuscular Re-education, Patient/Caregiver Education & Training, Positioning  Safety Devices  Type of devices:  All fall risk precautions in place, Call light within reach, Chair alarm in place, Left in chair     RACHELLC (6 CLICK) Alyx 95 Raw Score : 18      Therapy Time   Individual   Time In 0802   Time Out 0831   Minutes 29      Gait: 17  Bm/Trsf: 8   Therex: [address], PTA, 11/17/18 at 8:37 AM

## 2018-11-17 NOTE — PROGRESS NOTES
hours.    Urinalysis:    Lab Results   Component Value Date    NITRU Negative 11/01/2018    WBCUA 0-2 11/01/2014    BACTERIA Moderate 11/01/2014    RBCUA >100 11/01/2014    BLOODU Negative 11/01/2018    SPECGRAV 1.015 11/01/2018    GLUCOSEU Negative 11/01/2018       Radiology:  XR HIP LEFT (1 VIEW)   Final Result   STATUS POST LEFT TOTAL HIP ARTHROPLASTY               Assessment/Plan:    77 y/o man with history of OA of the hips, HTN,HLP,MUNA on CPAP who underwent elective left PACO and admitted to orthopedic service, our service was consulted for co-management of his chronic medical conditions.     Left hip OA s/p PACO  - POD 1  - management per primary service     Orthostatic hypotension  - resolved     HTN  - controlled  - resume home meds      HLP  - continue statin therapy     MUNA   - continue  CPAP at [facility]                 Electronically signed by Italia Rodríguez MD on 11/17/2018 at 9:18 AM

## 2018-11-17 NOTE — CARE COORDINATION
PATIENT FOR DISCHARGE TODAY. PATIENT TO RESUME SERVICES WITH [facility]. FREEDOM OF CHOICE PROVIDED. CALL PLACED TO [place] NO ANSWER. [facility] LOCAL CALLED AND PAGE OUT TO ANDRES STRATTON. AWAITING CALL BACK. ORDERS ON CHART. PATIENT HAS ALL EQUIPMENT. C3 TO FOLLOW. [facility] RETURNED PHONE CALL. RESUMPTION FOR [facility] NOTIFIED.  Electronically signed by Nicole Otero RN on 11/17/2018 at 12:48 PM

## 2018-11-17 NOTE — PROGRESS NOTES
Physical Therapy Med Surg Daily Treatment Note  Facility/Department: [facility]  Room: N221/N221-       NAME: Pippa Turcios  : 1950 (51 y.o.)  MRN: [id number]  CODE STATUS: Full Code    Date of Service: 2018    Patient Diagnosis(es): Status post total hip replacement, left [Z96.642]   No chief complaint on file.     Patient Active Problem List    Diagnosis Date Noted    Status post total hip replacement, left 2018    Degenerative joint disease of left hip 2018    Constipation 2018    Status post hip replacement, right 2018    Essential hypertension 2018    Mixed hyperlipidemia 2018    MUNA (obstructive sleep apnea) 2017    Patient overweight 2017    Onychomycosis 2017    Pain in both feet 2017    Scrotal wall abscess 2015    Arthropathy of cervical facet joint 2011    Cervical nerve root disorder 2011        Past Medical History:   Diagnosis Date    Arthritis     hips    Hyperlipidemia     meds > 15 yrs    Hypertension     meds > 10 yrs     Past Surgical History:   Procedure Laterality Date    BACK SURGERY      CATARACT REMOVAL Left 2017    CERVICAL FUSION  1998    COLONOSCOPY      COLONOSCOPY      EYE SURGERY      phaco with IOL OU    EYE SURGERY      left repair detached retina    EYE SURGERY      right laser for retinal tear    KIDNEY TRANSPLANT  2018    RTHR    LAMINECTOMY  1988    L4    [place] TOTAL HIP ARTHROPLASTY Right 8/3/2018    RIGHT TOTAL HIP ARTHROPLASTY performed by Mary Moe MD at [address] [place] [address] squamous cell cancer    TONSILLECTOMY      as child          Restrictions:  Restrictions/Precautions: Weight Bearing, Fall Risk  Lower Extremity Weight Bearing Restrictions  Left Lower Extremity Weight Bearing: Weight Bearing As Tolerated  Position Activity Restriction  Hip Precautions: Posterior hip precautions    SUBJECTIVE:  General  Chart Reviewed: Yes  Family / Caregiver Present: No  Subjective  Subjective: I'm ready to go home and get out of here. General Comment  Comments: Pt. able to recite all 3 hip precautions without cues. Pre Pain Assessment:  Pre Treatment Pain Screening  Pain at present: 2  Pain Screening  Patient Currently in Pain: Yes       Post Pain Assessment:   Pain Assessment  Pain Assessment: 0-10  Pain Level: 2  Pain Type: Acute pain  Pain Location: Hip  Pain Orientation: Left       OBJECTIVE:         Bed mobility  Sit to Supine: Modified independent  Comment: bed flat rails used hip precautions maintained. Transfers  Sit to Stand: Stand by assistance  Stand to sit: Stand by assistance  Car Transfer: Stand by assistance  Comment: vc's for hand placement. Good carryover noted. Ambulation  Ambulation?: Yes  Ambulation 1  Surface: level tile  Device: Rolling Walker  Assistance: Stand by assistance  Quality of Gait: decreased WB LLE, leaning to L at times, decrease step height and length reciprocal gait pattern. Distance: 28'  Comments: posterior precautions maintained throughout distance limited by destination. Stairs/Curb  Stairs?: Yes  Stairs  # Steps : 12  Stairs Height: 6\"  Rails: Right ascending  Device: Single pt cane  Assistance: Stand by assistance  Comment: Pt. stating he has been using 1 rail 1 cane before surgery, demonstrates safe technique. Exercises  Comments: pt. stating he remembers his HEP from when he had his R hip done. Reviewed written HEP pt. stating understanding of dosage and frequency. ASSESSMENT:  Body structures, Functions, Activity limitations: Decreased functional mobility ; Decreased ROM; Decreased strength;Decreased fine motor control;Decreased endurance;Decreased safe awareness;Decreased balance;Decreased coordination    Assessment: Pt. maintains hip precautions well, safe technique on stairs and car.      Activity

## 2018-11-19 NOTE — PROGRESS NOTES
Physical Therapy  Facility/Department: [facility] MED SURG N221/N221-01  Physical Therapy Discharge      NAME: Carmela Pérez    : 1950 (76 y.o.)  MRN: [id number]    Account: [de-identified]  Gender: male      Patient has been discharged from [facility] hospital. DC patient from current PT program.      Electronically signed by Jac Hernandez PT on 18 at 12:20 PM

## 2018-11-20 ENCOUNTER — TELEPHONE (OUTPATIENT)
Dept: FAMILY MEDICINE CLINIC | Age: 68
End: 2018-11-20

## 2018-12-03 ENCOUNTER — HOSPITAL ENCOUNTER (OUTPATIENT)
Dept: PHYSICAL THERAPY | Age: 68
Setting detail: THERAPIES SERIES
Discharge: HOME | End: 2018-12-03
Payer: MEDICARE

## 2018-12-03 PROCEDURE — G8978 MOBILITY CURRENT STATUS: HCPCS

## 2018-12-03 PROCEDURE — 97162 PT EVAL MOD COMPLEX 30 MIN: CPT

## 2018-12-03 PROCEDURE — G8979 MOBILITY GOAL STATUS: HCPCS

## 2018-12-03 ASSESSMENT — PAIN DESCRIPTION - ORIENTATION: ORIENTATION: LEFT

## 2018-12-03 ASSESSMENT — PAIN DESCRIPTION - FREQUENCY: FREQUENCY: INTERMITTENT

## 2018-12-03 ASSESSMENT — PAIN DESCRIPTION - LOCATION: LOCATION: HIP

## 2018-12-03 ASSESSMENT — PAIN SCALES - GENERAL: PAINLEVEL_OUTOF10: 1

## 2018-12-03 ASSESSMENT — PAIN DESCRIPTION - DESCRIPTORS: DESCRIPTORS: ACHING;SORE

## 2018-12-03 ASSESSMENT — PAIN DESCRIPTION - PAIN TYPE: TYPE: SURGICAL PAIN

## 2018-12-03 NOTE — PROGRESS NOTES
Precautions: B THR precautions              ? Patient Status:[x] Continue/ Initiate plan of Care    [] Discharge PT. Recommend pt continue with HEP. [] Additional visits requested, Please re-certify for additional visits:        Signature: Electronically signed by Sebastian Cortes PT on 12/3/18 at 8:53 AM    If you have any questions or concerns, please don't hesitate to call. Thank you for your referral.    I have reviewed this plan of care and certify a need for medically necessary rehabilitation services.     Physician Signature:__________________________________________________________  Date:  Please sign and return

## 2018-12-03 NOTE — PROGRESS NOTES
ascending  Device: Single pt cane  Assistance: Modified independent   Comment: non reciprocal     Additional Measures  Special Tests: NA d/t recent surgery      Exercises:   Exercises  Exercise 1: SF/NS (within hip precautions)*  Exercise 2: sink ex (to ensure proper technique for hep)*  Exercise 3: step ups*  Exercise 4: hip hikes*  Exercise 5: single steps F/L*   Exercise 6: SLR*  Exercise 7: bridges*  Exercise 8: S/L hip abd*  Exercise 9: mod ifrah stretch*  Exercise 10: HS curl with pball*   Exercise 20: HEP: cont current from [place] [id number]   Modalities:  Modalities  Cryotherapy (Minutes\Location): PRN  Manual:  Manual therapy  PROM: L hip (within hip precuations)*  *Indicates exercise,modality, or manual techniques to be initiated when appropriate  Assessment: Body structures, Functions, Activity limitations: Decreased functional mobility , Decreased ROM, Decreased strength, Decreased endurance, Decreased balance  Assessment: Pt presents s/p L THR 11/16/18 with decreased L hip arom, decreased postural awareness, decreased B Hip strength, decreased fucntional mobility, and p/o pain. These impairments currently limit his fucntional abilities to perofrm transfers, ambulate, perofrm stairs, ADL's, and functional activities without increased pain or limitations.    Prognosis: Good  Discharge Recommendations: Continue to assess pending progress  Activity Tolerance: Patient Tolerated treatment well     History: high  Exam: high  Clinical Presentation: med       Plan  Frequency/Duration:  Plan  Times per week: 2  Plan weeks: 5  Current Treatment Recommendations: Strengthening, ROM, Balance Training, Functional Mobility Training, Transfer Training, Gait Training, Stair training, Neuromuscular Re-education, Manual Therapy - Soft Tissue Mobilization, Home Exercise Program, Patient/Caregiver Education & Training, Equipment Evaluation, Education, & procurement, Modalities, Aquatics, Safety Education & Training     G-Codes  PT

## 2018-12-07 ENCOUNTER — HOSPITAL ENCOUNTER (OUTPATIENT)
Dept: PHYSICAL THERAPY | Age: 68
Setting detail: THERAPIES SERIES
Discharge: HOME | End: 2018-12-07
Payer: MEDICARE

## 2018-12-07 PROCEDURE — 97110 THERAPEUTIC EXERCISES: CPT

## 2018-12-07 ASSESSMENT — PAIN DESCRIPTION - ORIENTATION: ORIENTATION: LEFT

## 2018-12-07 ASSESSMENT — PAIN SCALES - GENERAL: PAINLEVEL_OUTOF10: 4

## 2018-12-07 ASSESSMENT — PAIN DESCRIPTION - DESCRIPTORS: DESCRIPTORS: ACHING

## 2018-12-07 ASSESSMENT — PAIN DESCRIPTION - LOCATION: LOCATION: HIP

## 2018-12-10 ENCOUNTER — HOSPITAL ENCOUNTER (OUTPATIENT)
Dept: PHYSICAL THERAPY | Age: 68
Setting detail: THERAPIES SERIES
Discharge: HOME | End: 2018-12-10
Payer: MEDICARE

## 2018-12-10 PROCEDURE — 97110 THERAPEUTIC EXERCISES: CPT

## 2018-12-10 ASSESSMENT — PAIN SCALES - GENERAL: PAINLEVEL_OUTOF10: 4

## 2018-12-10 ASSESSMENT — PAIN DESCRIPTION - PAIN TYPE: TYPE: SURGICAL PAIN

## 2018-12-10 ASSESSMENT — PAIN DESCRIPTION - LOCATION: LOCATION: HIP

## 2018-12-13 ENCOUNTER — HOSPITAL ENCOUNTER (OUTPATIENT)
Dept: PHYSICAL THERAPY | Age: 68
Setting detail: THERAPIES SERIES
Discharge: HOME | End: 2018-12-13
Payer: MEDICARE

## 2018-12-13 PROCEDURE — 97110 THERAPEUTIC EXERCISES: CPT

## 2018-12-13 NOTE — PROGRESS NOTES
decreased B Hip strength, decreased fucntional mobility, and p/o L hip pain    Goals:  Long term goals  Time Frame for Long term goals : 5 weeks  Long term goal 1: Improve L hip AROM flex 90* abd 45*,  and B hip ext (in standing) lacking 5* from neutral to increase ease with mobility. Long term goal 2: Improve julio hip strength to >/= 5/5 to improve stability with ambulation. Long term goal 3: The patient will ambulate unlimited distances all surfaces independently without AD in order to safely ambulate in the community at [address] term goal 4: The pt will be indep/compliant with HEP in order to self manage symptoms upon D/C  Long term goal 5: The pt will perform 12-6\"stairs inep/reciprocally to perform indep at home with increased ease  Progress toward goals: good     POST-PAIN       Pain Rating (0-10 pain scale):  3 /10   Location and pain description same as pre-treatment unless indicated. Action: [] NA   [x] Perform HEP  [x] Meds as prescribed  [x] Modalities as prescribed   [] Call Physician     Frequency/Duration:  Plan  Times per week: 2  Plan weeks: 5  Current Treatment Recommendations: Strengthening, ROM, Balance Training, Functional Mobility Training, Transfer Training, Gait Training, Stair training, Neuromuscular Re-education, Manual Therapy - Soft Tissue Mobilization, Home Exercise Program, Patient/Caregiver Education & Training, Equipment Evaluation, Education, & procurement, Modalities, Aquatics, Safety Education & Training     Pt to continue current HEP. See objective section for any therapeutic exercise changes, additions or modifications this date.     PT Individual Minutes  Time In: 7099  Time Out: 1120  Minutes: 40  Timed Code Treatment Minutes: 38 Minutes  Procedure Minutes: n/a     Signature:  Electronically signed by Nova Jacob PT on 12/13/18 at 11:17 AM

## 2018-12-17 ENCOUNTER — HOSPITAL ENCOUNTER (OUTPATIENT)
Dept: PHYSICAL THERAPY | Age: 68
Setting detail: THERAPIES SERIES
Discharge: HOME | End: 2018-12-17
Payer: MEDICARE

## 2018-12-17 PROCEDURE — 97110 THERAPEUTIC EXERCISES: CPT

## 2018-12-17 ASSESSMENT — PAIN DESCRIPTION - DESCRIPTORS: DESCRIPTORS: ACHING

## 2018-12-17 ASSESSMENT — PAIN SCALES - GENERAL: PAINLEVEL_OUTOF10: 2

## 2018-12-17 ASSESSMENT — PAIN DESCRIPTION - LOCATION: LOCATION: HIP

## 2018-12-17 ASSESSMENT — PAIN DESCRIPTION - PAIN TYPE: TYPE: SURGICAL PAIN

## 2018-12-17 ASSESSMENT — PAIN DESCRIPTION - ORIENTATION: ORIENTATION: LEFT

## 2018-12-17 NOTE — PROGRESS NOTES
[address] [address]  Outpatient Physical Therapy    Treatment Note        Date: 2018  Patient: Moses Tomas  : 1950  ACCT #: [de-identified]  Referring Practitioner: Dr. Rachel Valdovinos  Diagnosis: DJD of hip     Visit Information:  PT Visit Information  PT Insurance Information: Medicare  Total # of Visits to Date: 5  Plan of Care/Certification Expiration Date: 18  No Show: 0  Canceled Appointment: 0  Progress Note Counter: 5/10 (PN due 19)    Subjective: Pt reports trialed sleeping without pillows under knees however difficulty sleeping and increased pain. Comments: RTD 2018   HEP Compliance:  [x] Good [] Fair [] Poor [] Reports not doing due to:    Vital Signs  Patient Currently in Pain: Yes   Pain Screening  Patient Currently in Pain: Yes  Pain Assessment  Pain Assessment: 0-10  Pain Level: 2  Pain Type: Surgical pain  Pain Location: Hip  Pain Orientation: Left  Pain Descriptors: Aching    OBJECTIVE:   Exercises  Exercise 1: SF (within precautions) L-2.0 x 5 min  Exercise 4: hip hikes x15   Exercise 6: SLR x15 b/l (Eccentric on the L, concentric on the R)   Exercise 7: bridges 5s x 15  Exercise 8: S/L hip abd x10 b/l  Exercise 9: mod ifrah stretch 30s x 3 L  Exercise 10: Rockerboard small 3 way x15 ea. Exercise 11: step ups F/L 4\" x15   Exercise 12: sidestepping 10'x4 along counter top   Exercise 19: *B hip precautions*  Exercise 20: HEP: cont current    Strength: [x] NT  [] MMT completed:    ROM: [] NT  [x] ROM measurements:  AROM LLE (degrees)  LLE General AROM: Hip flex 80, abd 38, ext lacking 12* in standing ;knee ext lacking 3* in supine     *Indicates exercise, modality, or manual techniques to be initiated when appropriate    Assessment:    Body structures, Functions, Activity limitations: Decreased functional mobility , Decreased ROM, Decreased strength, Decreased endurance, Decreased balance  Assessment: Pt demos improving L hip arom and increasing upright

## 2018-12-19 ENCOUNTER — HOSPITAL ENCOUNTER (OUTPATIENT)
Dept: PHYSICAL THERAPY | Age: 68
Setting detail: THERAPIES SERIES
Discharge: HOME | End: 2018-12-19
Payer: MEDICARE

## 2018-12-19 PROCEDURE — 97116 GAIT TRAINING THERAPY: CPT

## 2018-12-19 PROCEDURE — 97110 THERAPEUTIC EXERCISES: CPT

## 2018-12-19 ASSESSMENT — PAIN DESCRIPTION - ORIENTATION: ORIENTATION: LEFT

## 2018-12-19 ASSESSMENT — PAIN DESCRIPTION - LOCATION: LOCATION: HIP

## 2018-12-19 ASSESSMENT — PAIN SCALES - GENERAL: PAINLEVEL_OUTOF10: 3

## 2018-12-19 ASSESSMENT — PAIN DESCRIPTION - PAIN TYPE: TYPE: SURGICAL PAIN

## 2018-12-19 ASSESSMENT — PAIN DESCRIPTION - DESCRIPTORS: DESCRIPTORS: ACHING

## 2018-12-24 ENCOUNTER — HOSPITAL ENCOUNTER (OUTPATIENT)
Dept: PHYSICAL THERAPY | Age: 68
Setting detail: THERAPIES SERIES
Discharge: HOME | End: 2018-12-24
Payer: MEDICARE

## 2018-12-24 PROCEDURE — 97110 THERAPEUTIC EXERCISES: CPT

## 2018-12-24 PROCEDURE — 97112 NEUROMUSCULAR REEDUCATION: CPT

## 2018-12-27 ENCOUNTER — HOSPITAL ENCOUNTER (OUTPATIENT)
Dept: PHYSICAL THERAPY | Age: 68
Setting detail: THERAPIES SERIES
Discharge: HOME | End: 2018-12-27
Payer: MEDICARE

## 2018-12-31 ENCOUNTER — HOSPITAL ENCOUNTER (OUTPATIENT)
Dept: PHYSICAL THERAPY | Age: 68
Setting detail: THERAPIES SERIES
Discharge: HOME | End: 2018-12-31
Payer: MEDICARE

## 2018-12-31 PROCEDURE — G8978 MOBILITY CURRENT STATUS: HCPCS

## 2018-12-31 PROCEDURE — G8979 MOBILITY GOAL STATUS: HCPCS

## 2018-12-31 PROCEDURE — 97110 THERAPEUTIC EXERCISES: CPT

## 2019-01-03 ENCOUNTER — HOSPITAL ENCOUNTER (OUTPATIENT)
Dept: PHYSICAL THERAPY | Age: 69
Setting detail: THERAPIES SERIES
Discharge: HOME | End: 2019-01-03
Payer: MEDICARE

## 2019-01-03 PROCEDURE — 97110 THERAPEUTIC EXERCISES: CPT

## 2019-01-03 PROCEDURE — G8980 MOBILITY D/C STATUS: HCPCS

## 2019-01-03 PROCEDURE — G8979 MOBILITY GOAL STATUS: HCPCS

## 2019-01-03 ASSESSMENT — PAIN DESCRIPTION - LOCATION: LOCATION: HIP

## 2019-01-03 ASSESSMENT — PAIN DESCRIPTION - PAIN TYPE: TYPE: SURGICAL PAIN

## 2019-01-03 ASSESSMENT — PAIN DESCRIPTION - ORIENTATION: ORIENTATION: LEFT

## 2019-01-03 ASSESSMENT — PAIN DESCRIPTION - DESCRIPTORS: DESCRIPTORS: ACHING

## 2019-01-08 ENCOUNTER — HOSPITAL ENCOUNTER (OUTPATIENT)
Dept: PHYSICAL THERAPY | Age: 69
Setting detail: THERAPIES SERIES
End: 2019-01-08
Payer: MEDICARE

## 2019-01-14 ENCOUNTER — OFFICE VISIT (OUTPATIENT)
Dept: FAMILY MEDICINE CLINIC | Age: 69
End: 2019-01-14
Payer: MEDICARE

## 2019-01-14 VITALS
TEMPERATURE: 97.7 F | HEIGHT: 72 IN | SYSTOLIC BLOOD PRESSURE: 122 MMHG | RESPIRATION RATE: 16 BRPM | DIASTOLIC BLOOD PRESSURE: 82 MMHG | HEART RATE: 76 BPM | BODY MASS INDEX: 29.66 KG/M2 | WEIGHT: 219 LBS

## 2019-01-14 DIAGNOSIS — M16.12 PRIMARY OSTEOARTHRITIS OF LEFT HIP: ICD-10-CM

## 2019-01-14 DIAGNOSIS — E66.3 PATIENT OVERWEIGHT: ICD-10-CM

## 2019-01-14 DIAGNOSIS — G47.33 OSA (OBSTRUCTIVE SLEEP APNEA): ICD-10-CM

## 2019-01-14 DIAGNOSIS — E78.2 MIXED HYPERLIPIDEMIA: ICD-10-CM

## 2019-01-14 DIAGNOSIS — I10 ESSENTIAL HYPERTENSION: Primary | ICD-10-CM

## 2019-01-14 PROCEDURE — 1036F TOBACCO NON-USER: CPT | Performed by: FAMILY MEDICINE

## 2019-01-14 PROCEDURE — 3017F COLORECTAL CA SCREEN DOC REV: CPT | Performed by: FAMILY MEDICINE

## 2019-01-14 PROCEDURE — 1123F ACP DISCUSS/DSCN MKR DOCD: CPT | Performed by: FAMILY MEDICINE

## 2019-01-14 PROCEDURE — G8484 FLU IMMUNIZE NO ADMIN: HCPCS | Performed by: FAMILY MEDICINE

## 2019-01-14 PROCEDURE — 1101F PT FALLS ASSESS-DOCD LE1/YR: CPT | Performed by: FAMILY MEDICINE

## 2019-01-14 PROCEDURE — G8417 CALC BMI ABV UP PARAM F/U: HCPCS | Performed by: FAMILY MEDICINE

## 2019-01-14 PROCEDURE — 4040F PNEUMOC VAC/ADMIN/RCVD: CPT | Performed by: FAMILY MEDICINE

## 2019-01-14 PROCEDURE — 99214 OFFICE O/P EST MOD 30 MIN: CPT | Performed by: FAMILY MEDICINE

## 2019-01-14 PROCEDURE — G8427 DOCREV CUR MEDS BY ELIG CLIN: HCPCS | Performed by: FAMILY MEDICINE

## 2019-01-14 RX ORDER — LISINOPRIL 10 MG/1
10 TABLET ORAL DAILY
Qty: 90 TABLET | Refills: 3 | Status: SHIPPED | OUTPATIENT
Start: 2019-01-14

## 2019-01-14 RX ORDER — METOPROLOL SUCCINATE 50 MG/1
50 TABLET, EXTENDED RELEASE ORAL DAILY
Qty: 90 TABLET | Refills: 3 | Status: SHIPPED | OUTPATIENT
Start: 2019-01-14

## 2019-01-14 RX ORDER — CITALOPRAM 10 MG/1
10 TABLET ORAL DAILY
Qty: 90 TABLET | Refills: 3 | Status: SHIPPED | OUTPATIENT
Start: 2019-01-14

## 2019-01-14 RX ORDER — ROSUVASTATIN CALCIUM 5 MG/1
5 TABLET, COATED ORAL DAILY
Qty: 90 TABLET | Refills: 3 | Status: SHIPPED | OUTPATIENT
Start: 2019-01-14

## 2019-01-14 RX ORDER — AMLODIPINE BESYLATE 5 MG/1
5 TABLET ORAL DAILY
Qty: 90 TABLET | Refills: 3 | Status: SHIPPED | OUTPATIENT
Start: 2019-01-14

## 2019-02-21 ENCOUNTER — TELEPHONE (OUTPATIENT)
Dept: PRIMARY CARE CLINIC | Age: 69
End: 2019-02-21

## 2019-04-23 ENCOUNTER — OFFICE VISIT (OUTPATIENT)
Dept: PULMONOLOGY | Age: 69
End: 2019-04-23
Payer: MEDICARE

## 2019-04-23 VITALS
WEIGHT: 222 LBS | SYSTOLIC BLOOD PRESSURE: 142 MMHG | OXYGEN SATURATION: 98 % | TEMPERATURE: 98.3 F | DIASTOLIC BLOOD PRESSURE: 64 MMHG | BODY MASS INDEX: 30.07 KG/M2 | HEART RATE: 70 BPM | RESPIRATION RATE: 16 BRPM | HEIGHT: 72 IN

## 2019-04-23 DIAGNOSIS — G47.33 OSA (OBSTRUCTIVE SLEEP APNEA): Primary | ICD-10-CM

## 2019-04-23 DIAGNOSIS — E66.9 OBESITY (BMI 30-39.9): ICD-10-CM

## 2019-04-23 PROCEDURE — G8427 DOCREV CUR MEDS BY ELIG CLIN: HCPCS | Performed by: INTERNAL MEDICINE

## 2019-04-23 PROCEDURE — 99214 OFFICE O/P EST MOD 30 MIN: CPT | Performed by: INTERNAL MEDICINE

## 2019-04-23 PROCEDURE — 1123F ACP DISCUSS/DSCN MKR DOCD: CPT | Performed by: INTERNAL MEDICINE

## 2019-04-23 PROCEDURE — 4040F PNEUMOC VAC/ADMIN/RCVD: CPT | Performed by: INTERNAL MEDICINE

## 2019-04-23 PROCEDURE — 3017F COLORECTAL CA SCREEN DOC REV: CPT | Performed by: INTERNAL MEDICINE

## 2019-04-23 PROCEDURE — G8417 CALC BMI ABV UP PARAM F/U: HCPCS | Performed by: INTERNAL MEDICINE

## 2019-04-23 PROCEDURE — 1036F TOBACCO NON-USER: CPT | Performed by: INTERNAL MEDICINE

## 2019-04-23 ASSESSMENT — ENCOUNTER SYMPTOMS
COUGH: 0
ABDOMINAL PAIN: 0
CHEST TIGHTNESS: 0
WHEEZING: 0
DIARRHEA: 0
VOICE CHANGE: 0
EYE ITCHING: 0
NAUSEA: 0
VOMITING: 0
RHINORRHEA: 0
SORE THROAT: 0
SHORTNESS OF BREATH: 0

## 2019-04-23 NOTE — PROGRESS NOTES
Subjective:     Nicolás Estrada is a 76 y.o. male who complains today of:     Chief Complaint   Patient presents with    Follow-up     six month f/u for MUNA. HPI  Patient is using CPAP with  5  centimeters of H2O with heated humidity. Patient is using CPAP for about  8-9 hours every night. Patient is using CPAP with  Nasal  Mask. No need for CPAP supply. Patient said  sleep is restful with the CPAP use. Patient is compliant with CPAP therapy and benefiting with CPAP use. No snoring with CPAP use. No early morning headache. No complaint of daytime sleepiness or tiredness with CPAP use. Patient denies taking naps. No sleepiness with driving. Patient denies difficulty falling asleep or staying asleep.       Allergies:  Seasonal and Azithromycin  Past Medical History:   Diagnosis Date    Arthritis     hips    Hyperlipidemia     meds > 15 yrs    Hypertension     meds > 10 yrs    Sleep apnea      Past Surgical History:   Procedure Laterality Date    BACK SURGERY      CATARACT REMOVAL Left 03/08/2017    CERVICAL FUSION  1998    COLONOSCOPY  2016    COLONOSCOPY  2013    EYE SURGERY      phaco with IOL OU    EYE SURGERY      left repair detached retina    EYE SURGERY      right laser for retinal tear    KIDNEY TRANSPLANT  08/03/2018    RTHR    LAMINECTOMY  1988    L4    [place] TOTAL HIP ARTHROPLASTY Right 8/3/2018    RIGHT TOTAL HIP ARTHROPLASTY performed by Wei Rowe MD at [place] [address] Left 11/16/2018    LEFT TOTAL HIP ARTHROPLASTY, LATERAL DECUB, JOY 62 TM CUP 13 STEM EXTENDED OFFSET 36 HEAD O NECK performed by Wei Rowe MD at [address] [place] [address] chest squamous cell cancer    TONSILLECTOMY      as child     Family History   Problem Relation Age of Onset    Breast Cancer Mother     Cancer Father         kidney cancer    Stroke Father     Heart Disease Father         2-3 vessel bypass    Arthritis Father     No Known Problems Sister     Arthritis Brother     Cancer Brother         prostate cancer    High Blood Pressure Brother     Depression Daughter     Other Daughter         fibromyalgia    Thyroid Disease Daughter     Sleep Apnea Daughter      Social History     Socioeconomic History    Marital status:      Spouse name: Not on file    Number of children: Not on file    Years of education: Not on file    Highest education level: Not on file   Occupational History    Not on file   Social Needs    Financial resource strain: Not on file    Food insecurity:     Worry: Not on file     Inability: Not on file    Transportation needs:     Medical: Not on file     Non-medical: Not on file   Tobacco Use    Smoking status: Former Smoker     Packs/day: 1.00     Years: 2.00     Pack years: 2.00     Last attempt to quit: 1976     Years since quittin.3    Smokeless tobacco: Never Used    Tobacco comment: smoked at age 25s x 2 yrs / 1ppd   Substance and Sexual Activity    Alcohol use: Yes     Alcohol/week: 0.0 oz     Comment: 2-3 glasses wine per day    Drug use: No    Sexual activity: Not on file   Lifestyle    Physical activity:     Days per week: Not on file     Minutes per session: Not on file    Stress: Not on file   Relationships    Social connections:     Talks on phone: Not on file     Gets together: Not on file     Attends [employer] service: Not on file     Active member of club or organization: Not on file     Attends meetings of clubs or organizations: Not on file     Relationship status: Not on file    Intimate partner violence:     Fear of current or ex partner: Not on file     Emotionally abused: Not on file     Physically abused: Not on file     Forced sexual activity: Not on file   Other Topics Concern    Not on file   Social History Narrative    Not on file         Review of Systems   Constitutional: Negative for chills, diaphoresis, fatigue and fever.    HENT: Negative for congestion, mouth is warm and dry. No rash noted. Psychiatric: He has a normal mood and affect. His behavior is normal.       Current Outpatient Medications   Medication Sig Dispense Refill    metoprolol succinate (TOPROL XL) 50 MG extended release tablet Take 1 tablet by mouth daily 90 tablet 3    lisinopril (PRINIVIL;ZESTRIL) 10 MG tablet Take 1 tablet by mouth daily 90 tablet 3    citalopram (CELEXA) 10 MG tablet Take 1 tablet by mouth daily 90 tablet 3    amLODIPine (NORVASC) 5 MG tablet Take 1 tablet by mouth daily 90 tablet 3    rosuvastatin (CRESTOR) 5 MG tablet Take 1 tablet by mouth daily 90 tablet 3    Respiratory Therapy Supplies SHELIA New CPAP mask and supplies 1 Device 0    CPAP Machine MISC by Does not apply route      aspirin 81 MG EC tablet Take 1 tablet by mouth 2 times daily 60 tablet 0     No current facility-administered medications for this visit. Assessment/Plan:     1. MUNA (obstructive sleep apnea)  Patient is using CPAP with  5  centimeters of H2O with heated humidity. She  is using CPAP for about  8-9 hours every night. She  is using CPAP with  Nasal  Mask. No need for CPAP supply. She  said  sleep is restful with the CPAP use. She  is compliant with CPAP therapy. No need for CPAP supply. Continue CPAP therapy as before. Counseling: CPAP uses, patient advised to use CPAP at least 5-6 hours every night. Driving: patient is advised for extreme caution when driving or operating machinery if there is a feeling of drowsiness, especially while driving it is preferable to stop driving and take a brief nap. Sleep hygiene:Avoid supine sleep, sleep on  sides. Avoid  sleep deprivation. Explained sleep hygiene. Advice to avoid Alcohol and sedative    Time spend over 25 min. Face to face. with greater than 50 % time with counseling regarding CPAP therapy. 2. Obesity (BMI 30-39. 9)  Patient patient is advised try to lose weight.  obesity related risk explained to the patient ,  Current weight:  222 lb (100.7 kg) Lbs. BMI:  Body mass index is 30.11 kg/m². Return in about 6 months (around 10/23/2019) for corrine.       Sandra Messer MD

## 2019-11-05 ENCOUNTER — OFFICE VISIT (OUTPATIENT)
Dept: PULMONOLOGY | Age: 69
End: 2019-11-05
Payer: MEDICARE

## 2019-11-05 VITALS
HEIGHT: 72 IN | SYSTOLIC BLOOD PRESSURE: 138 MMHG | BODY MASS INDEX: 30.34 KG/M2 | RESPIRATION RATE: 16 BRPM | TEMPERATURE: 97.2 F | HEART RATE: 79 BPM | OXYGEN SATURATION: 97 % | DIASTOLIC BLOOD PRESSURE: 74 MMHG | WEIGHT: 224 LBS

## 2019-11-05 DIAGNOSIS — E66.9 OBESITY (BMI 30-39.9): ICD-10-CM

## 2019-11-05 DIAGNOSIS — G47.33 OSA (OBSTRUCTIVE SLEEP APNEA): Primary | ICD-10-CM

## 2019-11-05 PROCEDURE — 3017F COLORECTAL CA SCREEN DOC REV: CPT | Performed by: INTERNAL MEDICINE

## 2019-11-05 PROCEDURE — G8484 FLU IMMUNIZE NO ADMIN: HCPCS | Performed by: INTERNAL MEDICINE

## 2019-11-05 PROCEDURE — 1036F TOBACCO NON-USER: CPT | Performed by: INTERNAL MEDICINE

## 2019-11-05 PROCEDURE — G8427 DOCREV CUR MEDS BY ELIG CLIN: HCPCS | Performed by: INTERNAL MEDICINE

## 2019-11-05 PROCEDURE — 1123F ACP DISCUSS/DSCN MKR DOCD: CPT | Performed by: INTERNAL MEDICINE

## 2019-11-05 PROCEDURE — 99214 OFFICE O/P EST MOD 30 MIN: CPT | Performed by: INTERNAL MEDICINE

## 2019-11-05 PROCEDURE — 4040F PNEUMOC VAC/ADMIN/RCVD: CPT | Performed by: INTERNAL MEDICINE

## 2019-11-05 PROCEDURE — G8417 CALC BMI ABV UP PARAM F/U: HCPCS | Performed by: INTERNAL MEDICINE

## 2019-11-05 ASSESSMENT — ENCOUNTER SYMPTOMS
ABDOMINAL PAIN: 0
SHORTNESS OF BREATH: 0
COUGH: 0
WHEEZING: 0
SORE THROAT: 0
VOICE CHANGE: 0
NAUSEA: 0
VOMITING: 0
RHINORRHEA: 0
CHEST TIGHTNESS: 0
DIARRHEA: 0
EYE ITCHING: 0

## 2020-11-06 ENCOUNTER — OFFICE VISIT (OUTPATIENT)
Dept: PULMONOLOGY | Age: 70
End: 2020-11-06
Payer: MEDICARE

## 2020-11-06 VITALS
DIASTOLIC BLOOD PRESSURE: 74 MMHG | SYSTOLIC BLOOD PRESSURE: 126 MMHG | HEIGHT: 72 IN | WEIGHT: 224 LBS | OXYGEN SATURATION: 97 % | BODY MASS INDEX: 30.34 KG/M2 | TEMPERATURE: 97.5 F | RESPIRATION RATE: 16 BRPM | HEART RATE: 71 BPM

## 2020-11-06 PROCEDURE — G8484 FLU IMMUNIZE NO ADMIN: HCPCS | Performed by: INTERNAL MEDICINE

## 2020-11-06 PROCEDURE — 1123F ACP DISCUSS/DSCN MKR DOCD: CPT | Performed by: INTERNAL MEDICINE

## 2020-11-06 PROCEDURE — 4040F PNEUMOC VAC/ADMIN/RCVD: CPT | Performed by: INTERNAL MEDICINE

## 2020-11-06 PROCEDURE — 3017F COLORECTAL CA SCREEN DOC REV: CPT | Performed by: INTERNAL MEDICINE

## 2020-11-06 PROCEDURE — G8417 CALC BMI ABV UP PARAM F/U: HCPCS | Performed by: INTERNAL MEDICINE

## 2020-11-06 PROCEDURE — 99214 OFFICE O/P EST MOD 30 MIN: CPT | Performed by: INTERNAL MEDICINE

## 2020-11-06 PROCEDURE — 1036F TOBACCO NON-USER: CPT | Performed by: INTERNAL MEDICINE

## 2020-11-06 PROCEDURE — G8427 DOCREV CUR MEDS BY ELIG CLIN: HCPCS | Performed by: INTERNAL MEDICINE

## 2020-11-06 ASSESSMENT — ENCOUNTER SYMPTOMS
VOICE CHANGE: 0
CHEST TIGHTNESS: 0
VOMITING: 0
WHEEZING: 0
COUGH: 0
NAUSEA: 0
ABDOMINAL PAIN: 0
DIARRHEA: 0
EYE ITCHING: 0
RHINORRHEA: 0
SHORTNESS OF BREATH: 0
SORE THROAT: 0

## 2020-11-06 NOTE — PROGRESS NOTES
Subjective:     Charmaine Holbrook is a 79 y.o. male who complains today of:     Chief Complaint   Patient presents with    Follow-up     one year f/u for MUNA. HPI  He is using CPAP with  5  centimeters of H2O with heated humidity. He is using CPAP for about 9-10    hours every night. He is using CPAP with  Nasal Mask. He said  sleep is restful with the CPAP use. He is compliant with CPAP therapy and benefiting with CPAP use. No snoring with CPAP use. He has no c/o vivid dreams or night jeong. No complaint of daytime sleepiness or tiredness with CPAP use. He denies taking naps. No sleepiness with driving. He denies difficulty falling asleep or staying asleep. Need new CPAP supply.      Allergies:  Seasonal and Azithromycin  Past Medical History:   Diagnosis Date    Arthritis     hips    Hyperlipidemia     meds > 15 yrs    Hypertension     meds > 10 yrs    Sleep apnea      Past Surgical History:   Procedure Laterality Date    BACK SURGERY      CATARACT REMOVAL Left 03/08/2017    CERVICAL FUSION  1998    COLONOSCOPY  2016    COLONOSCOPY  2013    EYE SURGERY      phaco with IOL OU    EYE SURGERY      left repair detached retina    EYE SURGERY      right laser for retinal tear    KIDNEY TRANSPLANT  08/03/2018    RTHR    LAMINECTOMY  1988    L4    [place] TOTAL HIP ARTHROPLASTY Right 8/3/2018    RIGHT TOTAL HIP ARTHROPLASTY performed by Arianna David MD at [address] Left 11/16/2018    LEFT TOTAL HIP ARTHROPLASTY, LATERAL DECUB, JOY 62 TM CUP 13 STEM EXTENDED OFFSET 36 HEAD O NECK performed by Arianna David MD at [facility] squamous cell cancer    TONSILLECTOMY      as child     Family History   Problem Relation Age of Onset    Breast Cancer Mother     Cancer Father         kidney cancer    Stroke Father     Heart Disease Father         2-3 vessel bypass    Arthritis Father     No Known Problems Sister    [place] Favorite Arthritis Brother     Cancer Brother         prostate cancer    High Blood Pressure Brother     Depression Daughter     Other Daughter         fibromyalgia    Thyroid Disease Daughter     Sleep Apnea Daughter      Social History     Socioeconomic History    Marital status:      Spouse name: Not on file    Number of children: Not on file    Years of education: Not on file    Highest education level: Not on file   Occupational History    Not on file   Social Needs    Financial resource strain: Not on file    Food insecurity     Worry: Not on file     Inability: Not on file   [employer] needs     Medical: Not on file     Non-medical: Not on file   Tobacco Use    Smoking status: Former Smoker     Packs/day: 1.00     Years: 2.00     Pack years: 2.00     Last attempt to quit: 1976     Years since quittin.8    Smokeless tobacco: Never Used    Tobacco comment: smoked at age 25s x 2 yrs / 1ppd   Substance and Sexual Activity    Alcohol use: Yes     Alcohol/week: 0.0 standard drinks     Comment: 2-3 glasses wine per day    Drug use: No    Sexual activity: Not on file   Lifestyle    Physical activity     Days per week: Not on file     Minutes per session: Not on file    Stress: Not on file   Relationships    Social connections     Talks on phone: Not on file     Gets together: Not on file     Attends Buddhist service: Not on file     Active member of club or organization: Not on file     Attends meetings of clubs or organizations: Not on file     Relationship status: Not on file    Intimate partner violence     Fear of current or ex partner: Not on file     Emotionally abused: Not on file     Physically abused: Not on file     Forced sexual activity: Not on file   Other Topics Concern    Not on file   Social History Narrative    Not on file         Review of Systems   Constitutional: Negative for chills, diaphoresis, fatigue and fever.    HENT: Negative for congestion, mouth sores, nosebleeds, postnasal drip, rhinorrhea, sneezing, sore throat and voice change. Eyes: Negative for itching and visual disturbance. Respiratory: Negative for cough, chest tightness, shortness of breath and wheezing. Cardiovascular: Negative. Negative for chest pain, palpitations and leg swelling. Gastrointestinal: Negative for abdominal pain, diarrhea, nausea and vomiting. Genitourinary: Negative for difficulty urinating and hematuria. Musculoskeletal: Negative for arthralgias, joint swelling and myalgias. Skin: Negative for rash. Allergic/Immunologic: Negative for environmental allergies. Neurological: Negative for dizziness, tremors, weakness and headaches. Psychiatric/Behavioral: Positive for sleep disturbance. Negative for behavioral problems. :     Vitals:    11/06/20 1116   BP: 126/74   Pulse: 71   Resp: 16   Temp: 97.5 °F (36.4 °C)   TempSrc: Temporal   SpO2: 97%   Weight: 224 lb (101.6 kg)   Height: 6' (1.829 m)     Wt Readings from Last 3 Encounters:   11/06/20 224 lb (101.6 kg)   11/05/19 224 lb (101.6 kg)   04/23/19 222 lb (100.7 kg)         Physical Exam  Constitutional:       Appearance: He is well-developed. He is obese. HENT:      Head: Normocephalic and atraumatic. Nose: Nose normal.   Eyes:      Conjunctiva/sclera: Conjunctivae normal.      Pupils: Pupils are equal, round, and reactive to light. Neck:      Thyroid: No thyromegaly. Vascular: No JVD. Trachea: No tracheal deviation. Cardiovascular:      Rate and Rhythm: Normal rate and regular rhythm. Heart sounds: No murmur. No friction rub. No gallop. Pulmonary:      Effort: Pulmonary effort is normal. No respiratory distress. Breath sounds: Normal breath sounds. No wheezing or rales. Chest:      Chest wall: No tenderness. Abdominal:      General: There is no distension. Musculoskeletal: Normal range of motion. Lymphadenopathy:      Cervical: No cervical adenopathy. Skin:     General: Skin is warm and dry. Findings: No rash. Neurological:      Mental Status: He is alert and oriented to person, place, and time. Cranial Nerves: No cranial nerve deficit. Psychiatric:         Behavior: Behavior normal.         Current Outpatient Medications   Medication Sig Dispense Refill    metoprolol succinate (TOPROL XL) 50 MG extended release tablet Take 1 tablet by mouth daily 90 tablet 3    lisinopril (PRINIVIL;ZESTRIL) 10 MG tablet Take 1 tablet by mouth daily 90 tablet 3    citalopram (CELEXA) 10 MG tablet Take 1 tablet by mouth daily 90 tablet 3    amLODIPine (NORVASC) 5 MG tablet Take 1 tablet by mouth daily 90 tablet 3    rosuvastatin (CRESTOR) 5 MG tablet Take 1 tablet by mouth daily 90 tablet 3    Respiratory Therapy Supplies SHELIA New CPAP mask and supplies 1 Device 0    CPAP Machine MISC by Does not apply route      aspirin 81 MG EC tablet Take 1 tablet by mouth 2 times daily 60 tablet 0     No current facility-administered medications for this visit. Assessment/Plan:     1. MUNA (obstructive sleep apnea)  He is using CPAP with  5  centimeters of H2O with heated humidity. He is using CPAP for about 9-10    hours every night. He is using CPAP with  Nasal Mask. He said  sleep is restful with the CPAP use. He is compliant with CPAP therapy and benefiting with CPAP use. No snoring with CPAP use. See need new CPAP supply. .  Continue CPAP therapy as before. Counseling: CPAP/BiPAP uses, patient advised to use CPAP at least 5-6 hours every night. Driving: patient is advised for extreme caution when driving or operating machinery if there is a feeling of drowsiness, especially while driving it is preferable to stop driving and take a brief nap. Sleep hygiene:Avoid supine sleep, sleep on  sides. Avoid  sleep deprivation. Explained sleep hygiene. Advice to avoid Alcohol and sedative    Time spend over 25 min. Face to face. with greater than 50 % time with

## 2021-12-15 ENCOUNTER — OFFICE VISIT (OUTPATIENT)
Dept: PULMONOLOGY | Age: 71
End: 2021-12-15
Payer: MEDICARE

## 2021-12-15 VITALS
DIASTOLIC BLOOD PRESSURE: 80 MMHG | SYSTOLIC BLOOD PRESSURE: 146 MMHG | BODY MASS INDEX: 30.34 KG/M2 | OXYGEN SATURATION: 96 % | TEMPERATURE: 97.7 F | WEIGHT: 224 LBS | HEART RATE: 76 BPM | HEIGHT: 72 IN

## 2021-12-15 DIAGNOSIS — G47.33 OSA (OBSTRUCTIVE SLEEP APNEA): Primary | ICD-10-CM

## 2021-12-15 DIAGNOSIS — E66.9 OBESITY (BMI 30-39.9): ICD-10-CM

## 2021-12-15 PROCEDURE — G8427 DOCREV CUR MEDS BY ELIG CLIN: HCPCS | Performed by: INTERNAL MEDICINE

## 2021-12-15 PROCEDURE — G8484 FLU IMMUNIZE NO ADMIN: HCPCS | Performed by: INTERNAL MEDICINE

## 2021-12-15 PROCEDURE — 4040F PNEUMOC VAC/ADMIN/RCVD: CPT | Performed by: INTERNAL MEDICINE

## 2021-12-15 PROCEDURE — 99214 OFFICE O/P EST MOD 30 MIN: CPT | Performed by: INTERNAL MEDICINE

## 2021-12-15 PROCEDURE — 1123F ACP DISCUSS/DSCN MKR DOCD: CPT | Performed by: INTERNAL MEDICINE

## 2021-12-15 PROCEDURE — 1036F TOBACCO NON-USER: CPT | Performed by: INTERNAL MEDICINE

## 2021-12-15 PROCEDURE — 3017F COLORECTAL CA SCREEN DOC REV: CPT | Performed by: INTERNAL MEDICINE

## 2021-12-15 PROCEDURE — G8419 CALC BMI OUT NRM PARAM NOF/U: HCPCS | Performed by: INTERNAL MEDICINE

## 2021-12-15 ASSESSMENT — ENCOUNTER SYMPTOMS
WHEEZING: 0
DIARRHEA: 0
SORE THROAT: 0
COUGH: 0
VOICE CHANGE: 0
ABDOMINAL PAIN: 0
CHEST TIGHTNESS: 0
NAUSEA: 0
VOMITING: 0
EYE ITCHING: 0
SHORTNESS OF BREATH: 0
RHINORRHEA: 0

## 2021-12-15 NOTE — PROGRESS NOTES
Subjective:     Lennox Anderson is a 70 y.o. male who complains today of:     Chief Complaint   Patient presents with    1 Year Follow Up    Sleep Apnea       HPI  He is using CPAP with  5 centimeters of H2O with heated humidity. He has CPAP more than 11years old and he want ne CPAP   He said he got CPAP 2018. He is using CPAP for about   8-9  hours every night. He is using CPAP with  Nasal  Mask. He said  sleep is restful with the CPAP use. He is compliant with CPAP therapy and benefiting with CPAP use. No snoring with CPAP use. No complaint of daytime sleepiness or tiredness with CPAP use. He denies taking naps. No sleepiness with driving. He denies difficulty falling asleep or staying asleep.       Allergies:  Seasonal and Azithromycin  Past Medical History:   Diagnosis Date    Arthritis     hips    Hyperlipidemia     meds > 15 yrs    Hypertension     meds > 10 yrs    Sleep apnea      Past Surgical History:   Procedure Laterality Date    BACK SURGERY      CATARACT REMOVAL Left 03/08/2017    CERVICAL FUSION  1998    COLONOSCOPY  2016    COLONOSCOPY  2013    EYE SURGERY      phaco with IOL OU    EYE SURGERY      left repair detached retina    EYE SURGERY      right laser for retinal tear    KIDNEY TRANSPLANT  08/03/2018    RTHR    LAMINECTOMY  1988    L4    [place] T[facility]LASTY Right 8/3/2018    RIGHT TOTAL HIP ARTHROPLASTY performed by Angeles Brantley MD at [address] Left 11/16/2018    LEFT TOTAL HIP ARTHROPLASTY, LATERAL DECUB, JOY 62 TM CUP 13 STEM EXTENDED OFFSET 36 HEAD O NECK performed by Angeles Brantley MD at [address] [facility] squamous cell cancer    TONSILLECTOMY      as child     Family History   Problem Relation Age of Onset    Breast Cancer Mother     Cancer Father         kidney cancer    Stroke Father     Heart Disease Father         2-3 vessel bypass    Arthritis Father     No Known Problems Sister  Arthritis Brother     Cancer Brother         prostate cancer    High Blood Pressure Brother     Depression Daughter     Other Daughter         fibromyalgia    Thyroid Disease Daughter     Sleep Apnea Daughter      Social History     Socioeconomic History    Marital status:      Spouse name: Not on file    Number of children: Not on file    Years of education: Not on file    Highest education level: Not on file   Occupational History    Not on file   Tobacco Use    Smoking status: Former Smoker     Packs/day: 1.00     Years: 2.00     Pack years: 2.00     Quit date: 1976     Years since quittin.9    Smokeless tobacco: Never Used    Tobacco comment: smoked at age 25s x 2 yrs / 1ppd   Substance and Sexual Activity    Alcohol use: Yes     Alcohol/week: 0.0 standard drinks     Comment: 2-3 glasses wine per day    Drug use: No    Sexual activity: Not on file   Other Topics Concern    Not on file   Social History Narrative    Not on file     Social Determinants of Health     Financial Resource Strain:     Difficulty of Paying Living Expenses: Not on file   Food Insecurity:     Worried About Running Out of Food in the Last Year: Not on file    Gilbert of Food in the Last Year: Not on file   Transportation Needs:     Lack of Transportation (Medical): Not on file    Lack of Transportation (Non-Medical):  Not on file   Physical Activity:     Days of Exercise per Week: Not on file    Minutes of Exercise per Session: Not on file   Stress:     Feeling of Stress : Not on file   Social Connections:     Frequency of Communication with Friends and Family: Not on file    Frequency of Social Gatherings with Friends and Family: Not on file    Attends Hoahaoism Services: Not on file    Active Member of Clubs or Organizations: Not on file    Attends Club or Organization Meetings: Not on file    Marital Status: Not on file   Intimate Partner Violence:     Fear of Current or Ex-Partner: Not on file    Emotionally Abused: Not on file    Physically Abused: Not on file    Sexually Abused: Not on file   Housing Stability:     Unable to Pay for Housing in the Last Year: Not on file    Number of Places Lived in the Last Year: Not on file    Unstable Housing in the Last Year: Not on file         Review of Systems   Constitutional: Negative for chills, diaphoresis, fatigue and fever. HENT: Negative for congestion, mouth sores, nosebleeds, postnasal drip, rhinorrhea, sneezing, sore throat and voice change. Eyes: Negative for itching and visual disturbance. Respiratory: Negative for cough, chest tightness, shortness of breath and wheezing. Cardiovascular: Negative. Negative for chest pain, palpitations and leg swelling. Gastrointestinal: Negative for abdominal pain, diarrhea, nausea and vomiting. Genitourinary: Negative for difficulty urinating and hematuria. Musculoskeletal: Negative for arthralgias, joint swelling and myalgias. Skin: Negative for rash. Allergic/Immunologic: Negative for environmental allergies. Neurological: Negative for dizziness, tremors, weakness and headaches. Psychiatric/Behavioral: Positive for sleep disturbance. Negative for behavioral problems. :     Vitals:    12/15/21 1059 12/15/21 1101   BP: (!) 149/81 (!) 146/80   Site: Left Upper Arm Right Upper Arm   Position: Sitting Sitting   Cuff Size: Large Adult Large Adult   Pulse: 76    Temp: 97.7 °F (36.5 °C)    TempSrc: Tympanic    SpO2: 96%    Weight: 224 lb (101.6 kg)    Height: 6' (1.829 m)      Wt Readings from Last 3 Encounters:   12/15/21 224 lb (101.6 kg)   11/06/20 224 lb (101.6 kg)   11/05/19 224 lb (101.6 kg)         Physical Exam  Constitutional:       Appearance: He is well-developed. HENT:      Head: Normocephalic and atraumatic. Nose: Nose normal.   Eyes:      Conjunctiva/sclera: Conjunctivae normal.      Pupils: Pupils are equal, round, and reactive to light.    Neck: Thyroid: No thyromegaly. Vascular: No JVD. Trachea: No tracheal deviation. Cardiovascular:      Rate and Rhythm: Normal rate and regular rhythm. Heart sounds: No murmur heard. No friction rub. No gallop. Pulmonary:      Effort: Pulmonary effort is normal. No respiratory distress. Breath sounds: Normal breath sounds. No wheezing or rales. Chest:      Chest wall: No tenderness. Abdominal:      General: There is no distension. Musculoskeletal:         General: Normal range of motion. Lymphadenopathy:      Cervical: No cervical adenopathy. Skin:     General: Skin is warm and dry. Findings: No rash. Neurological:      Mental Status: He is alert and oriented to person, place, and time. Cranial Nerves: No cranial nerve deficit. Psychiatric:         Behavior: Behavior normal.         Current Outpatient Medications   Medication Sig Dispense Refill    CPAP Machine MISC by Does not apply route New CPAP with 5 and and CPAP supply. 1 each 0    metoprolol succinate (TOPROL XL) 50 MG extended release tablet Take 1 tablet by mouth daily 90 tablet 3    lisinopril (PRINIVIL;ZESTRIL) 10 MG tablet Take 1 tablet by mouth daily (Patient taking differently: Take 20 mg by mouth daily ) 90 tablet 3    citalopram (CELEXA) 10 MG tablet Take 1 tablet by mouth daily 90 tablet 3    amLODIPine (NORVASC) 5 MG tablet Take 1 tablet by mouth daily 90 tablet 3    rosuvastatin (CRESTOR) 5 MG tablet Take 1 tablet by mouth daily 90 tablet 3    Respiratory Therapy Supplies SHELIA New CPAP mask and supplies 1 Device 0    CPAP Machine MISC by Does not apply route      aspirin 81 MG EC tablet Take 1 tablet by mouth 2 times daily 60 tablet 0     No current facility-administered medications for this visit. Assessment/Plan:     1. MUNA (obstructive sleep apnea)  He is using CPAP with  5 centimeters of H2O with heated humidity. He has CPAP more than 11years old and he want ne CPAP He said he got CPAP 2018. He is using CPAP for about   8-9  hours every night. He is using CPAP with  Nasal  Mask. He said  sleep is restful with the CPAP use. He is compliant with CPAP therapy and benefiting with CPAP use. No snoring with CPAP use. Continue CPAP as before. - New CPAP with 5 cm     Counseling: CPAP/BiPAP uses, He advised to use CPAP at least 5-6 hours every night. Driving: He is advised for extreme caution when driving or operating machinery if there is a feeling of drowsiness, especially while driving it is preferable to stop driving and take a brief nap. Sleep hygiene:Avoid supine sleep, sleep on  sides. Avoid  sleep deprivation. Explained sleep hygiene. Advice to avoid Alcohol and sedative    Time spend over 30 min. Face to face. with greater than 50 % time with CPAP therapy including review compliance, counseling and advised regarding CPAP therapy. 2. Obesity (BMI 30-39. 9)  He is advised try to lose weight. obesity related risk explained to the patient ,  Current weight:  224 lb (101.6 kg) Lbs. BMI:  Body mass index is 30.38 kg/m². Suggested weight control approaches, including dietary changes , exercise, behavioral modification. Return in about 1 year (around 12/15/2022) for corrine.       Katrin Moreno MD

## 2022-02-23 RX ORDER — POLYETHYLENE GLYCOL 3350, SODIUM CHLORIDE, SODIUM BICARBONATE, POTASSIUM CHLORIDE 420; 11.2; 5.72; 1.48 G/4L; G/4L; G/4L; G/4L
4000 POWDER, FOR SOLUTION ORAL ONCE
Qty: 4000 ML | Refills: 0 | Status: SHIPPED | OUTPATIENT
Start: 2022-02-23 | End: 2022-02-23

## 2022-02-23 NOTE — TELEPHONE ENCOUNTER
Patient requesting medication refill. Please approve or deny this request.    Patient scheduled 03/10/22 for colonoscopy.     Rx requested:  Requested Prescriptions     Pending Prescriptions Disp Refills    polyethylene glycol-electrolytes (TRILYTE) 420 g solution 4000 mL 0     Sig: Take 4,000 mLs by mouth once for 1 dose CALL [phone] FOR ISSUES WITH INSURANCE         Last Office Visit:   Visit date not found      Next Visit Date:  Future Appointments   Date Time Provider Edd Vaca   12/15/2022 10:45 AM Darlynn Paget, MD [facility]

## 2022-03-09 ENCOUNTER — ANESTHESIA EVENT (OUTPATIENT)
Dept: ENDOSCOPY | Age: 72
End: 2022-03-09
Payer: MEDICARE

## 2022-03-10 ENCOUNTER — ANESTHESIA (OUTPATIENT)
Dept: ENDOSCOPY | Age: 72
End: 2022-03-10
Payer: MEDICARE

## 2022-03-10 ENCOUNTER — ANCILLARY PROCEDURE (OUTPATIENT)
Dept: ENDOSCOPY | Age: 72
End: 2022-03-10
Attending: SPECIALIST
Payer: MEDICARE

## 2022-03-10 ENCOUNTER — HOSPITAL ENCOUNTER (OUTPATIENT)
Age: 72
Setting detail: OUTPATIENT SURGERY
Discharge: HOME | End: 2022-03-10
Attending: SPECIALIST | Admitting: SPECIALIST
Payer: MEDICARE

## 2022-03-10 VITALS
SYSTOLIC BLOOD PRESSURE: 114 MMHG | DIASTOLIC BLOOD PRESSURE: 56 MMHG | OXYGEN SATURATION: 98 % | RESPIRATION RATE: 13 BRPM

## 2022-03-10 VITALS
RESPIRATION RATE: 16 BRPM | OXYGEN SATURATION: 97 % | BODY MASS INDEX: 28.17 KG/M2 | WEIGHT: 208 LBS | HEIGHT: 72 IN | TEMPERATURE: 97.8 F | SYSTOLIC BLOOD PRESSURE: 143 MMHG | HEART RATE: 61 BPM | DIASTOLIC BLOOD PRESSURE: 77 MMHG

## 2022-03-10 PROCEDURE — 45380 COLONOSCOPY AND BIOPSY: CPT | Performed by: SPECIALIST

## 2022-03-10 PROCEDURE — 3609027000 HC COLONOSCOPY: Performed by: SPECIALIST

## 2022-03-10 PROCEDURE — 2709999900 HC NON-CHARGEABLE SUPPLY: Performed by: SPECIALIST

## 2022-03-10 PROCEDURE — 7100000010 HC PHASE II RECOVERY - FIRST 15 MIN: Performed by: SPECIALIST

## 2022-03-10 PROCEDURE — 3700000000 HC ANESTHESIA ATTENDED CARE: Performed by: SPECIALIST

## 2022-03-10 PROCEDURE — 45385 COLONOSCOPY W/LESION REMOVAL: CPT | Performed by: SPECIALIST

## 2022-03-10 PROCEDURE — 2500000003 HC RX 250 WO HCPCS: Performed by: NURSE ANESTHETIST, CERTIFIED REGISTERED

## 2022-03-10 PROCEDURE — 88305 TISSUE EXAM BY PATHOLOGIST: CPT

## 2022-03-10 PROCEDURE — 2580000003 HC RX 258: Performed by: SPECIALIST

## 2022-03-10 PROCEDURE — 7100000011 HC PHASE II RECOVERY - ADDTL 15 MIN: Performed by: SPECIALIST

## 2022-03-10 PROCEDURE — 6370000000 HC RX 637 (ALT 250 FOR IP): Performed by: SPECIALIST

## 2022-03-10 PROCEDURE — 3700000001 HC ADD 15 MINUTES (ANESTHESIA): Performed by: SPECIALIST

## 2022-03-10 PROCEDURE — 6360000002 HC RX W HCPCS: Performed by: NURSE ANESTHETIST, CERTIFIED REGISTERED

## 2022-03-10 PROCEDURE — 2580000003 HC RX 258

## 2022-03-10 RX ORDER — SIMETHICONE 20 MG/.3ML
EMULSION ORAL PRN
Status: DISCONTINUED | OUTPATIENT
Start: 2022-03-10 | End: 2022-03-10 | Stop reason: ALTCHOICE

## 2022-03-10 RX ORDER — PROPOFOL 10 MG/ML
INJECTION, EMULSION INTRAVENOUS PRN
Status: DISCONTINUED | OUTPATIENT
Start: 2022-03-10 | End: 2022-03-10 | Stop reason: SDUPTHER

## 2022-03-10 RX ORDER — SODIUM CHLORIDE 9 MG/ML
INJECTION, SOLUTION INTRAVENOUS
Status: COMPLETED
Start: 2022-03-10 | End: 2022-03-10

## 2022-03-10 RX ORDER — SODIUM CHLORIDE 9 MG/ML
INJECTION, SOLUTION INTRAVENOUS CONTINUOUS
Status: DISCONTINUED | OUTPATIENT
Start: 2022-03-10 | End: 2022-03-10 | Stop reason: HOSPADM

## 2022-03-10 RX ORDER — MAGNESIUM HYDROXIDE 1200 MG/15ML
LIQUID ORAL PRN
Status: DISCONTINUED | OUTPATIENT
Start: 2022-03-10 | End: 2022-03-10 | Stop reason: ALTCHOICE

## 2022-03-10 RX ORDER — LIDOCAINE HYDROCHLORIDE 20 MG/ML
INJECTION, SOLUTION INFILTRATION; PERINEURAL PRN
Status: DISCONTINUED | OUTPATIENT
Start: 2022-03-10 | End: 2022-03-10 | Stop reason: SDUPTHER

## 2022-03-10 RX ADMIN — SODIUM CHLORIDE: 9 INJECTION, SOLUTION INTRAVENOUS at 07:37

## 2022-03-10 RX ADMIN — LIDOCAINE HYDROCHLORIDE 30 MG: 20 INJECTION, SOLUTION INFILTRATION; PERINEURAL at 08:12

## 2022-03-10 RX ADMIN — PROPOFOL 550 MG: 10 INJECTION, EMULSION INTRAVENOUS at 08:12

## 2022-03-10 ASSESSMENT — LIFESTYLE VARIABLES: SMOKING_STATUS: 1

## 2022-03-10 ASSESSMENT — PAIN - FUNCTIONAL ASSESSMENT: PAIN_FUNCTIONAL_ASSESSMENT: 0-10

## 2022-03-10 NOTE — H&P
Patient Name: Yue Encarnacion  : 1950  MRN: [id number]  DATE: 03/10/22      ENDOSCOPY  History and Physical    Procedure:    [x] Diagnostic Colonoscopy       [] Screening Colonoscopy  [] EGD      [] ERCP      [] EUS       [] Other    [x] Previous office notes/History and Physical reviewed from the patients chart. Please see EMR for further details of HPI. I have examined the patient's status immediately prior to the procedure and:      Indications/HPI:    []Abdominal Pain  []Cancer- GI/Lung  []Fhx of colon CA/polyps  []History of Polyps  []Nguyens   []Melena  []Abnormal Imaging  []Dysphagia    []Persistent Pneumonia  []Anemia  []Food Impaction  [x]History of Polyps  []GI Bleed  []Pulmonary nodule/Mass  []Change in bowel habits []Heartburn/Reflux  []Rectal Bleed (BRBPR)  []Chest Pain - Non Cardiac []Heme (+) Stoo  l[]Ulcers  []Constipation  []Hemoptysis   []Varices  []Diarrhea  []Hypoxemia  []Nausea/Vomiting  []Screening   []Crohns/Colitis  []Other:   Anesthesia:   [x] MAC [] Moderate Sedation   [] General   [] None     ROS: 12 pt Review of Symptoms was negative unless mentioned above    Medications:   Prior to Admission medications    Medication Sig Start Date End Date Taking? Authorizing Provider   CPAP Machine MISC by Does not apply route New CPAP with 5 and and CPAP supply.  12/15/21  Yes Christen Alexander MD   metoprolol succinate (TOPROL XL) 50 MG extended release tablet Take 1 tablet by mouth daily 19  Yes Erica Osborne MD   lisinopril (PRINIVIL;ZESTRIL) 10 MG tablet Take 1 tablet by mouth daily  Patient taking differently: Take 20 mg by mouth daily  19  Yes Erica Osborne MD   citalopram (CELEXA) 10 MG tablet Take 1 tablet by mouth daily 19  Yes Erica Osborne MD   amLODIPine (NORVASC) 5 MG tablet Take 1 tablet by mouth daily 19  Yes Erica Osborne MD   rosuvastatin (CRESTOR) 5 MG tablet Take 1 tablet by mouth daily 19  Yes Erica Osborne MD   aspirin 81 MG EC tablet Take 1 tablet by mouth 2 times daily 11/16/18 3/10/22 Yes Farrel Minors, APRN - CNP   Respiratory Therapy Supplies SHELIA New CPAP mask and supplies 10/23/18  Yes Musa Walker MD   CPAP Machine MISC by Does not apply route   Yes Historical Provider, MD       Allergies: Allergies   Allergen Reactions    Seasonal Other (See Comments)     Sinus sx    Azithromycin Rash        History of allergic reaction to anesthesia:  No    Past Medical History:  Past Medical History:   Diagnosis Date    Arthritis     hips    Hyperlipidemia     meds > 15 yrs    Hypertension     meds > 10 yrs    Sleep apnea        Past Surgical History:  Past Surgical History:   Procedure Laterality Date    BACK SURGERY      CATARACT REMOVAL Left 2017    CERVICAL FUSION      COLONOSCOPY  03/15/2016    polyps diverticilosis, hemorrhoids    COLONOSCOPY      EYE SURGERY      phaco with IOL OU    EYE SURGERY      left repair detached retina    EYE SURGERY      right laser for retinal tear    JOINT REPLACEMENT      julio hip replacements    LAMINECTOMY      L4    [place] TOTAL HIP ARTHROPLASTY Right 8/3/2018    RIGHT TOTAL HIP ARTHROPLASTY performed by Marlen Hudson MD at [facility] Left 2018    LEFT TOTAL HIP ARTHROPLASTY, LATERAL DECUB, JOY 62 TM CUP 13 STEM EXTENDED OFFSET 36 HEAD O NECK performed by Marlen Hudson MD at [address] [place] [address] squamous cell cancer    TONSILLECTOMY      as child       Social History:  Social History     Tobacco Use    Smoking status: Former Smoker     Packs/day: 1.00     Years: 2.00     Pack years: 2.00     Quit date: 1976     Years since quittin.1    Smokeless tobacco: Never Used    Tobacco comment: smoked at age 25s x 2 yrs / 1ppd   Vaping Use    Vaping Use: Never used   Substance Use Topics    Alcohol use: Yes     Alcohol/week: 0.0 standard drinks     Comment: 3-4 glasses wine per day    Drug use:  No Vital Signs: There were no vitals filed for this visit. Physical Exam:  Cardiac:  [x]WNL  []Comments:  Pulmonary:  [x]WNL   []Comments:   Neuro/Mental Status:  [x]WNL  []Comments:  Abdominal:  [x]WNL    []Comments:  Other:   []WNL  []Comments:    Informed Consent:  The risks and benefits of the procedure have been discussed with either the patient or if they cannot consent, their representative. Assessment:  Patient examined and appropriate for planned sedation and procedure. Plan:  Proceed with planned sedation and procedure as above.     Marquis Issa MD  7:33 AM

## 2022-03-10 NOTE — ANESTHESIA PRE PROCEDURE
Department of Anesthesiology  Preprocedure Note       Name:  Gayla Berger   Age:  70 y.o.  :  1950                                          MRN:  [id number]         Date:  3/10/2022      Surgeon: Vinita Andrews):  Anastasia Spain MD    Procedure: Procedure(s):  COLORECTAL CANCER SCREENING, HIGH RISK    Medications prior to admission:   Prior to Admission medications    Medication Sig Start Date End Date Taking? Authorizing Provider   CPAP Machine MISC by Does not apply route New CPAP with 5 and and CPAP supply. 12/15/21  Yes Diana Ragland MD   metoprolol succinate (TOPROL XL) 50 MG extended release tablet Take 1 tablet by mouth daily 19  Yes Leoma Najjar, MD   lisinopril (PRINIVIL;ZESTRIL) 10 MG tablet Take 1 tablet by mouth daily  Patient taking differently: Take 20 mg by mouth daily  19  Yes Leoma Najjar, MD   citalopram (CELEXA) 10 MG tablet Take 1 tablet by mouth daily 19  Yes Leoma Najjar, MD   amLODIPine (NORVASC) 5 MG tablet Take 1 tablet by mouth daily 19  Yes Leoma Najjar, MD   rosuvastatin (CRESTOR) 5 MG tablet Take 1 tablet by mouth daily 19  Yes Leoma Najjar, MD   aspirin 81 MG EC tablet Take 1 tablet by mouth 2 times daily 11/16/18 3/10/22 Yes Jessica Joyner APRN - CNP   Respiratory Therapy Supplies SHELIA New CPAP mask and supplies 10/23/18  Yes Diana Ragland MD   CPAP Machine MISC by Does not apply route   Yes Historical Provider, MD       Current medications:    Current Facility-Administered Medications   Medication Dose Route Frequency Provider Last Rate Last Admin    0.9 % sodium chloride infusion   IntraVENous Continuous Anastasia Spain  mL/hr at 03/10/22 0737 New Bag at 03/10/22 0737       Allergies:     Allergies   Allergen Reactions    Seasonal Other (See Comments)     Sinus sx    Azithromycin Rash       Problem List:    Patient Active Problem List   Diagnosis Code    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    Arthropathy of cervical facet joint M47.812    MUNA (obstructive sleep apnea) G47.33    Patient overweight E66.3    Onychomycosis B35.1    Pain in both feet M79.671, M79.672    Mixed hyperlipidemia E78.2    Essential hypertension I10    Status post hip replacement, right Z96.641    Degenerative joint disease of left hip M16.12    Constipation K59.00    Status post total hip replacement, left J28.835    Obesity (BMI 30-39. 9) E66.9       Past Medical History:        Diagnosis Date    Arthritis     hips    Hyperlipidemia     meds > 15 yrs    Hypertension     meds > 10 yrs    Sleep apnea        Past Surgical History:        Procedure Laterality Date    BACK SURGERY      CATARACT REMOVAL Left 2017    CERVICAL FUSION      COLONOSCOPY  03/15/2016    polyps diverticilosis, hemorrhoids    COLONOSCOPY      EYE SURGERY      phaco with IOL OU    EYE SURGERY      left repair detached retina    EYE SURGERY      right laser for retinal tear    JOINT REPLACEMENT      julio hip replacements    LAMINECTOMY      L4    [place] TOTAL HIP ARTHROPLASTY Right 8/3/2018    RIGHT TOTAL HIP ARTHROPLASTY performed by Geraldo Rojo MD at [address] [facility] Left 2018    LEFT TOTAL HIP ARTHROPLASTY, LATERAL DECUB, JOY 62 TM CUP 13 STEM EXTENDED OFFSET 36 HEAD O NECK performed by Geraldo Rojo MD at [address] [facility] chest squamous cell cancer    TONSILLECTOMY      as child       Social History:    Social History     Tobacco Use    Smoking status: Former Smoker     Packs/day: 1.00     Years: 2.00     Pack years: 2.00     Quit date: 1976     Years since quittin.1    Smokeless tobacco: Never Used    Tobacco comment: smoked at age 25s x 2 yrs / 1ppd   Substance Use Topics    Alcohol use:  Yes     Alcohol/week: 0.0 standard drinks     Comment: 3-4 glasses wine per day                                Counseling given: Not Answered  Comment: smoked at age 25s x 2 yrs / 1ppd      Vital Signs (Current):   Vitals:    03/10/22 0734   BP: (!) 180/88   Pulse: 92   Resp: 16   Temp: 36.6 °C (97.8 °F)   TempSrc: Temporal   SpO2: 98%   Weight: 208 lb (94.3 kg)   Height: 6' (1.829 m)                                              BP Readings from Last 3 Encounters:   03/10/22 (!) 180/88   12/15/21 (!) 146/80   11/06/20 126/74       NPO Status: Time of last liquid consumption: 0500 (sip with Metoprolol, last prep at 2300 yesterday)                        Time of last solid consumption: 2000                        Date of last liquid consumption: 03/10/22                        Date of last solid food consumption: 03/08/22    BMI:   Wt Readings from Last 3 Encounters:   03/10/22 208 lb (94.3 kg)   12/15/21 224 lb (101.6 kg)   11/06/20 224 lb (101.6 kg)     Body mass index is 28.21 kg/m². CBC:   Lab Results   Component Value Date    WBC 8.9 11/17/2018    RBC 3.92 11/17/2018    HGB 12.0 11/17/2018    HCT 34.9 11/17/2018    MCV 89.0 11/17/2018    RDW 13.3 11/17/2018     11/17/2018       CMP:   Lab Results   Component Value Date     11/17/2018    K 4.3 11/17/2018    CL 96 11/17/2018    CO2 23 11/17/2018    BUN 11 11/17/2018    CREATININE 0.61 11/17/2018    GFRAA >60.0 11/17/2018    LABGLOM >60.0 11/17/2018    GLUCOSE 137 11/17/2018    GLUCOSE 97 05/05/2012    PROT 6.8 07/01/2016    CALCIUM 9.3 11/17/2018    BILITOT 0.5 06/06/2017    ALKPHOS 62 06/06/2017    AST 24 06/06/2017    ALT 19 06/06/2017       POC Tests: No results for input(s): POCGLU, POCNA, POCK, POCCL, POCBUN, POCHEMO, POCHCT in the last 72 hours.     Coags:   Lab Results   Component Value Date    PROTIME 11.2 11/01/2018    INR 1.1 11/01/2018       HCG (If Applicable): No results found for: PREGTESTUR, PREGSERUM, HCG, HCGQUANT     ABGs: No results found for: PHART, PO2ART, ZDI0JVQ, QLC4JRZ, BEART, F8MSTTSN     Type & Screen (If Applicable):  No results found for: LABABO, LABRH    Drug/Infectious Status (If Applicable):  No results found for: HIV, HEPCAB    COVID-19 Screening (If Applicable): No results found for: COVID19        Anesthesia Evaluation  Patient summary reviewed and Nursing notes reviewed  Airway: Mallampati: II  TM distance: >3 FB   Neck ROM: full  Mouth opening: > = 3 FB Dental:    (+) other      Pulmonary: breath sounds clear to auscultation  (+) sleep apnea: on CPAP,  current smoker                          ROS comment: MUNA   Cardiovascular:  Exercise tolerance: no interval change,   (+) hypertension:, hyperlipidemia      NYHA Classification: III                 Beta Blocker:  Not on Beta Blocker         Neuro/Psych:   Negative Neuro/Psych ROS              GI/Hepatic/Renal:   (+) bowel prep, morbid obesity          Endo/Other:    (+) : arthritis:., .                 Abdominal:   (+) obese,     Abdomen: soft. Vascular: negative vascular ROS. Other Findings:             Anesthesia Plan      MAC     ASA 3       Induction: intravenous. continuous noninvasive hemodynamic monitor    Anesthetic plan and risks discussed with patient. Plan discussed with attending.                   BILL Ambrose - CRNA   3/10/2022

## 2022-03-10 NOTE — ANESTHESIA POSTPROCEDURE EVALUATION
Department of Anesthesiology  Postprocedure Note    Patient: Millicent Baldwin  MRN: [id number]  YOB: 1950  Date of evaluation: 3/10/2022  Time:  9:02 AM     Procedure Summary     Date: 03/10/22 Room / Location: [facility] OR [address] [place] [address]    Anesthesia Start: 0968 Anesthesia Stop:     Procedure: COLORECTAL CANCER SCREENING, HIGH RISK (N/A ) Diagnosis: (History of colon polyps Z86.010)    Surgeons: Manuel Heart MD Responsible Provider: BILL Higgins CRNA    Anesthesia Type: MAC ASA Status: 3          Anesthesia Type: No value filed. [place] Phase I:      [place] Phase II:      Last vitals: Reviewed and per EMR flowsheets.        Anesthesia Post Evaluation    Patient location during evaluation: PACU  Patient participation: complete - patient participated  Level of consciousness: awake and alert  Pain score: 1  Airway patency: patent  Nausea & Vomiting: no nausea and no vomiting  Complications: no  Cardiovascular status: hemodynamically stable  Respiratory status: acceptable and nasal cannula  Hydration status: euvolemic  Comments: Report to RN, normal sinus rhythm

## 2022-03-18 ENCOUNTER — OFFICE VISIT (OUTPATIENT)
Dept: SURGERY | Age: 72
End: 2022-03-18
Payer: MEDICARE

## 2022-03-18 VITALS
BODY MASS INDEX: 29.8 KG/M2 | DIASTOLIC BLOOD PRESSURE: 80 MMHG | TEMPERATURE: 97.3 F | WEIGHT: 220 LBS | HEIGHT: 72 IN | SYSTOLIC BLOOD PRESSURE: 140 MMHG

## 2022-03-18 DIAGNOSIS — K63.89 CECUM MASS: Primary | ICD-10-CM

## 2022-03-18 PROCEDURE — 1036F TOBACCO NON-USER: CPT | Performed by: SURGERY

## 2022-03-18 PROCEDURE — G8427 DOCREV CUR MEDS BY ELIG CLIN: HCPCS | Performed by: SURGERY

## 2022-03-18 PROCEDURE — G8417 CALC BMI ABV UP PARAM F/U: HCPCS | Performed by: SURGERY

## 2022-03-18 PROCEDURE — 4040F PNEUMOC VAC/ADMIN/RCVD: CPT | Performed by: SURGERY

## 2022-03-18 PROCEDURE — 3017F COLORECTAL CA SCREEN DOC REV: CPT | Performed by: SURGERY

## 2022-03-18 PROCEDURE — 99213 OFFICE O/P EST LOW 20 MIN: CPT | Performed by: SURGERY

## 2022-03-18 PROCEDURE — G8484 FLU IMMUNIZE NO ADMIN: HCPCS | Performed by: SURGERY

## 2022-03-18 PROCEDURE — 1123F ACP DISCUSS/DSCN MKR DOCD: CPT | Performed by: SURGERY

## 2022-03-18 ASSESSMENT — ENCOUNTER SYMPTOMS
EYES NEGATIVE: 1
ALLERGIC/IMMUNOLOGIC NEGATIVE: 1
ABDOMINAL PAIN: 0
COLOR CHANGE: 0
CONSTIPATION: 0
SHORTNESS OF BREATH: 0
CHEST TIGHTNESS: 0
ABDOMINAL DISTENTION: 0
RHINORRHEA: 0
BLOOD IN STOOL: 0

## 2022-03-18 NOTE — PROGRESS NOTES
Mariaelena Castro (:  1950) is a 70 y.o. male, established patient, here for evaluation of the following chief complaint(s):  New Patient (Cecal polyp)         ASSESSMENT/PLAN:  Cecal polyp       CT scan of the abdomen and pelvis to further evaluate  Return to see me after the CT scan      Subjective   SUBJECTIVE/OBJECTIVE:  SHERRELL Castro is a 70 y.o. male who is seen at the request of Dr Kiya Stringer for an abnormal colonoscopy. The colonoscopy showed an abnormality in the the cecum A mass was present. The biopsy shows tubular adenoma. Complete colonoscopy to the cecum was done. A stricture/obstruction was not found. Patient does not have symptoms. He has a history of colon polyps in the past.  The patient does not have a family/personel history of colon cancer. Patient is not on plavix/coumadin/pradaxa/aspirin. He has not had a CT scan done. He is here with his wife. I have reviewed his old records and testing. Review of Systems   Constitutional: Negative for activity change, appetite change and unexpected weight change. HENT: Negative for congestion, nosebleeds, postnasal drip, rhinorrhea and sneezing. Eyes: Negative. Negative for visual disturbance. Respiratory: Negative for chest tightness and shortness of breath. Cardiovascular: Negative for chest pain and leg swelling. Gastrointestinal: Negative for abdominal distention, abdominal pain, blood in stool and constipation. Endocrine: Negative. Genitourinary: Negative for difficulty urinating. Musculoskeletal: Positive for arthralgias. Negative for gait problem and myalgias. Skin: Negative for color change. Allergic/Immunologic: Negative. Neurological: Negative for dizziness, light-headedness, numbness and headaches. Hematological: Does not bruise/bleed easily. Psychiatric/Behavioral: Negative for sleep disturbance. Objective   Physical Exam  Constitutional:       General: He is not in acute distress. Appearance: Normal appearance. HENT:      Mouth/Throat:      Mouth: Mucous membranes are moist.      Pharynx: Oropharynx is clear. Eyes:      Pupils: Pupils are equal, round, and reactive to light. Neck:      Comments: Neck is supple without any masses, no thyromegaly, trachea midline  Abdominal:      Palpations: There is no hepatomegaly or splenomegaly. Tenderness: There is no abdominal tenderness. Hernia: No hernia is present. Musculoskeletal:      Comments: Normal gait   Skin:     Findings: No bruising, lesion or rash. Neurological:      Mental Status: He is alert and oriented to person, place, and time. Psychiatric:         Mood and Affect: Mood normal.         Judgment: Judgment normal.         BP (!) 140/80   Temp 97.3 °F (36.3 °C)   Ht 6' (1.829 m)   Wt 220 lb (99.8 kg)   BMI 29.84 kg/m²           An electronic signature was used to authenticate this note.     --Brenda Albert MD

## 2022-03-25 ENCOUNTER — HOSPITAL ENCOUNTER (OUTPATIENT)
Dept: CT IMAGING | Age: 72
Discharge: HOME | End: 2022-03-27
Payer: MEDICARE

## 2022-03-25 DIAGNOSIS — K63.89 CECUM MASS: ICD-10-CM

## 2022-03-25 PROCEDURE — 2500000003 HC RX 250 WO HCPCS: Performed by: SURGERY

## 2022-03-25 PROCEDURE — 6360000004 HC RX CONTRAST MEDICATION: Performed by: SURGERY

## 2022-03-25 PROCEDURE — 74177 CT ABD & PELVIS W/CONTRAST: CPT

## 2022-03-25 RX ADMIN — BARIUM SULFATE 450 ML: 20 SUSPENSION ORAL at 13:35

## 2022-03-25 RX ADMIN — IOPAMIDOL 100 ML: 612 INJECTION, SOLUTION INTRAVENOUS at 14:26

## 2022-03-31 ENCOUNTER — OFFICE VISIT (OUTPATIENT)
Dept: SURGERY | Age: 72
End: 2022-03-31
Payer: MEDICARE

## 2022-03-31 ENCOUNTER — PREP FOR PROCEDURE (OUTPATIENT)
Dept: SURGERY | Age: 72
End: 2022-03-31

## 2022-03-31 VITALS
TEMPERATURE: 97.6 F | BODY MASS INDEX: 29.12 KG/M2 | SYSTOLIC BLOOD PRESSURE: 126 MMHG | WEIGHT: 215 LBS | HEIGHT: 72 IN | DIASTOLIC BLOOD PRESSURE: 82 MMHG

## 2022-03-31 DIAGNOSIS — K63.89 CECUM MASS: Primary | ICD-10-CM

## 2022-03-31 PROCEDURE — 4040F PNEUMOC VAC/ADMIN/RCVD: CPT | Performed by: SURGERY

## 2022-03-31 PROCEDURE — G8484 FLU IMMUNIZE NO ADMIN: HCPCS | Performed by: SURGERY

## 2022-03-31 PROCEDURE — 99213 OFFICE O/P EST LOW 20 MIN: CPT | Performed by: SURGERY

## 2022-03-31 PROCEDURE — 1036F TOBACCO NON-USER: CPT | Performed by: SURGERY

## 2022-03-31 PROCEDURE — 3017F COLORECTAL CA SCREEN DOC REV: CPT | Performed by: SURGERY

## 2022-03-31 PROCEDURE — 1123F ACP DISCUSS/DSCN MKR DOCD: CPT | Performed by: SURGERY

## 2022-03-31 PROCEDURE — G8427 DOCREV CUR MEDS BY ELIG CLIN: HCPCS | Performed by: SURGERY

## 2022-03-31 PROCEDURE — G8417 CALC BMI ABV UP PARAM F/U: HCPCS | Performed by: SURGERY

## 2022-03-31 RX ORDER — NEOMYCIN SULFATE 500 MG/1
TABLET ORAL
Qty: 6 TABLET | Refills: 0 | Status: SHIPPED | OUTPATIENT
Start: 2022-03-31 | End: 2022-04-10

## 2022-03-31 RX ORDER — POTASSIUM CHLORIDE 20 MEQ/1
TABLET, EXTENDED RELEASE ORAL
Qty: 3 TABLET | Refills: 0 | Status: ON HOLD | OUTPATIENT
Start: 2022-03-31 | End: 2022-04-19

## 2022-03-31 ASSESSMENT — ENCOUNTER SYMPTOMS
BLOOD IN STOOL: 0
EYES NEGATIVE: 1
RHINORRHEA: 0
CHEST TIGHTNESS: 0
ALLERGIC/IMMUNOLOGIC NEGATIVE: 1
SHORTNESS OF BREATH: 0
COLOR CHANGE: 0
ABDOMINAL PAIN: 0
CONSTIPATION: 0
ABDOMINAL DISTENTION: 0

## 2022-03-31 NOTE — PROGRESS NOTES
Eddy Rodriguez (:  1950) is a 70 y.o. male, established patient, here for evaluation of the following chief complaint(s): Other (review CT scan )         ASSESSMENT/PLAN:  Cecal polyp        right hemicolectomy with possible colostomy  I have discussed with the patient and his wife the CAT scan findings as well as the pathology report. The options of therapy(surgical and non surgical) were discussed with the patient and family. The procedure of colon resection with possible colostomy was discussed. The potential risks and complications including but not exclusive to infection(including anastomotic leakage and intra abdominal abscess), blood loss needing transfusions, wound problems, blood clots and other organ injury( including ureter damage) were discussed. The patient understands and all questions were answered to their satisfaction. The patient is agreeable to proceed with surgery. Surgery will be scheduled at the patient's convience. An epidural to supplement general anesthesia was discussed. Pre operative clearance is not needed. Blood thinners will be stopped prior to surgery. .  The patient was counseled at length about the risks of alivia Covid-19 in the perioperative period and any recovery window from their procedure. The patient was made aware that alivia Covid-19  may worsen their prognosis for recovering from their procedure  and lend to a higher morbidity and/or mortality risk. The patient was given the options of postponing their procedure. All material risks, benefits, and alternatives were discussed. The patient does wish to proceed with the procedure at this time. Please note this report has been partially produced using speech recognition software and may cause contain errors related to that system including grammar, punctuation and spelling as well as words and phrases that may seem inappropriate.  If there are questions or concerns please feel free to contact me to clarify       Subjective   SUBJECTIVE/OBJECTIVE:  Eddy Rodriguez is a 70 y.o. male who is seen in follow-up for an abnormal colonoscopy. The colonoscopy showed an abnormality in the the cecum A mass was present. The biopsy shows tubular adenoma. Complete colonoscopy to the cecum was done. A stricture/obstruction was not found. Patient does not have symptoms. He has a history of colon polyps in the past.  The patient does not have a family/personel history of colon cancer. Patient is not on plavix/coumadin/pradaxa/aspirin. A CT scan done on 3/25/2022 shows circumferential wall thickening of the cecum. Esteban Moreno He is here with his wife. I have reviewed his old records and testing. Review of Systems   Constitutional: Negative for activity change, appetite change and unexpected weight change. HENT: Negative for congestion, nosebleeds, postnasal drip, rhinorrhea and sneezing. Eyes: Negative. Negative for visual disturbance. Respiratory: Negative for chest tightness and shortness of breath. Cardiovascular: Negative for chest pain and leg swelling. Gastrointestinal: Negative for abdominal distention, abdominal pain, blood in stool and constipation. Endocrine: Negative. Genitourinary: Negative for difficulty urinating. Musculoskeletal: Positive for arthralgias. Negative for gait problem and myalgias. Skin: Negative for color change. Allergic/Immunologic: Negative. Neurological: Negative for dizziness, light-headedness, numbness and headaches. Hematological: Does not bruise/bleed easily. Psychiatric/Behavioral: Negative for sleep disturbance.          Past Medical History:   Diagnosis Date    Arthritis     hips    Hyperlipidemia     meds > 15 yrs    Hypertension     meds > 10 yrs    Sleep apnea      Past Surgical History:   Procedure Laterality Date    BACK SURGERY      CATARACT REMOVAL Left 03/08/2017    CERVICAL FUSION  1998    COLONOSCOPY  03/15/2016    polyps diverticilosis, hemorrhoids    COLONOSCOPY      COLONOSCOPY N/A 3/10/2022    COLORECTAL CANCER SCREENING, HIGH RISK performed by Faith Michaud MD at [address] with IOL OU    EYE SURGERY      left repair detached retina    EYE SURGERY      right laser for retinal tear    JOINT REPLACEMENT      julio hip replacements    LAMINECTOMY  1988    L4    [place] TOTAL HIP ARTHROPLASTY Right 8/3/2018    RIGHT TOTAL HIP ARTHROPLASTY performed by Jose Manuel Magallon MD at [address] Left 2018    LEFT TOTAL HIP ARTHROPLASTY, LATERAL DECUB, JOY 62 TM CUP 13 STEM EXTENDED OFFSET 36 HEAD O NECK performed by Jose Manuel Magallon MD at [address] [facility]] chest squamous cell cancer    TONSILLECTOMY      as child     Social History     Tobacco Use    Smoking status: Former Smoker     Packs/day: 1.00     Years: 2.00     Pack years: 2.00     Quit date: 1976     Years since quittin.2    Smokeless tobacco: Never Used    Tobacco comment: smoked at age 25s x 2 yrs / 1ppd   Vaping Use    Vaping Use: Never used   Substance Use Topics    Alcohol use:  Yes     Alcohol/week: 0.0 standard drinks     Comment: 3-4 glasses wine per day    Drug use: No     Family History   Problem Relation Age of Onset    Breast Cancer Mother     Cancer Father         kidney cancer    Stroke Father     Heart Disease Father         2-3 vessel bypass    Arthritis Father     No Known Problems Sister     Arthritis Brother     Cancer Brother         prostate cancer    High Blood Pressure Brother     Depression Daughter     Other Daughter         fibromyalgia    Thyroid Disease Daughter     Sleep Apnea Daughter     Colon Cancer Neg Hx      Seasonal and Azithromycin  Allergies   Allergen Reactions    Seasonal Other (See Comments)     Sinus sx    Azithromycin Rash     Current Outpatient Medications   Medication Sig Dispense Refill    neomycin (MYCIFRADIN) 500 MG tablet Take 2 tabs po at 1 PM, 4 PM and 10 PM the day before colon surgery 6 tablet 0    potassium chloride (KLOR-CON M) 20 MEQ extended release tablet TAKE 1 PO TID THE DAY BEFORE SURGERY 3 tablet 0    CPAP Machine MISC by Does not apply route New CPAP with 5 and and CPAP supply. 1 each 0    metoprolol succinate (TOPROL XL) 50 MG extended release tablet Take 1 tablet by mouth daily 90 tablet 3    lisinopril (PRINIVIL;ZESTRIL) 10 MG tablet Take 1 tablet by mouth daily (Patient taking differently: Take 20 mg by mouth daily ) 90 tablet 3    citalopram (CELEXA) 10 MG tablet Take 1 tablet by mouth daily 90 tablet 3    amLODIPine (NORVASC) 5 MG tablet Take 1 tablet by mouth daily 90 tablet 3    rosuvastatin (CRESTOR) 5 MG tablet Take 1 tablet by mouth daily 90 tablet 3    Respiratory Therapy Supplies SHELIA New CPAP mask and supplies 1 Device 0    CPAP Machine MISC by Does not apply route      aspirin 81 MG EC tablet Take 1 tablet by mouth 2 times daily 60 tablet 0     No current facility-administered medications for this visit. /82   Temp 97.6 °F (36.4 °C)   Ht 6' (1.829 m)   Wt 215 lb (97.5 kg)   BMI 29.16 kg/m²     Objective   Physical Exam  Constitutional:       General: He is not in acute distress. Appearance: Normal appearance. HENT:      Mouth/Throat:      Mouth: Mucous membranes are moist.      Pharynx: Oropharynx is clear. Eyes:      Pupils: Pupils are equal, round, and reactive to light. Neck:      Comments: Neck is supple without any masses, no thyromegaly, trachea midline  Cardiovascular:      Rate and Rhythm: Normal rate. Heart sounds: No murmur heard. Pulmonary:      Effort: Pulmonary effort is normal. No respiratory distress. Breath sounds: Normal breath sounds. Abdominal:      Palpations: There is no hepatomegaly or splenomegaly. Tenderness: There is no abdominal tenderness. Hernia: No hernia is present.    Musculoskeletal:      Comments: Normal gait Skin:     Findings: No bruising, lesion or rash. Neurological:      Mental Status: He is alert and oriented to person, place, and time. Psychiatric:         Mood and Affect: Mood normal.         Judgment: Judgment normal.         /82   Temp 97.6 °F (36.4 °C)   Ht 6' (1.829 m)   Wt 215 lb (97.5 kg)   BMI 29.16 kg/m²     An electronic signature was used to authenticate this note.     --Ramesh Ross MD

## 2022-04-01 NOTE — H&P (VIEW-ONLY)
Jose Eduardo Alfaro (:  1950) is a 70 y.o. male, established patient, here for evaluation of the following chief complaint(s): Other (review CT scan )         ASSESSMENT/PLAN:  Cecal polyp        right hemicolectomy with possible colostomy  I have discussed with the patient and his wife the CAT scan findings as well as the pathology report. The options of therapy(surgical and non surgical) were discussed with the patient and family. The procedure of colon resection with possible colostomy was discussed. The potential risks and complications including but not exclusive to infection(including anastomotic leakage and intra abdominal abscess), blood loss needing transfusions, wound problems, blood clots and other organ injury( including ureter damage) were discussed. The patient understands and all questions were answered to their satisfaction. The patient is agreeable to proceed with surgery. Surgery will be scheduled at the patient's convience. An epidural to supplement general anesthesia was discussed. Pre operative clearance is not needed. Blood thinners will be stopped prior to surgery. .  The patient was counseled at length about the risks of alivia Covid-19 in the perioperative period and any recovery window from their procedure. The patient was made aware that alivia Covid-19  may worsen their prognosis for recovering from their procedure  and lend to a higher morbidity and/or mortality risk. The patient was given the options of postponing their procedure. All material risks, benefits, and alternatives were discussed. The patient does wish to proceed with the procedure at this time. Please note this report has been partially produced using speech recognition software and may cause contain errors related to that system including grammar, punctuation and spelling as well as words and phrases that may seem inappropriate.  If there are questions or concerns please feel free to contact me to clarify       Subjective   SUBJECTIVE/OBJECTIVE:  Kristin Bradley is a 70 y.o. male who is seen in follow-up for an abnormal colonoscopy. The colonoscopy showed an abnormality in the the cecum A mass was present. The biopsy shows tubular adenoma. Complete colonoscopy to the cecum was done. A stricture/obstruction was not found. Patient does not have symptoms. He has a history of colon polyps in the past.  The patient does not have a family/personel history of colon cancer. Patient is not on plavix/coumadin/pradaxa/aspirin. A CT scan done on 3/25/2022 shows circumferential wall thickening of the cecum. Ksenia Cantu He is here with his wife. I have reviewed his old records and testing. Review of Systems   Constitutional: Negative for activity change, appetite change and unexpected weight change. HENT: Negative for congestion, nosebleeds, postnasal drip, rhinorrhea and sneezing. Eyes: Negative. Negative for visual disturbance. Respiratory: Negative for chest tightness and shortness of breath. Cardiovascular: Negative for chest pain and leg swelling. Gastrointestinal: Negative for abdominal distention, abdominal pain, blood in stool and constipation. Endocrine: Negative. Genitourinary: Negative for difficulty urinating. Musculoskeletal: Positive for arthralgias. Negative for gait problem and myalgias. Skin: Negative for color change. Allergic/Immunologic: Negative. Neurological: Negative for dizziness, light-headedness, numbness and headaches. Hematological: Does not bruise/bleed easily. Psychiatric/Behavioral: Negative for sleep disturbance.          Past Medical History:   Diagnosis Date    Arthritis     hips    Hyperlipidemia     meds > 15 yrs    Hypertension     meds > 10 yrs    Sleep apnea      Past Surgical History:   Procedure Laterality Date    BACK SURGERY      CATARACT REMOVAL Left 03/08/2017    CERVICAL FUSION  1998    COLONOSCOPY  03/15/2016    polyps diverticilosis, hemorrhoids    COLONOSCOPY      COLONOSCOPY N/A 3/10/2022    COLORECTAL CANCER SCREENING, HIGH RISK performed by Liza Weinstein MD at [facility] with IOL OU    EYE SURGERY      left repair detached retina    EYE SURGERY      right laser for retinal tear    JOINT REPLACEMENT      julio hip replacements    LAMINECTOMY  1988    L4    [place] TOTAL HIP ARTHROPLASTY Right 8/3/2018    RIGHT TOTAL HIP ARTHROPLASTY performed by Bridget Ordonez MD at [address] Left 2018    LEFT TOTAL HIP ARTHROPLASTY, LATERAL DECUB, JOY 62 TM CUP 13 STEM EXTENDED OFFSET 36 HEAD O NECK performed by Bridget Ordonez MD at [address] [facility] chest squamous cell cancer    TONSILLECTOMY      as child     Social History     Tobacco Use    Smoking status: Former Smoker     Packs/day: 1.00     Years: 2.00     Pack years: 2.00     Quit date: 1976     Years since quittin.2    Smokeless tobacco: Never Used    Tobacco comment: smoked at age 25s x 2 yrs / 1ppd   Vaping Use    Vaping Use: Never used   Substance Use Topics    Alcohol use:  Yes     Alcohol/week: 0.0 standard drinks     Comment: 3-4 glasses wine per day    Drug use: No     Family History   Problem Relation Age of Onset    Breast Cancer Mother     Cancer Father         kidney cancer    Stroke Father     Heart Disease Father         2-3 vessel bypass    Arthritis Father     No Known Problems Sister     Arthritis Brother     Cancer Brother         prostate cancer    High Blood Pressure Brother     Depression Daughter     Other Daughter         fibromyalgia    Thyroid Disease Daughter     Sleep Apnea Daughter     Colon Cancer Neg Hx      Seasonal and Azithromycin  Allergies   Allergen Reactions    Seasonal Other (See Comments)     Sinus sx    Azithromycin Rash     Current Outpatient Medications   Medication Sig Dispense Refill    neomycin (MYCIFRADIN) 500 MG tablet Take 2 tabs po at 1 PM, 4 PM and 10 PM the day before colon surgery 6 tablet 0    potassium chloride (KLOR-CON M) 20 MEQ extended release tablet TAKE 1 PO TID THE DAY BEFORE SURGERY 3 tablet 0    CPAP Machine MISC by Does not apply route New CPAP with 5 and and CPAP supply. 1 each 0    metoprolol succinate (TOPROL XL) 50 MG extended release tablet Take 1 tablet by mouth daily 90 tablet 3    lisinopril (PRINIVIL;ZESTRIL) 10 MG tablet Take 1 tablet by mouth daily (Patient taking differently: Take 20 mg by mouth daily ) 90 tablet 3    citalopram (CELEXA) 10 MG tablet Take 1 tablet by mouth daily 90 tablet 3    amLODIPine (NORVASC) 5 MG tablet Take 1 tablet by mouth daily 90 tablet 3    rosuvastatin (CRESTOR) 5 MG tablet Take 1 tablet by mouth daily 90 tablet 3    Respiratory Therapy Supplies SHELIA New CPAP mask and supplies 1 Device 0    CPAP Machine MISC by Does not apply route      aspirin 81 MG EC tablet Take 1 tablet by mouth 2 times daily 60 tablet 0     No current facility-administered medications for this visit. /82   Temp 97.6 °F (36.4 °C)   Ht 6' (1.829 m)   Wt 215 lb (97.5 kg)   BMI 29.16 kg/m²     Objective   Physical Exam  Constitutional:       General: He is not in acute distress. Appearance: Normal appearance. HENT:      Mouth/Throat:      Mouth: Mucous membranes are moist.      Pharynx: Oropharynx is clear. Eyes:      Pupils: Pupils are equal, round, and reactive to light. Neck:      Comments: Neck is supple without any masses, no thyromegaly, trachea midline  Cardiovascular:      Rate and Rhythm: Normal rate. Heart sounds: No murmur heard. Pulmonary:      Effort: Pulmonary effort is normal. No respiratory distress. Breath sounds: Normal breath sounds. Abdominal:      Palpations: There is no hepatomegaly or splenomegaly. Tenderness: There is no abdominal tenderness. Hernia: No hernia is present.    Musculoskeletal:      Comments: Normal gait Skin:     Findings: No bruising, lesion or rash. Neurological:      Mental Status: He is alert and oriented to person, place, and time. Psychiatric:         Mood and Affect: Mood normal.         Judgment: Judgment normal.         /82   Temp 97.6 °F (36.4 °C)   Ht 6' (1.829 m)   Wt 215 lb (97.5 kg)   BMI 29.16 kg/m²     An electronic signature was used to authenticate this note.     --Ely Blackwood MD

## 2022-04-01 NOTE — H&P
Mery Clifford (:  1950) is a 70 y.o. male, established patient, here for evaluation of the following chief complaint(s): Other (review CT scan )         ASSESSMENT/PLAN:  Cecal polyp        right hemicolectomy with possible colostomy  I have discussed with the patient and his wife the CAT scan findings as well as the pathology report. The options of therapy(surgical and non surgical) were discussed with the patient and family. The procedure of colon resection with possible colostomy was discussed. The potential risks and complications including but not exclusive to infection(including anastomotic leakage and intra abdominal abscess), blood loss needing transfusions, wound problems, blood clots and other organ injury( including ureter damage) were discussed. The patient understands and all questions were answered to their satisfaction. The patient is agreeable to proceed with surgery. Surgery will be scheduled at the patient's convience. An epidural to supplement general anesthesia was discussed. Pre operative clearance is not needed. Blood thinners will be stopped prior to surgery. .  The patient was counseled at length about the risks of alivia Covid-19 in the perioperative period and any recovery window from their procedure. The patient was made aware that alivia Covid-19  may worsen their prognosis for recovering from their procedure  and lend to a higher morbidity and/or mortality risk. The patient was given the options of postponing their procedure. All material risks, benefits, and alternatives were discussed. The patient does wish to proceed with the procedure at this time. Please note this report has been partially produced using speech recognition software and may cause contain errors related to that system including grammar, punctuation and spelling as well as words and phrases that may seem inappropriate.  If there are questions or concerns please feel free to contact me to clarify       Subjective   SUBJECTIVE/OBJECTIVE:  Rubén Hoff is a 70 y.o. male who is seen in follow-up for an abnormal colonoscopy. The colonoscopy showed an abnormality in the the cecum A mass was present. The biopsy shows tubular adenoma. Complete colonoscopy to the cecum was done. A stricture/obstruction was not found. Patient does not have symptoms. He has a history of colon polyps in the past.  The patient does not have a family/personel history of colon cancer. Patient is not on plavix/coumadin/pradaxa/aspirin. A CT scan done on 3/25/2022 shows circumferential wall thickening of the cecum. Dillon Brohaley He is here with his wife. I have reviewed his old records and testing. Review of Systems   Constitutional: Negative for activity change, appetite change and unexpected weight change. HENT: Negative for congestion, nosebleeds, postnasal drip, rhinorrhea and sneezing. Eyes: Negative. Negative for visual disturbance. Respiratory: Negative for chest tightness and shortness of breath. Cardiovascular: Negative for chest pain and leg swelling. Gastrointestinal: Negative for abdominal distention, abdominal pain, blood in stool and constipation. Endocrine: Negative. Genitourinary: Negative for difficulty urinating. Musculoskeletal: Positive for arthralgias. Negative for gait problem and myalgias. Skin: Negative for color change. Allergic/Immunologic: Negative. Neurological: Negative for dizziness, light-headedness, numbness and headaches. Hematological: Does not bruise/bleed easily. Psychiatric/Behavioral: Negative for sleep disturbance.          Past Medical History:   Diagnosis Date    Arthritis     hips    Hyperlipidemia     meds > 15 yrs    Hypertension     meds > 10 yrs    Sleep apnea      Past Surgical History:   Procedure Laterality Date    BACK SURGERY      CATARACT REMOVAL Left 03/08/2017    CERVICAL FUSION  1998    COLONOSCOPY  03/15/2016    polyps diverticilosis, hemorrhoids    COLONOSCOPY      COLONOSCOPY N/A 3/10/2022    COLORECTAL CANCER SCREENING, HIGH RISK performed by Faith Collins MD at [address] [place] [address] with IOL OU    EYE SURGERY      left repair detached retina    EYE SURGERY      right laser for retinal tear    JOINT REPLACEMENT      julio hip replacements    LAMINECTOMY      L4    [place] TOTAL HIP ARTHROPLASTY Right 8/3/2018    RIGHT TOTAL HIP ARTHROPLASTY performed by Yue Hardin MD at [facility] Left 2018    LEFT TOTAL HIP ARTHROPLASTY, LATERAL DECUB, JOY 62 TM CUP 13 STEM EXTENDED OFFSET 36 HEAD O NECK performed by Yue Hardin MD at [address] [place] [address] chest squamous cell cancer    TONSILLECTOMY      as child     Social History     Tobacco Use    Smoking status: Former Smoker     Packs/day: 1.00     Years: 2.00     Pack years: 2.00     Quit date: 1976     Years since quittin.2    Smokeless tobacco: Never Used    Tobacco comment: smoked at age 25s x 2 yrs / 1ppd   Vaping Use    Vaping Use: Never used   Substance Use Topics    Alcohol use:  Yes     Alcohol/week: 0.0 standard drinks     Comment: 3-4 glasses wine per day    Drug use: No     Family History   Problem Relation Age of Onset    Breast Cancer Mother     Cancer Father         kidney cancer    Stroke Father     Heart Disease Father         2-3 vessel bypass    Arthritis Father     No Known Problems Sister     Arthritis Brother     Cancer Brother         prostate cancer    High Blood Pressure Brother     Depression Daughter     Other Daughter         fibromyalgia    Thyroid Disease Daughter     Sleep Apnea Daughter     Colon Cancer Neg Hx      Seasonal and Azithromycin  Allergies   Allergen Reactions    Seasonal Other (See Comments)     Sinus sx    Azithromycin Rash     Current Outpatient Medications   Medication Sig Dispense Refill    neomycin (MYCIFRADIN) 500 MG tablet Take 2 tabs po at 1 PM, 4 PM and 10 PM the day before colon surgery 6 tablet 0    potassium chloride (KLOR-CON M) 20 MEQ extended release tablet TAKE 1 PO TID THE DAY BEFORE SURGERY 3 tablet 0    CPAP Machine MISC by Does not apply route New CPAP with 5 and and CPAP supply. 1 each 0    metoprolol succinate (TOPROL XL) 50 MG extended release tablet Take 1 tablet by mouth daily 90 tablet 3    lisinopril (PRINIVIL;ZESTRIL) 10 MG tablet Take 1 tablet by mouth daily (Patient taking differently: Take 20 mg by mouth daily ) 90 tablet 3    citalopram (CELEXA) 10 MG tablet Take 1 tablet by mouth daily 90 tablet 3    amLODIPine (NORVASC) 5 MG tablet Take 1 tablet by mouth daily 90 tablet 3    rosuvastatin (CRESTOR) 5 MG tablet Take 1 tablet by mouth daily 90 tablet 3    Respiratory Therapy Supplies SHELIA New CPAP mask and supplies 1 Device 0    CPAP Machine MISC by Does not apply route      aspirin 81 MG EC tablet Take 1 tablet by mouth 2 times daily 60 tablet 0     No current facility-administered medications for this visit. /82   Temp 97.6 °F (36.4 °C)   Ht 6' (1.829 m)   Wt 215 lb (97.5 kg)   BMI 29.16 kg/m²     Objective   Physical Exam  Constitutional:       General: He is not in acute distress. Appearance: Normal appearance. HENT:      Mouth/Throat:      Mouth: Mucous membranes are moist.      Pharynx: Oropharynx is clear. Eyes:      Pupils: Pupils are equal, round, and reactive to light. Neck:      Comments: Neck is supple without any masses, no thyromegaly, trachea midline  Cardiovascular:      Rate and Rhythm: Normal rate. Heart sounds: No murmur heard. Pulmonary:      Effort: Pulmonary effort is normal. No respiratory distress. Breath sounds: Normal breath sounds. Abdominal:      Palpations: There is no hepatomegaly or splenomegaly. Tenderness: There is no abdominal tenderness. Hernia: No hernia is present.    Musculoskeletal:      Comments: Normal gait Skin:     Findings: No bruising, lesion or rash. Neurological:      Mental Status: He is alert and oriented to person, place, and time. Psychiatric:         Mood and Affect: Mood normal.         Judgment: Judgment normal.         /82   Temp 97.6 °F (36.4 °C)   Ht 6' (1.829 m)   Wt 215 lb (97.5 kg)   BMI 29.16 kg/m²     An electronic signature was used to authenticate this note.     --Candace Marin MD

## 2022-04-12 ENCOUNTER — HOSPITAL ENCOUNTER (OUTPATIENT)
Dept: PREADMISSION TESTING | Age: 72
Discharge: HOME | End: 2022-04-16
Payer: MEDICARE

## 2022-04-12 VITALS
OXYGEN SATURATION: 96 % | BODY MASS INDEX: 29.47 KG/M2 | WEIGHT: 217.6 LBS | DIASTOLIC BLOOD PRESSURE: 80 MMHG | HEIGHT: 72 IN | RESPIRATION RATE: 16 BRPM | TEMPERATURE: 98 F | HEART RATE: 85 BPM | SYSTOLIC BLOOD PRESSURE: 165 MMHG

## 2022-04-12 LAB
ABO/RH: NORMAL
ANION GAP SERPL CALCULATED.3IONS-SCNC: 12 MEQ/L (ref 9–15)
ANTIBODY SCREEN: NORMAL
BUN BLDV-MCNC: 18 MG/DL (ref 8–23)
CALCIUM SERPL-MCNC: 9.8 MG/DL (ref 8.5–9.9)
CHLORIDE BLD-SCNC: 102 MEQ/L (ref 95–107)
CO2: 22 MEQ/L (ref 20–31)
CREAT SERPL-MCNC: 0.74 MG/DL (ref 0.7–1.2)
EKG ATRIAL RATE: 72 BPM
EKG P AXIS: 37 DEGREES
EKG P-R INTERVAL: 140 MS
EKG Q-T INTERVAL: 414 MS
EKG QRS DURATION: 88 MS
EKG QTC CALCULATION (BAZETT): 453 MS
EKG R AXIS: -10 DEGREES
EKG T AXIS: 43 DEGREES
EKG VENTRICULAR RATE: 72 BPM
GFR AFRICAN AMERICAN: >60
GFR NON-AFRICAN AMERICAN: >60
GLUCOSE BLD-MCNC: 100 MG/DL (ref 70–99)
HCT VFR BLD CALC: 38.6 % (ref 42–52)
HEMOGLOBIN: 13 G/DL (ref 14–18)
MCH RBC QN AUTO: 31.3 PG (ref 27–31.3)
MCHC RBC AUTO-ENTMCNC: 33.7 % (ref 33–37)
MCV RBC AUTO: 93 FL (ref 80–100)
PDW BLD-RTO: 13.8 % (ref 11.5–14.5)
PLATELET # BLD: 140 K/UL (ref 130–400)
POTASSIUM SERPL-SCNC: 4.6 MEQ/L (ref 3.4–4.9)
RBC # BLD: 4.15 M/UL (ref 4.7–6.1)
SODIUM BLD-SCNC: 136 MEQ/L (ref 135–144)
WBC # BLD: 8.5 K/UL (ref 4.8–10.8)

## 2022-04-12 PROCEDURE — 86901 BLOOD TYPING SEROLOGIC RH(D): CPT

## 2022-04-12 PROCEDURE — 93005 ELECTROCARDIOGRAM TRACING: CPT | Performed by: SURGERY

## 2022-04-12 PROCEDURE — 80048 BASIC METABOLIC PNL TOTAL CA: CPT

## 2022-04-12 PROCEDURE — 86850 RBC ANTIBODY SCREEN: CPT

## 2022-04-12 PROCEDURE — 85027 COMPLETE CBC AUTOMATED: CPT

## 2022-04-12 PROCEDURE — 86900 BLOOD TYPING SEROLOGIC ABO: CPT

## 2022-04-12 RX ORDER — KETOROLAC TROMETHAMINE 30 MG/ML
30 INJECTION, SOLUTION INTRAMUSCULAR; INTRAVENOUS ONCE
Status: CANCELLED | OUTPATIENT
Start: 2022-04-19 | End: 2022-04-23

## 2022-04-12 RX ORDER — SODIUM CHLORIDE 0.9 % (FLUSH) 0.9 %
10 SYRINGE (ML) INJECTION PRN
Status: CANCELLED | OUTPATIENT
Start: 2022-04-19

## 2022-04-12 RX ORDER — SODIUM CHLORIDE 0.9 % (FLUSH) 0.9 %
10 SYRINGE (ML) INJECTION EVERY 12 HOURS SCHEDULED
Status: CANCELLED | OUTPATIENT
Start: 2022-04-19

## 2022-04-12 RX ORDER — LIDOCAINE HYDROCHLORIDE 10 MG/ML
1 INJECTION, SOLUTION EPIDURAL; INFILTRATION; INTRACAUDAL; PERINEURAL
Status: CANCELLED | OUTPATIENT
Start: 2022-04-19 | End: 2022-04-19

## 2022-04-12 RX ORDER — SODIUM CHLORIDE, SODIUM LACTATE, POTASSIUM CHLORIDE, CALCIUM CHLORIDE 600; 310; 30; 20 MG/100ML; MG/100ML; MG/100ML; MG/100ML
INJECTION, SOLUTION INTRAVENOUS CONTINUOUS
Status: CANCELLED | OUTPATIENT
Start: 2022-04-19

## 2022-04-12 RX ORDER — SODIUM CHLORIDE 9 MG/ML
25 INJECTION, SOLUTION INTRAVENOUS PRN
Status: CANCELLED | OUTPATIENT
Start: 2022-04-19

## 2022-04-18 ENCOUNTER — ANESTHESIA EVENT (OUTPATIENT)
Dept: OPERATING ROOM | Age: 72
DRG: 330 | End: 2022-04-18
Payer: MEDICARE

## 2022-04-19 ENCOUNTER — HOSPITAL ENCOUNTER (INPATIENT)
Age: 72
LOS: 3 days | Discharge: HOME | DRG: 330 | End: 2022-04-22
Attending: SURGERY | Admitting: SURGERY
Payer: MEDICARE

## 2022-04-19 ENCOUNTER — ANESTHESIA (OUTPATIENT)
Dept: OPERATING ROOM | Age: 72
DRG: 330 | End: 2022-04-19
Payer: MEDICARE

## 2022-04-19 VITALS — DIASTOLIC BLOOD PRESSURE: 73 MMHG | SYSTOLIC BLOOD PRESSURE: 131 MMHG | OXYGEN SATURATION: 96 %

## 2022-04-19 DIAGNOSIS — K63.89 CECUM MASS: Primary | ICD-10-CM

## 2022-04-19 LAB
ALBUMIN SERPL-MCNC: 3.9 G/DL (ref 3.5–4.6)
ALP BLD-CCNC: 51 U/L (ref 35–104)
ALT SERPL-CCNC: 17 U/L (ref 0–41)
ANION GAP SERPL CALCULATED.3IONS-SCNC: 10 MEQ/L (ref 9–15)
AST SERPL-CCNC: 25 U/L (ref 0–40)
BASOPHILS ABSOLUTE: 0 K/UL (ref 0–0.2)
BASOPHILS RELATIVE PERCENT: 0.3 %
BILIRUB SERPL-MCNC: 0.6 MG/DL (ref 0.2–0.7)
BUN BLDV-MCNC: 12 MG/DL (ref 8–23)
CALCIUM SERPL-MCNC: 8.9 MG/DL (ref 8.5–9.9)
CHLORIDE BLD-SCNC: 102 MEQ/L (ref 95–107)
CO2: 20 MEQ/L (ref 20–31)
CREAT SERPL-MCNC: 0.73 MG/DL (ref 0.7–1.2)
EOSINOPHILS ABSOLUTE: 0 K/UL (ref 0–0.7)
EOSINOPHILS RELATIVE PERCENT: 0.1 %
GFR AFRICAN AMERICAN: >60
GFR NON-AFRICAN AMERICAN: >60
GLOBULIN: 2.6 G/DL (ref 2.3–3.5)
GLUCOSE BLD-MCNC: 125 MG/DL (ref 70–99)
HCT VFR BLD CALC: 37.5 % (ref 42–52)
HEMOGLOBIN: 12.4 G/DL (ref 14–18)
LYMPHOCYTES ABSOLUTE: 0.5 K/UL (ref 1–4.8)
LYMPHOCYTES RELATIVE PERCENT: 6 %
MAGNESIUM: 2.1 MG/DL (ref 1.7–2.4)
MCH RBC QN AUTO: 31.4 PG (ref 27–31.3)
MCHC RBC AUTO-ENTMCNC: 33.1 % (ref 33–37)
MCV RBC AUTO: 94.7 FL (ref 80–100)
MONOCYTES ABSOLUTE: 0.2 K/UL (ref 0.2–0.8)
MONOCYTES RELATIVE PERCENT: 2.8 %
NEUTROPHILS ABSOLUTE: 7.8 K/UL (ref 1.4–6.5)
NEUTROPHILS RELATIVE PERCENT: 90.8 %
PDW BLD-RTO: 13.2 % (ref 11.5–14.5)
PHOSPHORUS: 3.5 MG/DL (ref 2.3–4.8)
PLATELET # BLD: 165 K/UL (ref 130–400)
POTASSIUM SERPL-SCNC: 4.5 MEQ/L (ref 3.4–4.9)
RBC # BLD: 3.96 M/UL (ref 4.7–6.1)
SODIUM BLD-SCNC: 132 MEQ/L (ref 135–144)
TOTAL PROTEIN: 6.5 G/DL (ref 6.3–8)
WBC # BLD: 8.6 K/UL (ref 4.8–10.8)

## 2022-04-19 PROCEDURE — 2500000003 HC RX 250 WO HCPCS: Performed by: NURSE PRACTITIONER

## 2022-04-19 PROCEDURE — 2720000010 HC SURG SUPPLY STERILE: Performed by: SURGERY

## 2022-04-19 PROCEDURE — 85025 COMPLETE CBC W/AUTO DIFF WBC: CPT

## 2022-04-19 PROCEDURE — 1210000000 HC MED SURG R&B

## 2022-04-19 PROCEDURE — 7100000000 HC PACU RECOVERY - FIRST 15 MIN: Performed by: SURGERY

## 2022-04-19 PROCEDURE — 2580000003 HC RX 258: Performed by: SURGERY

## 2022-04-19 PROCEDURE — 6360000002 HC RX W HCPCS: Performed by: REGISTERED NURSE

## 2022-04-19 PROCEDURE — 6360000002 HC RX W HCPCS: Performed by: SURGERY

## 2022-04-19 PROCEDURE — 2709999900 HC NON-CHARGEABLE SUPPLY: Performed by: SURGERY

## 2022-04-19 PROCEDURE — 3600000014 HC SURGERY LEVEL 4 ADDTL 15MIN: Performed by: SURGERY

## 2022-04-19 PROCEDURE — 7100000001 HC PACU RECOVERY - ADDTL 15 MIN: Performed by: SURGERY

## 2022-04-19 PROCEDURE — 2500000003 HC RX 250 WO HCPCS: Performed by: INTERNAL MEDICINE

## 2022-04-19 PROCEDURE — 6360000002 HC RX W HCPCS: Performed by: INTERNAL MEDICINE

## 2022-04-19 PROCEDURE — 2580000003 HC RX 258: Performed by: ANESTHESIOLOGY

## 2022-04-19 PROCEDURE — 3600000004 HC SURGERY LEVEL 4 BASE: Performed by: SURGERY

## 2022-04-19 PROCEDURE — 3700000000 HC ANESTHESIA ATTENDED CARE: Performed by: SURGERY

## 2022-04-19 PROCEDURE — 6370000000 HC RX 637 (ALT 250 FOR IP): Performed by: SURGERY

## 2022-04-19 PROCEDURE — 2580000003 HC RX 258: Performed by: NURSE PRACTITIONER

## 2022-04-19 PROCEDURE — 2580000003 HC RX 258: Performed by: INTERNAL MEDICINE

## 2022-04-19 PROCEDURE — 84100 ASSAY OF PHOSPHORUS: CPT

## 2022-04-19 PROCEDURE — 88329 PATH CONSLTJ DRG SURG: CPT

## 2022-04-19 PROCEDURE — 94150 VITAL CAPACITY TEST: CPT

## 2022-04-19 PROCEDURE — 0DTF0ZZ RESECTION OF RIGHT LARGE INTESTINE, OPEN APPROACH: ICD-10-PCS | Performed by: SURGERY

## 2022-04-19 PROCEDURE — 83735 ASSAY OF MAGNESIUM: CPT

## 2022-04-19 PROCEDURE — 6360000002 HC RX W HCPCS: Performed by: ANESTHESIOLOGY

## 2022-04-19 PROCEDURE — 2500000003 HC RX 250 WO HCPCS: Performed by: REGISTERED NURSE

## 2022-04-19 PROCEDURE — 2500000003 HC RX 250 WO HCPCS: Performed by: SURGERY

## 2022-04-19 PROCEDURE — 80053 COMPREHEN METABOLIC PANEL: CPT

## 2022-04-19 PROCEDURE — 62325 NJX INTERLAMINAR CRV/THRC: CPT | Performed by: ANESTHESIOLOGY

## 2022-04-19 PROCEDURE — 44160 REMOVAL OF COLON: CPT | Performed by: SURGERY

## 2022-04-19 PROCEDURE — 3700000001 HC ADD 15 MINUTES (ANESTHESIA): Performed by: SURGERY

## 2022-04-19 PROCEDURE — 88307 TISSUE EXAM BY PATHOLOGIST: CPT

## 2022-04-19 PROCEDURE — 36415 COLL VENOUS BLD VENIPUNCTURE: CPT

## 2022-04-19 RX ORDER — METOPROLOL SUCCINATE 50 MG/1
50 TABLET, EXTENDED RELEASE ORAL DAILY
Status: DISCONTINUED | OUTPATIENT
Start: 2022-04-20 | End: 2022-04-22 | Stop reason: HOSPADM

## 2022-04-19 RX ORDER — BUPIVACAINE HYDROCHLORIDE 2.5 MG/ML
INJECTION, SOLUTION EPIDURAL; INFILTRATION; INTRACAUDAL PRN
Status: DISCONTINUED | OUTPATIENT
Start: 2022-04-19 | End: 2022-04-19 | Stop reason: SDUPTHER

## 2022-04-19 RX ORDER — METOCLOPRAMIDE HYDROCHLORIDE 5 MG/ML
5 INJECTION INTRAMUSCULAR; INTRAVENOUS EVERY 8 HOURS
Status: DISCONTINUED | OUTPATIENT
Start: 2022-04-19 | End: 2022-04-22 | Stop reason: HOSPADM

## 2022-04-19 RX ORDER — DEXAMETHASONE SODIUM PHOSPHATE 10 MG/ML
INJECTION INTRAMUSCULAR; INTRAVENOUS PRN
Status: DISCONTINUED | OUTPATIENT
Start: 2022-04-19 | End: 2022-04-19 | Stop reason: SDUPTHER

## 2022-04-19 RX ORDER — SODIUM CHLORIDE 0.9 % (FLUSH) 0.9 %
5-40 SYRINGE (ML) INJECTION PRN
Status: DISCONTINUED | OUTPATIENT
Start: 2022-04-19 | End: 2022-04-22 | Stop reason: HOSPADM

## 2022-04-19 RX ORDER — BUPIVACAINE HYDROCHLORIDE 2.5 MG/ML
INJECTION, SOLUTION EPIDURAL; INFILTRATION; INTRACAUDAL PRN
Status: DISCONTINUED | OUTPATIENT
Start: 2022-04-19 | End: 2022-04-19

## 2022-04-19 RX ORDER — SODIUM CHLORIDE 9 MG/ML
25 INJECTION, SOLUTION INTRAVENOUS PRN
Status: DISCONTINUED | OUTPATIENT
Start: 2022-04-19 | End: 2022-04-19 | Stop reason: HOSPADM

## 2022-04-19 RX ORDER — LORAZEPAM 1 MG/1
1 TABLET ORAL
Status: DISCONTINUED | OUTPATIENT
Start: 2022-04-19 | End: 2022-04-22 | Stop reason: HOSPADM

## 2022-04-19 RX ORDER — ONDANSETRON 2 MG/ML
INJECTION INTRAMUSCULAR; INTRAVENOUS PRN
Status: DISCONTINUED | OUTPATIENT
Start: 2022-04-19 | End: 2022-04-19 | Stop reason: SDUPTHER

## 2022-04-19 RX ORDER — LORAZEPAM 2 MG/ML
3 INJECTION INTRAMUSCULAR
Status: DISCONTINUED | OUTPATIENT
Start: 2022-04-19 | End: 2022-04-22 | Stop reason: HOSPADM

## 2022-04-19 RX ORDER — MIDAZOLAM HYDROCHLORIDE 1 MG/ML
INJECTION INTRAMUSCULAR; INTRAVENOUS PRN
Status: DISCONTINUED | OUTPATIENT
Start: 2022-04-19 | End: 2022-04-19 | Stop reason: SDUPTHER

## 2022-04-19 RX ORDER — KETOROLAC TROMETHAMINE 30 MG/ML
30 INJECTION, SOLUTION INTRAMUSCULAR; INTRAVENOUS ONCE
Status: COMPLETED | OUTPATIENT
Start: 2022-04-19 | End: 2022-04-19

## 2022-04-19 RX ORDER — LIDOCAINE HYDROCHLORIDE 10 MG/ML
1 INJECTION, SOLUTION EPIDURAL; INFILTRATION; INTRACAUDAL; PERINEURAL
Status: COMPLETED | OUTPATIENT
Start: 2022-04-19 | End: 2022-04-19

## 2022-04-19 RX ORDER — LORAZEPAM 1 MG/1
3 TABLET ORAL
Status: DISCONTINUED | OUTPATIENT
Start: 2022-04-19 | End: 2022-04-22 | Stop reason: HOSPADM

## 2022-04-19 RX ORDER — ONDANSETRON 2 MG/ML
4 INJECTION INTRAMUSCULAR; INTRAVENOUS EVERY 6 HOURS PRN
Status: DISCONTINUED | OUTPATIENT
Start: 2022-04-19 | End: 2022-04-22 | Stop reason: HOSPADM

## 2022-04-19 RX ORDER — SODIUM CHLORIDE 0.9 % (FLUSH) 0.9 %
10 SYRINGE (ML) INJECTION PRN
Status: DISCONTINUED | OUTPATIENT
Start: 2022-04-19 | End: 2022-04-19 | Stop reason: HOSPADM

## 2022-04-19 RX ORDER — LORAZEPAM 2 MG/ML
1 INJECTION INTRAMUSCULAR
Status: DISCONTINUED | OUTPATIENT
Start: 2022-04-19 | End: 2022-04-22 | Stop reason: HOSPADM

## 2022-04-19 RX ORDER — LORAZEPAM 1 MG/1
4 TABLET ORAL
Status: DISCONTINUED | OUTPATIENT
Start: 2022-04-19 | End: 2022-04-22 | Stop reason: HOSPADM

## 2022-04-19 RX ORDER — SODIUM CHLORIDE, SODIUM LACTATE, POTASSIUM CHLORIDE, CALCIUM CHLORIDE 600; 310; 30; 20 MG/100ML; MG/100ML; MG/100ML; MG/100ML
INJECTION, SOLUTION INTRAVENOUS CONTINUOUS
Status: DISCONTINUED | OUTPATIENT
Start: 2022-04-19 | End: 2022-04-19

## 2022-04-19 RX ORDER — ROCURONIUM BROMIDE 10 MG/ML
INJECTION, SOLUTION INTRAVENOUS PRN
Status: DISCONTINUED | OUTPATIENT
Start: 2022-04-19 | End: 2022-04-19 | Stop reason: SDUPTHER

## 2022-04-19 RX ORDER — LORAZEPAM 1 MG/1
2 TABLET ORAL
Status: DISCONTINUED | OUTPATIENT
Start: 2022-04-19 | End: 2022-04-22 | Stop reason: HOSPADM

## 2022-04-19 RX ORDER — DEXTROSE, SODIUM CHLORIDE, AND POTASSIUM CHLORIDE 5; .45; .15 G/100ML; G/100ML; G/100ML
INJECTION INTRAVENOUS CONTINUOUS
Status: DISCONTINUED | OUTPATIENT
Start: 2022-04-19 | End: 2022-04-22 | Stop reason: HOSPADM

## 2022-04-19 RX ORDER — SODIUM CHLORIDE 0.9 % (FLUSH) 0.9 %
10 SYRINGE (ML) INJECTION EVERY 12 HOURS SCHEDULED
Status: DISCONTINUED | OUTPATIENT
Start: 2022-04-19 | End: 2022-04-19 | Stop reason: HOSPADM

## 2022-04-19 RX ORDER — CITALOPRAM 10 MG/1
10 TABLET ORAL DAILY
Status: DISCONTINUED | OUTPATIENT
Start: 2022-04-19 | End: 2022-04-22 | Stop reason: HOSPADM

## 2022-04-19 RX ORDER — NALOXONE HYDROCHLORIDE 0.4 MG/ML
0.4 INJECTION, SOLUTION INTRAMUSCULAR; INTRAVENOUS; SUBCUTANEOUS PRN
Status: DISCONTINUED | OUTPATIENT
Start: 2022-04-19 | End: 2022-04-22 | Stop reason: HOSPADM

## 2022-04-19 RX ORDER — AMLODIPINE BESYLATE 5 MG/1
5 TABLET ORAL DAILY
Status: DISCONTINUED | OUTPATIENT
Start: 2022-04-19 | End: 2022-04-22 | Stop reason: HOSPADM

## 2022-04-19 RX ORDER — LORAZEPAM 2 MG/ML
4 INJECTION INTRAMUSCULAR
Status: DISCONTINUED | OUTPATIENT
Start: 2022-04-19 | End: 2022-04-22 | Stop reason: HOSPADM

## 2022-04-19 RX ORDER — PROPOFOL 10 MG/ML
INJECTION, EMULSION INTRAVENOUS PRN
Status: DISCONTINUED | OUTPATIENT
Start: 2022-04-19 | End: 2022-04-19 | Stop reason: SDUPTHER

## 2022-04-19 RX ORDER — SODIUM CHLORIDE 0.9 % (FLUSH) 0.9 %
5-40 SYRINGE (ML) INJECTION EVERY 12 HOURS SCHEDULED
Status: DISCONTINUED | OUTPATIENT
Start: 2022-04-19 | End: 2022-04-22 | Stop reason: HOSPADM

## 2022-04-19 RX ORDER — THIAMINE HYDROCHLORIDE 100 MG/ML
100 INJECTION, SOLUTION INTRAMUSCULAR; INTRAVENOUS DAILY
Status: DISCONTINUED | OUTPATIENT
Start: 2022-04-19 | End: 2022-04-22 | Stop reason: HOSPADM

## 2022-04-19 RX ORDER — LORAZEPAM 2 MG/ML
2 INJECTION INTRAMUSCULAR
Status: DISCONTINUED | OUTPATIENT
Start: 2022-04-19 | End: 2022-04-22 | Stop reason: HOSPADM

## 2022-04-19 RX ORDER — MAGNESIUM HYDROXIDE 1200 MG/15ML
LIQUID ORAL CONTINUOUS PRN
Status: COMPLETED | OUTPATIENT
Start: 2022-04-19 | End: 2022-04-19

## 2022-04-19 RX ORDER — BISACODYL 10 MG
10 SUPPOSITORY, RECTAL RECTAL DAILY
Status: DISCONTINUED | OUTPATIENT
Start: 2022-04-20 | End: 2022-04-22 | Stop reason: HOSPADM

## 2022-04-19 RX ORDER — LISINOPRIL 10 MG/1
10 TABLET ORAL DAILY
Status: DISCONTINUED | OUTPATIENT
Start: 2022-04-19 | End: 2022-04-22 | Stop reason: HOSPADM

## 2022-04-19 RX ORDER — SODIUM CHLORIDE 9 MG/ML
INJECTION, SOLUTION INTRAVENOUS PRN
Status: DISCONTINUED | OUTPATIENT
Start: 2022-04-19 | End: 2022-04-22 | Stop reason: HOSPADM

## 2022-04-19 RX ORDER — SODIUM CHLORIDE 9 MG/ML
25 INJECTION, SOLUTION INTRAVENOUS PRN
Status: DISCONTINUED | OUTPATIENT
Start: 2022-04-19 | End: 2022-04-22 | Stop reason: HOSPADM

## 2022-04-19 RX ORDER — GLYCOPYRROLATE 1 MG/5 ML
SYRINGE (ML) INTRAVENOUS PRN
Status: DISCONTINUED | OUTPATIENT
Start: 2022-04-19 | End: 2022-04-19 | Stop reason: SDUPTHER

## 2022-04-19 RX ADMIN — PHENYLEPHRINE HYDROCHLORIDE 100 MCG: 10 INJECTION INTRAVENOUS at 08:32

## 2022-04-19 RX ADMIN — THIAMINE HYDROCHLORIDE 100 MG: 100 INJECTION, SOLUTION INTRAMUSCULAR; INTRAVENOUS at 16:19

## 2022-04-19 RX ADMIN — Medication 5 ML: at 20:35

## 2022-04-19 RX ADMIN — METOCLOPRAMIDE HYDROCHLORIDE 5 MG: 5 INJECTION INTRAMUSCULAR; INTRAVENOUS at 20:04

## 2022-04-19 RX ADMIN — PROPOFOL 200 MG: 10 INJECTION, EMULSION INTRAVENOUS at 08:06

## 2022-04-19 RX ADMIN — ONDANSETRON 4 MG: 2 INJECTION INTRAMUSCULAR; INTRAVENOUS at 09:31

## 2022-04-19 RX ADMIN — BUPIVACAINE HYDROCHLORIDE 6 ML: 2.5 INJECTION, SOLUTION EPIDURAL; INFILTRATION; INTRACAUDAL; PERINEURAL at 08:11

## 2022-04-19 RX ADMIN — PHENYLEPHRINE HYDROCHLORIDE 100 MCG: 10 INJECTION INTRAVENOUS at 08:47

## 2022-04-19 RX ADMIN — METOCLOPRAMIDE HYDROCHLORIDE 5 MG: 5 INJECTION INTRAMUSCULAR; INTRAVENOUS at 14:09

## 2022-04-19 RX ADMIN — AMLODIPINE BESYLATE 5 MG: 5 TABLET ORAL at 14:08

## 2022-04-19 RX ADMIN — POTASSIUM CHLORIDE, DEXTROSE MONOHYDRATE AND SODIUM CHLORIDE: 150; 5; 450 INJECTION, SOLUTION INTRAVENOUS at 14:02

## 2022-04-19 RX ADMIN — FOLIC ACID 1 MG: 5 INJECTION, SOLUTION INTRAMUSCULAR; INTRAVENOUS; SUBCUTANEOUS at 20:06

## 2022-04-19 RX ADMIN — ROPIVACAINE HYDROCHLORIDE: 5 INJECTION, SOLUTION EPIDURAL; INFILTRATION; PERINEURAL at 20:31

## 2022-04-19 RX ADMIN — LISINOPRIL 10 MG: 10 TABLET ORAL at 14:08

## 2022-04-19 RX ADMIN — Medication 0.4 MG: at 09:06

## 2022-04-19 RX ADMIN — PHENYLEPHRINE HYDROCHLORIDE 100 MCG: 10 INJECTION INTRAVENOUS at 08:23

## 2022-04-19 RX ADMIN — CEFOXITIN SODIUM 1000 MG: 1 POWDER, FOR SOLUTION INTRAVENOUS at 20:38

## 2022-04-19 RX ADMIN — SUGAMMADEX 200 MG: 100 INJECTION, SOLUTION INTRAVENOUS at 10:05

## 2022-04-19 RX ADMIN — KETOROLAC TROMETHAMINE 30 MG: 30 INJECTION, SOLUTION INTRAMUSCULAR at 06:31

## 2022-04-19 RX ADMIN — CEFOXITIN SODIUM 2000 MG: 2 POWDER, FOR SOLUTION INTRAVENOUS at 08:12

## 2022-04-19 RX ADMIN — LIDOCAINE HYDROCHLORIDE 0.1 ML: 10 INJECTION, SOLUTION EPIDURAL; INFILTRATION; INTRACAUDAL; PERINEURAL at 06:30

## 2022-04-19 RX ADMIN — DEXAMETHASONE SODIUM PHOSPHATE 4 MG: 10 INJECTION INTRAMUSCULAR; INTRAVENOUS at 09:31

## 2022-04-19 RX ADMIN — NALOXEGOL OXALATE 25 MG: 12.5 TABLET, FILM COATED ORAL at 06:25

## 2022-04-19 RX ADMIN — PHENYLEPHRINE HYDROCHLORIDE 100 MCG: 10 INJECTION INTRAVENOUS at 09:11

## 2022-04-19 RX ADMIN — PHENYLEPHRINE HYDROCHLORIDE 100 MCG: 10 INJECTION INTRAVENOUS at 09:18

## 2022-04-19 RX ADMIN — CEFOXITIN SODIUM 1000 MG: 1 POWDER, FOR SOLUTION INTRAVENOUS at 16:31

## 2022-04-19 RX ADMIN — PHENYLEPHRINE HYDROCHLORIDE 100 MCG: 10 INJECTION INTRAVENOUS at 09:26

## 2022-04-19 RX ADMIN — MIDAZOLAM HYDROCHLORIDE 2 MG: 1 INJECTION, SOLUTION INTRAMUSCULAR; INTRAVENOUS at 07:23

## 2022-04-19 RX ADMIN — ROCURONIUM BROMIDE 50 MG: 10 INJECTION INTRAVENOUS at 08:06

## 2022-04-19 RX ADMIN — SODIUM CHLORIDE, POTASSIUM CHLORIDE, SODIUM LACTATE AND CALCIUM CHLORIDE: 600; 310; 30; 20 INJECTION, SOLUTION INTRAVENOUS at 06:30

## 2022-04-19 RX ADMIN — CITALOPRAM HYDROBROMIDE 10 MG: 10 TABLET ORAL at 14:08

## 2022-04-19 RX ADMIN — ROPIVACAINE HYDROCHLORIDE 8 ML/HR: 5 INJECTION, SOLUTION EPIDURAL; INFILTRATION; PERINEURAL at 09:01

## 2022-04-19 ASSESSMENT — PULMONARY FUNCTION TESTS
PIF_VALUE: 16
PIF_VALUE: 18
PIF_VALUE: 16
PIF_VALUE: 17
PIF_VALUE: 3
PIF_VALUE: 16
PIF_VALUE: 15
PIF_VALUE: 17
PIF_VALUE: 0
PIF_VALUE: 15
PIF_VALUE: 2
PIF_VALUE: 15
PIF_VALUE: 17
PIF_VALUE: 16
PIF_VALUE: 15
PIF_VALUE: 17
PIF_VALUE: 15
PIF_VALUE: 3
PIF_VALUE: 1
PIF_VALUE: 18
PIF_VALUE: 17
PIF_VALUE: 3
PIF_VALUE: 16
PIF_VALUE: 17
PIF_VALUE: 16
PIF_VALUE: 15
PIF_VALUE: 1
PIF_VALUE: 18
PIF_VALUE: 15
PIF_VALUE: 15
PIF_VALUE: 5
PIF_VALUE: 17
PIF_VALUE: 17
PIF_VALUE: 15
PIF_VALUE: 17
PIF_VALUE: 1
PIF_VALUE: 15
PIF_VALUE: 15
PIF_VALUE: 17
PIF_VALUE: 17
PIF_VALUE: 2
PIF_VALUE: 16
PIF_VALUE: 17
PIF_VALUE: 17
PIF_VALUE: 15
PIF_VALUE: 17
PIF_VALUE: 17
PIF_VALUE: 1
PIF_VALUE: 17
PIF_VALUE: 15
PIF_VALUE: 15
PIF_VALUE: 17
PIF_VALUE: 15
PIF_VALUE: 5
PIF_VALUE: 3
PIF_VALUE: 17
PIF_VALUE: 17
PIF_VALUE: 16
PIF_VALUE: 15
PIF_VALUE: 16
PIF_VALUE: 1
PIF_VALUE: 18
PIF_VALUE: 18
PIF_VALUE: 17
PIF_VALUE: 15
PIF_VALUE: 17
PIF_VALUE: 17
PIF_VALUE: 15
PIF_VALUE: 16
PIF_VALUE: 16
PIF_VALUE: 18
PIF_VALUE: 17
PIF_VALUE: 4
PIF_VALUE: 15
PIF_VALUE: 18
PIF_VALUE: 17
PIF_VALUE: 17
PIF_VALUE: 15
PIF_VALUE: 16
PIF_VALUE: 16
PIF_VALUE: 15
PIF_VALUE: 18
PIF_VALUE: 15
PIF_VALUE: 16
PIF_VALUE: 17
PIF_VALUE: 15
PIF_VALUE: 17
PIF_VALUE: 15
PIF_VALUE: 16
PIF_VALUE: 17
PIF_VALUE: 15
PIF_VALUE: 15
PIF_VALUE: 17
PIF_VALUE: 0
PIF_VALUE: 26
PIF_VALUE: 17
PIF_VALUE: 15
PIF_VALUE: 1
PIF_VALUE: 16
PIF_VALUE: 15
PIF_VALUE: 15
PIF_VALUE: 0
PIF_VALUE: 17
PIF_VALUE: 17
PIF_VALUE: 15
PIF_VALUE: 16
PIF_VALUE: 17
PIF_VALUE: 16
PIF_VALUE: 2
PIF_VALUE: 16
PIF_VALUE: 17
PIF_VALUE: 17
PIF_VALUE: 16
PIF_VALUE: 26
PIF_VALUE: 15
PIF_VALUE: 17

## 2022-04-19 ASSESSMENT — PAIN SCALES - GENERAL
PAINLEVEL_OUTOF10: 0
PAINLEVEL_OUTOF10: 3

## 2022-04-19 ASSESSMENT — PAIN - FUNCTIONAL ASSESSMENT: PAIN_FUNCTIONAL_ASSESSMENT: 0-10

## 2022-04-19 NOTE — ANESTHESIA PRE PROCEDURE
Department of Anesthesiology  Preprocedure Note       Name:  Nazanin Sin   Age:  70 y.o.  :  1950                                          MRN:  [id number]         Date:  2022      Surgeon: Jody Abad):  Abdifatah Mcwilliams MD    Procedure: Procedure(s):  RIGHT COLECTOMY WITH POSSIBLE COLOSTOMY, EPIDURAL, 2 HRS    Medications prior to admission:   Prior to Admission medications    Medication Sig Start Date End Date Taking? Authorizing Provider   potassium chloride (KLOR-CON M) 20 MEQ extended release tablet TAKE 1 PO TID THE DAY BEFORE SURGERY 3/31/22   Abdifatah Mcwilliams MD   CPAP Machine MISC by Does not apply route New CPAP with 5 and and CPAP supply.  12/15/21   Michael Garcia MD   metoprolol succinate (TOPROL XL) 50 MG extended release tablet Take 1 tablet by mouth daily 19   Ivis Barahona MD   lisinopril (PRINIVIL;ZESTRIL) 10 MG tablet Take 1 tablet by mouth daily  Patient taking differently: Take 20 mg by mouth daily  19   Ivis Barahona MD   citalopram (CELEXA) 10 MG tablet Take 1 tablet by mouth daily 19   Ivis Barahona MD   amLODIPine (NORVASC) 5 MG tablet Take 1 tablet by mouth daily 19   Ivis Barahona MD   rosuvastatin (CRESTOR) 5 MG tablet Take 1 tablet by mouth daily 19   Ivis Barahona MD   aspirin 81 MG EC tablet Take 1 tablet by mouth 2 times daily 11/16/18 3/10/22  BILL Freeman CNP   Respiratory Therapy Supplies SHELIA New CPAP mask and supplies 10/23/18   Michael Garcia MD   CPAP Machine MISC by Does not apply route    Historical Provider, MD       Current medications:    Current Facility-Administered Medications   Medication Dose Route Frequency Provider Last Rate Last Admin    0.9 % sodium chloride infusion  25 mL IntraVENous PRN BILL Dejesus CNP        lactated ringers infusion   IntraVENous Continuous BILL Dejesus  mL/hr at 22 0630 New Bag at 22 0630    sodium chloride flush 0.9 % injection 10 mL  10 mL IntraVENous 2 times per day BILL Yousif CNP        sodium chloride flush 0.9 % injection 10 mL  10 mL IntraVENous PRN BILL Yousif CNP        cefOXitin (MEFOXIN) 2000 mg in dextrose 5% 50 mL (mini-bag)  2,000 mg IntraVENous Once Deepa Rodney MD           Allergies: Allergies   Allergen Reactions    Seasonal Other (See Comments)     Sinus sx    Azithromycin Rash       Problem List:    Patient Active Problem List   Diagnosis Code    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    Arthropathy of cervical facet joint M47.812    MUNA (obstructive sleep apnea) G47.33    Patient overweight E66.3    Onychomycosis B35.1    Pain in both feet M79.671, M79.672    Mixed hyperlipidemia E78.2    Essential hypertension I10    Status post hip replacement, right Z96.641    Degenerative joint disease of left hip M16.12    Constipation K59.00    Status post total hip replacement, left I73.158    Obesity (BMI 30-39. 9) E66.9    Adenomatous polyp of colon D12.6    Cecum mass K63.89       Past Medical History:        Diagnosis Date    Arthritis     hips    Hyperlipidemia     meds > 15 yrs    Hypertension     meds > 10 yrs    Sleep apnea        Past Surgical History:        Procedure Laterality Date    BACK SURGERY      CATARACT REMOVAL Left 03/08/2017    CERVICAL FUSION  1998    COLONOSCOPY  03/15/2016    polyps diverticilosis, hemorrhoids    COLONOSCOPY  2013    COLONOSCOPY N/A 3/10/2022    COLORECTAL CANCER SCREENING, HIGH RISK performed by Jud Dee MD at [address] [place] [address] with IOL OU    EYE SURGERY      left repair detached retina    EYE SURGERY      right laser for retinal tear    JOINT REPLACEMENT      julio hip replacements    LAMINECTOMY  1988    L4    [place] TOTAL HIP ARTHROPLASTY Right 8/3/2018    RIGHT TOTAL HIP ARTHROPLASTY performed by Rosalinda Owens MD at [address] [place] [address] ARTHROPLASTY Left 2018    LEFT TOTAL HIP ARTHROPLASTY, LATERAL DECUB, JOY 62 TM CUP 13 STEM EXTENDED OFFSET 36 HEAD O NECK performed by Yoni Saucedo MD at [facility] chest squamous cell cancer    TONSILLECTOMY      as child       Social History:    Social History     Tobacco Use    Smoking status: Former Smoker     Packs/day: 1.00     Years: 2.00     Pack years: 2.00     Quit date: 1976     Years since quittin.2    Smokeless tobacco: Never Used    Tobacco comment: smoked at age 25s x 2 yrs / 1ppd   Substance Use Topics    Alcohol use: Yes     Alcohol/week: 0.0 standard drinks     Comment: 3-4 glasses wine per day                                Counseling given: Not Answered  Comment: smoked at age 25s x 2 yrs / 1ppd      Vital Signs (Current):   Vitals:    22 0556   BP: (!) 180/87   Pulse: 97   Resp: 16   Temp: 98 °F (36.7 °C)   TempSrc: Temporal   SpO2: 98%   Weight: 210 lb (95.3 kg)   Height: 6' (1.829 m)                                              BP Readings from Last 3 Encounters:   22 (!) 180/87   22 (!) 165/80   22 126/82       NPO Status: Time of last liquid consumption: 2200                        Time of last solid consumption: 1800                        Date of last liquid consumption: 22                        Date of last solid food consumption: 22    BMI:   Wt Readings from Last 3 Encounters:   22 210 lb (95.3 kg)   22 217 lb 9.6 oz (98.7 kg)   22 215 lb (97.5 kg)     Body mass index is 28.48 kg/m².     CBC:   Lab Results   Component Value Date    WBC 8.5 2022    RBC 4.15 2022    HGB 13.0 2022    HCT 38.6 2022    MCV 93.0 2022    RDW 13.8 2022     2022       CMP:   Lab Results   Component Value Date     2022    K 4.6 2022    K 4.3 2018     2022    CO2 22 2022    BUN 18 2022    CREATININE 0.74 04/12/2022    GFRAA >60.0 04/12/2022    LABGLOM >60.0 04/12/2022    GLUCOSE 100 04/12/2022    GLUCOSE 97 05/05/2012    PROT 6.8 07/01/2016    CALCIUM 9.8 04/12/2022    BILITOT 0.5 06/06/2017    ALKPHOS 62 06/06/2017    AST 24 06/06/2017    ALT 19 06/06/2017       POC Tests: No results for input(s): POCGLU, POCNA, POCK, POCCL, POCBUN, POCHEMO, POCHCT in the last 72 hours. Coags:   Lab Results   Component Value Date    PROTIME 11.2 11/01/2018    INR 1.1 11/01/2018       HCG (If Applicable): No results found for: PREGTESTUR, PREGSERUM, HCG, HCGQUANT     ABGs: No results found for: PHART, PO2ART, KDG9AGB, NWZ5QCX, BEART, U8QXFUDA     Type & Screen (If Applicable):  No results found for: LABABO, LABRH    Drug/Infectious Status (If Applicable):  No results found for: HIV, HEPCAB    COVID-19 Screening (If Applicable): No results found for: COVID19        Anesthesia Evaluation    Airway: Mallampati: II  TM distance: >3 FB   Neck ROM: full  Mouth opening: > = 3 FB Dental: normal exam         Pulmonary: breath sounds clear to auscultation  (+) sleep apnea: on CPAP,                             Cardiovascular:    (+) hypertension:,       ECG reviewed  Rhythm: regular             Beta Blocker:  Dose within 24 Hrs         Neuro/Psych:   Negative Neuro/Psych ROS              GI/Hepatic/Renal: Neg GI/Hepatic/Renal ROS            Endo/Other: Negative Endo/Other ROS                    Abdominal:             Vascular: negative vascular ROS. Other Findings:             Anesthesia Plan      general and epidural     ASA 3       Induction: intravenous. MIPS: Prophylactic antiemetics administered. Anesthetic plan and risks discussed with patient. Use of blood products discussed with patient whom consented to blood products. Plan discussed with CRNA.     Attending anesthesiologist reviewed and agrees with Preprocedure content              Nikolas Rivera MD   4/19/2022

## 2022-04-19 NOTE — OP NOTE
[address]                      [address] [facility], [id number] [place] [address]                                OPERATIVE REPORT    PATIENT NAME: Natalie Sears                   :        1950  MED REC NO:   [id number]                            ROOM:       U245  ACCOUNT NO:   [de-identified]                           ADMIT DATE: 2022  PROVIDER:     Patriciaann Denver, MD    DATE OF PROCEDURE:  2022    LOCATION:  [place]. PREOPERATIVE DIAGNOSIS:  Mass of the cecum. POSTOPERATIVE DIAGNOSIS:  Mass of the cecum. PROCEDURE PERFORMED:  Right colectomy. SURGEON:  Patriciaann Denver, MD    ANESTHESIA:  General with epidural.    COMPLICATIONS:  None. ESTIMATED BLOOD LOSS:  Less than 50 mL. HISTORY:  The patient is a 59-year-old male who was found on screening  colonoscopy to have a mass in the at the ileocecal valve area. Biopsies  came back tubular adenoma, but was not resectable by way of colonoscopy. After discussing with the patient his options of therapy, he was  agreeable to colon resection. The procedure of a right colectomy as  well as potential risks and complications including but not exclusive to  infection, blood loss, damage to surrounding structures, anastomotic  leakage, and even the possibility of future of needing further surgery,  if anything were to happen were all discussed. He understood and was  agreeable to the procedure. OPERATIVE PROCEDURE:  The patient was brought in the operative suite,  placed in the supine position. He had been given an epidural in the  holding area. General anesthetic was induced and he was intubated. Afterwards Corral catheter was inserted and the abdomen was prepped and  draped in a sterile fashion with an Ioban drape over the abdomen. Time-out called. The patient and procedure were properly identified.   A  right paramedian incision was then made in the right mid abdomen and  carried down through the subcutaneous tissue to the rectus sheath. The  sheath was opened, the muscle was split, and the abdominal cavity was  entered. Upon entering the abdominal cavity, there were no significant  adhesions or free fluid. On initial examination, I was not able to feel  the mass. An Lg Katia was then placed into the abdominal incision  and Bookwalter retractor was put into place. I then proceeded to  mobilize up the cecum and right colon. In doing so, the appendix  appeared to be normal.  I found no abnormalities on palpation to this  region. I freed up the right colon along the hepatic flexure to the  midtransverse colon. I then transected the terminal ileum approximately  3 inches proximal to the ileocecal valve and transected the transverse  colon to the right of the ileocolic vessels. I then proceeded to take  down the mesentery close to the base using Enseal device. The duodenum  was identified and care was made not to injure. After I had taken down  the mesentery the specimen was taken to Pathology. The pathologist  opened the specimen and found the tumor at the ileocecal valve area. Both margins were free. I then brought the bowel back together  side-to-side, made a fork hole in both and proceeded to do a  side-to-side anastomosis with a LUIS FERNANDO stapler. Then, the fork holes were  closed using a TA stapler. I then reinforced the entire anastomosis  with interrupted 4-0 silk sutures and also close the mesenteric defect  with 2-0 silk. There was a two fingerbreadth anastomosis present. There was minimal blood loss at this point, irrigation was done with  saline solution. No other abnormalities were found in the abdomen.   At  this point, sponge, needle and instrument counts were correct and  closure of the abdomen was then done using 0 Vicryl for the posterior  sheath, number 1 PDS for the anterior sheath, 2-0 and 3-0 Vicryl for the  subcutaneous tissue, and the skin was closed with 4-0 Monocryl with skin  glue on the skin edges and Aquacel dressing on the wound. He tolerated  the procedure well, went to Recovery in stable condition. I spoke with  his family in the consultation room after the procedure.         Elizabeth Muñiz MD    D: 04/19/2022 14:31:02       T: 04/19/2022 14:33:51     [email or web address]  Job#: [id number]     Doc#: [id number]    CC:

## 2022-04-19 NOTE — ANESTHESIA POSTPROCEDURE EVALUATION
Department of Anesthesiology  Postprocedure Note    Patient: Ayleen Potter  MRN: [id number]  YOB: 1950  Date of evaluation: 4/19/2022  Time:  10:22 AM     Procedure Summary     Date: 04/19/22 Room / Location: [facility] / [facility]    Anesthesia Start: Sera Kan Anesthesia Stop: 5716    Procedure: RIGHT COLECTOMY (Right ) Diagnosis: (CECAL MASS)    Surgeons: Kenny Caraballo MD Responsible Provider: Wes Bruce MD    Anesthesia Type: general, epidural ASA Status: 3          Anesthesia Type: general, epidural    Verona Phase I: Verona Score: 10    Verona Phase II:      Last vitals: Reviewed and per EMR flowsheets.        Anesthesia Post Evaluation    Patient location during evaluation: bedside  Patient participation: complete - patient participated  Level of consciousness: awake and awake and alert  Pain score: 0  Airway patency: patent  Nausea & Vomiting: no nausea and no vomiting  Complications: no  Cardiovascular status: blood pressure returned to baseline and hemodynamically stable  Respiratory status: acceptable  Hydration status: euvolemic

## 2022-04-19 NOTE — INTERVAL H&P NOTE
Update History & Physical    The patient's History and Physical of March 31, 2022 was reviewed with the patient and I examined the patient. There was no change. The surgical site was confirmed by the patient and me. Plan: The risks, benefits, expected outcome, and alternative to the recommended procedure have been discussed with the patient. Patient understands and wants to proceed with the procedure.      Electronically signed by Aida Bullard MD on 4/19/2022 at 7:27 AM

## 2022-04-19 NOTE — ANESTHESIA PROCEDURE NOTES
Epidural Block    Patient location during procedure: pre-op  Start time: 4/19/2022 7:25 AM  End time: 4/19/2022 7:45 AM  Reason for block: post-op pain management  Staffing  Performed: anesthesiologist   Anesthesiologist: Brandy Huerta MD  Preanesthetic Checklist  Completed: patient identified, IV checked, site marked, risks and benefits discussed, surgical consent, monitors and equipment checked, pre-op evaluation, timeout performed, anesthesia consent given, oxygen available and patient being monitored  Epidural  Patient position: sitting  Prep: Betadine  Patient monitoring: cardiac monitor, continuous pulse ox and frequent blood pressure checks  Approach: midline  Location: thoracic (1-12) (T12-L1)  Injection technique: LATIA saline  Provider prep: mask and sterile gloves  Needle  Needle type: Tuohy   Needle gauge: 17 G  Needle length: 3.5 in  Needle insertion depth: 6 cm  Catheter type: end hole  Catheter size: 18 G  Catheter at skin depth: 12 cm  Test dose: negative  Assessment  Sensory level: T6  Hemodynamics: stable  Attempts: 3+

## 2022-04-19 NOTE — BRIEF OP NOTE
Brief Postoperative Note      Patient: Cristina De Oliveira  YOB: 1950  MRN: [id number]    Date of Procedure: 4/19/2022    Pre-Op Diagnosis: CECAL MASS    Post-Op Diagnosis: Same       Procedure(s):  RIGHT COLECTOMY    Surgeon(s):  Ely Blackwood MD    Assistant:  First Assistant: Champ Burns    Anesthesia: General    Estimated Blood Loss (mL): less than 50     Complications: None    Specimens:   ID Type Source Tests Collected by Time Destination   A : right colectomy Tissue Colon SURGICAL PATHOLOGY Ely lBackwood MD 4/19/2022 6526        Implants:  * No implants in log *      Drains:   Urethral Catheter Temperature probe 16 fr (Active)       Findings: Circumferential mass at the ileocecal valve    Electronically signed by Ely Blackwood MD on 4/19/2022 at 10:04 AM

## 2022-04-20 LAB
ALBUMIN SERPL-MCNC: 3.3 G/DL (ref 3.5–4.6)
ALP BLD-CCNC: 45 U/L (ref 35–104)
ALT SERPL-CCNC: 13 U/L (ref 0–41)
ANION GAP SERPL CALCULATED.3IONS-SCNC: 12 MEQ/L (ref 9–15)
AST SERPL-CCNC: 19 U/L (ref 0–40)
BASOPHILS ABSOLUTE: 0 K/UL (ref 0–0.2)
BASOPHILS RELATIVE PERCENT: 0.1 %
BILIRUB SERPL-MCNC: 0.4 MG/DL (ref 0.2–0.7)
BUN BLDV-MCNC: 10 MG/DL (ref 8–23)
CALCIUM SERPL-MCNC: 8.1 MG/DL (ref 8.5–9.9)
CHLORIDE BLD-SCNC: 103 MEQ/L (ref 95–107)
CO2: 18 MEQ/L (ref 20–31)
CREAT SERPL-MCNC: 0.72 MG/DL (ref 0.7–1.2)
EOSINOPHILS ABSOLUTE: 0 K/UL (ref 0–0.7)
EOSINOPHILS RELATIVE PERCENT: 0 %
GFR AFRICAN AMERICAN: >60
GFR NON-AFRICAN AMERICAN: >60
GLOBULIN: 2.3 G/DL (ref 2.3–3.5)
GLUCOSE BLD-MCNC: 128 MG/DL (ref 70–99)
HCT VFR BLD CALC: 31.9 % (ref 42–52)
HEMOGLOBIN: 10.7 G/DL (ref 14–18)
LYMPHOCYTES ABSOLUTE: 0.7 K/UL (ref 1–4.8)
LYMPHOCYTES RELATIVE PERCENT: 8.1 %
MAGNESIUM: 2.1 MG/DL (ref 1.7–2.4)
MCH RBC QN AUTO: 31.9 PG (ref 27–31.3)
MCHC RBC AUTO-ENTMCNC: 33.7 % (ref 33–37)
MCV RBC AUTO: 94.7 FL (ref 80–100)
MONOCYTES ABSOLUTE: 0.7 K/UL (ref 0.2–0.8)
MONOCYTES RELATIVE PERCENT: 8.1 %
NEUTROPHILS ABSOLUTE: 7.4 K/UL (ref 1.4–6.5)
NEUTROPHILS RELATIVE PERCENT: 83.7 %
PDW BLD-RTO: 13.5 % (ref 11.5–14.5)
PHOSPHORUS: 2.9 MG/DL (ref 2.3–4.8)
PLATELET # BLD: 151 K/UL (ref 130–400)
POTASSIUM SERPL-SCNC: 4.4 MEQ/L (ref 3.4–4.9)
RBC # BLD: 3.37 M/UL (ref 4.7–6.1)
SODIUM BLD-SCNC: 133 MEQ/L (ref 135–144)
TOTAL PROTEIN: 5.6 G/DL (ref 6.3–8)
WBC # BLD: 8.8 K/UL (ref 4.8–10.8)

## 2022-04-20 PROCEDURE — 2580000003 HC RX 258: Performed by: SURGERY

## 2022-04-20 PROCEDURE — 83735 ASSAY OF MAGNESIUM: CPT

## 2022-04-20 PROCEDURE — 85025 COMPLETE CBC W/AUTO DIFF WBC: CPT

## 2022-04-20 PROCEDURE — 6370000000 HC RX 637 (ALT 250 FOR IP): Performed by: SURGERY

## 2022-04-20 PROCEDURE — 6360000002 HC RX W HCPCS: Performed by: INTERNAL MEDICINE

## 2022-04-20 PROCEDURE — 84100 ASSAY OF PHOSPHORUS: CPT

## 2022-04-20 PROCEDURE — 2500000003 HC RX 250 WO HCPCS: Performed by: SURGERY

## 2022-04-20 PROCEDURE — 6360000002 HC RX W HCPCS: Performed by: SURGERY

## 2022-04-20 PROCEDURE — 36415 COLL VENOUS BLD VENIPUNCTURE: CPT

## 2022-04-20 PROCEDURE — 80053 COMPREHEN METABOLIC PANEL: CPT

## 2022-04-20 PROCEDURE — 99024 POSTOP FOLLOW-UP VISIT: CPT | Performed by: SURGERY

## 2022-04-20 PROCEDURE — 2500000003 HC RX 250 WO HCPCS: Performed by: INTERNAL MEDICINE

## 2022-04-20 PROCEDURE — 1210000000 HC MED SURG R&B

## 2022-04-20 PROCEDURE — 2580000003 HC RX 258: Performed by: INTERNAL MEDICINE

## 2022-04-20 RX ADMIN — ROPIVACAINE HYDROCHLORIDE: 5 INJECTION, SOLUTION EPIDURAL; INFILTRATION; PERINEURAL at 05:53

## 2022-04-20 RX ADMIN — THIAMINE HYDROCHLORIDE 100 MG: 100 INJECTION, SOLUTION INTRAMUSCULAR; INTRAVENOUS at 08:59

## 2022-04-20 RX ADMIN — NALOXEGOL OXALATE 25 MG: 12.5 TABLET, FILM COATED ORAL at 08:59

## 2022-04-20 RX ADMIN — METOCLOPRAMIDE HYDROCHLORIDE 5 MG: 5 INJECTION INTRAMUSCULAR; INTRAVENOUS at 20:28

## 2022-04-20 RX ADMIN — LISINOPRIL 10 MG: 10 TABLET ORAL at 08:58

## 2022-04-20 RX ADMIN — METOPROLOL SUCCINATE 50 MG: 50 TABLET, EXTENDED RELEASE ORAL at 08:59

## 2022-04-20 RX ADMIN — POTASSIUM CHLORIDE, DEXTROSE MONOHYDRATE AND SODIUM CHLORIDE: 150; 5; 450 INJECTION, SOLUTION INTRAVENOUS at 20:31

## 2022-04-20 RX ADMIN — AMLODIPINE BESYLATE 5 MG: 5 TABLET ORAL at 08:58

## 2022-04-20 RX ADMIN — ENOXAPARIN SODIUM 40 MG: 100 INJECTION SUBCUTANEOUS at 13:21

## 2022-04-20 RX ADMIN — BISACODYL 10 MG: 10 SUPPOSITORY RECTAL at 09:00

## 2022-04-20 RX ADMIN — CEFOXITIN SODIUM 1000 MG: 1 POWDER, FOR SOLUTION INTRAVENOUS at 06:04

## 2022-04-20 RX ADMIN — POTASSIUM CHLORIDE, DEXTROSE MONOHYDRATE AND SODIUM CHLORIDE: 150; 5; 450 INJECTION, SOLUTION INTRAVENOUS at 04:24

## 2022-04-20 RX ADMIN — METOCLOPRAMIDE HYDROCHLORIDE 5 MG: 5 INJECTION INTRAMUSCULAR; INTRAVENOUS at 04:22

## 2022-04-20 RX ADMIN — POTASSIUM CHLORIDE, DEXTROSE MONOHYDRATE AND SODIUM CHLORIDE: 150; 5; 450 INJECTION, SOLUTION INTRAVENOUS at 13:21

## 2022-04-20 RX ADMIN — Medication 10 ML: at 20:31

## 2022-04-20 RX ADMIN — Medication 10 ML: at 09:26

## 2022-04-20 RX ADMIN — FOLIC ACID 1 MG: 5 INJECTION, SOLUTION INTRAMUSCULAR; INTRAVENOUS; SUBCUTANEOUS at 09:26

## 2022-04-20 RX ADMIN — Medication 10 ML: at 20:28

## 2022-04-20 RX ADMIN — CITALOPRAM HYDROBROMIDE 10 MG: 10 TABLET ORAL at 08:59

## 2022-04-20 RX ADMIN — ROPIVACAINE HYDROCHLORIDE: 5 INJECTION, SOLUTION EPIDURAL; INFILTRATION; PERINEURAL at 17:22

## 2022-04-20 RX ADMIN — METOCLOPRAMIDE HYDROCHLORIDE 5 MG: 5 INJECTION INTRAMUSCULAR; INTRAVENOUS at 11:17

## 2022-04-20 ASSESSMENT — PAIN SCALES - GENERAL: PAINLEVEL_OUTOF10: 0

## 2022-04-20 NOTE — PROGRESS NOTES
Progress Note  Date:2022       Room:[place] [address]  Patient Name:Zheng Lopez     Date of Birth:[birth year]     Age:71 y.o. Subjective    No acute events overnight. Some lightheadedness when attempting to walk with assistance yesterday afternoon. Hgb 10.7 down from 12.4 previous day. No active bleeding noted. Continues on epidural analgesia per primary team.  No other new/acute complaints at present. No flatus yet since surgery. Objective         Vitals Last 24 Hours:  TEMPERATURE:  Temp  Av.4 °F (36.3 °C)  Min: 96 °F (35.6 °C)  Max: 98.2 °F (36.8 °C)  RESPIRATIONS RANGE: Resp  Av.6  Min: 8  Max: 18  PULSE OXIMETRY RANGE: SpO2  Av.2 %  Min: 95 %  Max: 100 %  PULSE RANGE: Pulse  Av.4  Min: 21  Max: 76  BLOOD PRESSURE RANGE: Systolic (53QPM), LRI:513 , Min:119 , ECZ:913   ; Diastolic (61UAJ), VBA:22, Min:53, Max:74    I/O (24Hr): Intake/Output Summary (Last 24 hours) at 2022 1011  Last data filed at 2022 6455  Gross per 24 hour   Intake 3080.67 ml   Output 1200 ml   Net 1880.67 ml     Objective   Constitutional: Elderly male reclining in bed no acute distress. Head: NCAT  Eyes: PERRLA, EOMI  Neck: Trachea midline, phonation normal  Cardiovascular: RRR. Warm and well perfused peripherally. No significant murmurs appreciated. No pretibial edema. Pulmonary: Normal rate and effort of respiration on room air. Grossly clear to auscultation bilaterally. Abdomen: Soft, nontense, non-distended. Postoperative surgical site dressing over vertical incision just right of midline, C/D/I. No bowel sounds appreciated today. Neurologic: Grossly alert and oriented. No focal neurologic deficits appreciated. No tremors appreciated. Psychiatric: Pleasant, calm, cooperative.     Labs/Imaging/Diagnostics    Labs:  CBC:  Recent Labs     22  1301 22  0507   WBC 8.6 8.8   RBC 3.96* 3.37*   HGB 12.4* 10.7*   HCT 37.5* 31.9*   MCV 94.7 94.7   RDW 13.2 13.5   PLT 165 151     CHEMISTRIES:  Recent Labs     04/19/22  1301 04/20/22  0507   * 133*   K 4.5 4.4    103   CO2 20 18*   BUN 12 10   CREATININE 0.73 0.72   GLUCOSE 125* 128*   PHOS 3.5 2.9   MG 2.1 2.1     PT/INR:No results for input(s): PROTIME, INR in the last 72 hours. APTT:No results for input(s): APTT in the last 72 hours. LIVER PROFILE:  Recent Labs     04/19/22  1301 04/20/22  0507   AST 25 19   ALT 17 13   BILITOT 0.6 0.4   ALKPHOS 51 45       Imaging Last 24 Hours:  No results found. Assessment//Plan           Hospital Problems           Last Modified POA    * (Principal) Colonic mass 4/19/2022 Yes    Cecum mass 4/19/2022 Yes        Assessment & Plan    Active problems:  · Right hemicolectomy with primary anastomosis for tubular adenoma     Chronic problems:  · Alcohol dependence without prior withdrawal  · MUNA on home CPAP  · Hypertension  · Hyperlipidemia  · Osteoarthritis        Plan:  · Postoperative management per primary team  · [place] protocol for alcohol withdrawal prophylaxis  · Daily labs to include CBC, CMP, magnesium. · Check phosphorus now next a.m.  · Home CPAP brought in by wife. Patient to wear nightly while sleeping. · Continue/hold home medications as ordered.       Electronically signed by Dale Laura DO on 4/20/22 at 10:11 AM EDT

## 2022-04-20 NOTE — PROGRESS NOTES
Shift assessment complete. Medications administered per including new epidural bag. Call light within reach. No further needs verbalized at this time.

## 2022-04-20 NOTE — CARE COORDINATION
[facility] Case Management Initial Discharge Assessment    Met with Patient to discuss discharge plan. PCP: Milissa Leyden, MD                                Date of Last Visit:  [facility] 2022    [place] Patient: No        [facility] Notified: no    If no PCP, list provided? N/A    Discharge Planning    Living Arrangements: independently at home    Who do you live with? Massimo Esteves    Who helps you with your care:  self    If lives at home:     Do you have any barriers navigating in your home? no    Patient can perform ADL? Yes    Current Services (outpatient and in home) :  None    Dialysis: No    Is transportation available to get to your appointments? Yes    DME Equipment:  no    Respiratory equipment: None    Respiratory provider:  no     Pharmacy:  yes - [facility]  / [address]ayy [address]. Consult with Medication Assistance Program?  No      [facility]    Patient agreeable to SNF/Rehab? No    Other discharge needs identified? N/A    Does Patient Have a High-Risk for Readmission Diagnosis (CHF, PN, MI, COPD)? No         Initial Discharge Plan? (Note: please see concurrent daily documentation for any updates after initial note). PATIENT PLANS TO RETURN HOME WITH WIFE. PATIENT DENIES NEEDS.     Readmission Risk              Risk of Unplanned Readmission:  11         Electronically signed by JESUS De La Garza, LSW on 4/20/2022 at 3:45 PM

## 2022-04-20 NOTE — PROGRESS NOTES
Pt Name: Barb Hurst  Medical Record Number: [id number]  Date of Birth 1950   Admit date 4/19/2022  5:46 AM  Today's Date: 4/20/2022     ASSESSMENT  1. Post op day # 1  2. Barb Hurst had Procedure(s):  RIGHT COLECTOMY   3.  Doing well    PLAN  1.  clear liquids  2. Ambulate    SUBJECTIVE  Chief complaint: None  Afebrile, vital signs are stable. He denies any nausea or vomiting, has passed flatus but has not had a bowel movement. He is tolerating a Diet NPO Exceptions are: Sips of Water with Meds. His pain is well controlled on current medications. He has not been ambulating in the halls. He was a little lightheaded yesterday when he stood up but today he states he feels better. has a past medical history of Arthritis, Hyperlipidemia, Hypertension, and Sleep apnea. CURRENT MEDS  Scheduled Meds:   amLODIPine  5 mg Oral Daily    citalopram  10 mg Oral Daily    lisinopril  10 mg Oral Daily    metoprolol succinate  50 mg Oral Daily    sodium chloride flush  5-40 mL IntraVENous 2 times per day    enoxaparin  40 mg SubCUTAneous Daily    naloxegol  25 mg Oral Daily    metoclopramide  5 mg IntraVENous Q8H    bisacodyl  10 mg Rectal Daily    sodium chloride flush  5-40 mL IntraVENous 2 times per day    thiamine  100 mg IntraVENous Daily    folic acid IVPB  1 mg IntraVENous Daily     Continuous Infusions:   fentanyl epidural builder 8 mL/hr at 04/20/22 0553    dextrose 5% and 0.45% NaCl with KCl 20 mEq 125 mL/hr at 04/20/22 0424    sodium chloride      sodium chloride       PRN Meds:.naloxone, ondansetron, sodium chloride flush, sodium chloride, sodium chloride flush, sodium chloride, LORazepam **OR** LORazepam **OR** LORazepam **OR** LORazepam **OR** LORazepam **OR** LORazepam **OR** LORazepam **OR** LORazepam    OBJECTIVE  CURRENT VITALS:  height is 6' (1.829 m) and weight is 210 lb (95.3 kg). His oral temperature is 97.9 °F (36.6 °C).  His blood pressure is 125/54 (abnormal) and his obtained. Oral contrast medium:  Barium sulfate, 450 mL. Intravenous contrast medium: Isovue-300, 100 mL Comparison:  CT abdomen pelvis, November 1, 2014. Findings: Study limited secondary to beam hardening artifact from bilateral hip replacements. Lungs:  Lung bases are clear Liver:  Normal in size, shape, and attenuation. Bile Ducts:  Normal in caliber. Gallbladder:  No stones or wall thickening. Pancreas:  Normal without masses, cysts, ductal dilatation or calcification. Spleen:  Normal in size without masses or calcifications. No splenules. Kidneys:  Normal in size and enhancement. No hydronephrosis, masses, or stones. Adrenals:  Normal. Small bowel:  Normal in caliber. Appendix:  Normal. Colon:  Portion of cecum and rectum obscured by beam hardening artifact. Circumferential wall thickening of cecum (series 2, image 62, series 601, image 40). Diverticular change, proximal sigmoid colon. Peritoneum:  No ascites, free air, or fluid collections. Vessels: Aorta normal in course and caliber. Portal vein, splenic vein, superior mesenteric vein are patent. Lymph nodes:  Retroperitoneal:  No enlarged retroperitoneal lymph nodes. Mesenteric:  No enlarged mesenteric lymph nodes. Pelvic: No enlarged pelvic lymph nodes. Ureters: Normal in course and caliber. No calcifications. Bladder: Obscured by beam hardening artifact. Abdominal Wall:  No hernia identified. No diastasis of rectus musculature. No edema or masses. Bones:  No bone lesions. Diffuse disc space narrowing lumbar spine, greatest at L3-4 through L5-S1. Large prominent bridging anterior osteophytes, L2-L3, and to lesser degree, L1-L2. Bipolar bilateral hip replacement. Limitations as discussed. Circumferential wall thickening in patient with clinical history of cecal mass. Sigmoid diverticulosis. Other findings discussed.  All CT scans at this facility use dose modulation, iterative reconstruction, and/or weight based dosing when appropriate to reduce radiation dose to as low as reasonably achievable.      Electronically signed by Abdifatah Mcwilliams MD on 4/20/2022 at 9:43 AM

## 2022-04-20 NOTE — ADDENDUM NOTE
Addendum  created 04/20/22 1943 by Toby Duran, [address] [facility] Event edited

## 2022-04-20 NOTE — FLOWSHEET NOTE
56 - Pt had epidural placed yesterday and some bleeding has been noted at the site. Pt is due for lovenox. BILL Garcia - CRNA notified. 1139 - Spoke to on call anesthesiologist who states it is okay to start Lovenox at this time and to just make sure we time the last lovenox dose to be given 12 hours before removal of epidural.     1220 - Pt ambulating in room and naranjo with one assist. Pt's dressing assessed before and after and remain the same. Pt denies pain or dizziness. Pt placed in chair with chair alarm active. Corral in place. Pt's call light within reach. 1322 - Verified with Dr. Rebeka Cates that there is no plan to remove epidural within the next 12 hours. Lovenox given at this time. Dressings look the same from previous assessment, no further bleed noted. 1414 - Pt attempted to have BM. Pt could only pass flatus.  Dr. Rebeka Cates notified and he states he will advance diet to clear liquids,.     1722 - 20 cc of fentanyl wasted with Satish  RN

## 2022-04-20 NOTE — DISCHARGE INSTR - COC
VT TOTAL HIP ARTHROPLASTY Left 2018    LEFT TOTAL HIP ARTHROPLASTY, LATERAL DECUB, JOY 62 TM CUP 13 STEM EXTENDED OFFSET 36 HEAD O NECK performed by Wendelyn Canavan, MD at [address] [place] [address] chest squamous cell cancer    TONSILLECTOMY      as child       Immunization History:   Immunization History   Administered Date(s) Administered    COVID-19, Pfizer Purple top, DILUTE for use, 12+ yrs, 30mcg/0.3mL dose 2021, 2021, 10/14/2021       Active Problems:  Patient Active Problem List   Diagnosis Code    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.2    Cervical nerve root disorder M54.12    Arthropathy of cervical facet joint M47.812    MUNA (obstructive sleep apnea) G47.33    Patient overweight E66.3    Onychomycosis B35.1    Pain in both feet M79.671, M79.672    Mixed hyperlipidemia E78.2    Essential hypertension I10    Status post hip replacement, right Z96.641    Degenerative joint disease of left hip M16.12    Constipation K59.00    Status post total hip replacement, left C98.790    Obesity (BMI 30-39. 9) E66.9    Adenomatous polyp of colon D12.6    Cecum mass K63.89    Colonic mass K63.89       Isolation/Infection:   Isolation            No Isolation          Patient Infection Status       None to display            Nurse Assessment:  Last Vital Signs: BP (!) 108/55   Pulse 70   Temp 97.7 °F (36.5 °C) (Oral)   Resp 16   Ht 6' (1.829 m)   Wt 210 lb (95.3 kg)   SpO2 95%   BMI 28.48 kg/m²     Last documented pain score (0-10 scale): Pain Level: 0  Last Weight:   Wt Readings from Last 1 Encounters:   22 210 lb (95.3 kg)     Mental Status:  {IP PT MENTAL STATUS:77754}    IV Access:  { ZHANNA IV ACCESS:859963325}    Nursing Mobility/ADLs:  Walking   {CHP DME XIHO:489526691}  Transfer  {CHP DME CDAK:477332400}  Bathing  {CHP DME UERT:780310897}  Dressing  {CHP DME IPWR:826015522}  Toileting  {CHP DME SM}  Feeding  {CHP DME BOCE:415499501}  Med Admin  {CHP DME XBCS:892697802}  Med Delivery   [facility] MED LXTLXBBV:361814812}    Wound Care Documentation and Therapy:  Incision 18 Hip Right (Active)   Number of days: 1356       Incision 18 Hip Left (Active)   Number of days: 1251       Incision 22 Abdomen Lower;Mid (Active)   Dressing Status Clean;Dry; Intact 22 0942   Drainage Amount None 22 0942   Number of days: 1        Elimination:  Continence: Bowel: {YES / PL:12175}  Bladder: {YES / BQ:98303}  Urinary Catheter: {Urinary Catheter:946156334}   Colostomy/Ileostomy/Ileal Conduit: {YES / MARIA A:59990}       Date of Last BM: ***    Intake/Output Summary (Last 24 hours) at 2022  Last data filed at 2022 1542  Gross per 24 hour   Intake --   Output 1600 ml   Net -1600 ml     I/O last 3 completed shifts: In: 3130.7 [I.V.:3030.7;  IV Piggyback:100]  Out: 1250 [Urine:1250]    Safety Concerns:     {[facility] Safety Concerns:064872973}    Impairments/Disabilities:      {[facility] Impairments/Disabilities:371109575}    Nutrition Therapy:  Current Nutrition Therapy:   [facility] Diet List:294360627}    Routes of Feeding: {[facility] DME Other Feedings:860074095}  Liquids: {Slp liquid thickness:21160}  Daily Fluid Restriction: {[facility] DME Yes amt example:111714491}  Last Modified Barium Swallow with Video (Video Swallowing Test): {Done Not Done KW:692989318}    Treatments at the Time of Hospital Discharge:   Respiratory Treatments: ***  Oxygen Therapy:  {Therapy; copd oxygen:26444}  Ventilator:    {[facility] Vent EEFY:493617519}    Rehab Therapies: {THERAPEUTIC INTERVENTION:9482382101}  Weight Bearing Status/Restrictions: {[facility] Weight Bearin}  Other Medical Equipment (for information only, NOT a DME order):  {EQUIPMENT:029985359}  Other Treatments: ***    Patient's personal belongings (please select all that are sent with patient):  {[facility] DME Belongings:445683951}    RN SIGNATURE:  {Esignature:201495631}    CASE MANAGEMENT/SOCIAL WORK SECTION    Inpatient Status Date: ***    Readmission Risk Assessment Score:  Readmission Risk              Risk of Unplanned Readmission:  11           Discharging to Facility/ Agency   Name:   Address:  Phone:  Fax:    Dialysis Facility (if applicable)   Name:  Address:  Dialysis Schedule:  Phone:  Fax:    / signature: {Rodrigueature:208153397}    PHYSICIAN SECTION    Prognosis: {Prognosis:2035008767}    Condition at Discharge: [address] Patient Condition:287522204}    Rehab Potential (if transferring to Rehab): {Prognosis:5627947335}    Recommended Labs or Other Treatments After Discharge: ***    Physician Certification: I certify the above information and transfer of Angelique Herrera  is necessary for the continuing treatment of the diagnosis listed and that he requires {Admit to Appropriate Level of Care:51942} for {GREATER/LESS:718098930} 30 days.      Update Admission H&P: {CHP DME Changes in LBJA:580364430}    PHYSICIAN SIGNATURE:  {Rodrigueature:634445500}

## 2022-04-21 LAB
ALBUMIN SERPL-MCNC: 3.4 G/DL (ref 3.5–4.6)
ALP BLD-CCNC: 45 U/L (ref 35–104)
ALT SERPL-CCNC: 31 U/L (ref 0–41)
ANION GAP SERPL CALCULATED.3IONS-SCNC: 10 MEQ/L (ref 9–15)
AST SERPL-CCNC: 53 U/L (ref 0–40)
BASOPHILS ABSOLUTE: 0.1 K/UL (ref 0–0.2)
BASOPHILS RELATIVE PERCENT: 1.3 %
BILIRUB SERPL-MCNC: 0.3 MG/DL (ref 0.2–0.7)
BUN BLDV-MCNC: 9 MG/DL (ref 8–23)
CALCIUM SERPL-MCNC: 7.9 MG/DL (ref 8.5–9.9)
CHLORIDE BLD-SCNC: 105 MEQ/L (ref 95–107)
CO2: 20 MEQ/L (ref 20–31)
CREAT SERPL-MCNC: 0.72 MG/DL (ref 0.7–1.2)
EOSINOPHILS ABSOLUTE: 0.1 K/UL (ref 0–0.7)
EOSINOPHILS RELATIVE PERCENT: 0.8 %
GFR AFRICAN AMERICAN: >60
GFR NON-AFRICAN AMERICAN: >60
GLOBULIN: 2.4 G/DL (ref 2.3–3.5)
GLUCOSE BLD-MCNC: 97 MG/DL (ref 70–99)
HCT VFR BLD CALC: 30 % (ref 42–52)
HEMOGLOBIN: 9.8 G/DL (ref 14–18)
LYMPHOCYTES ABSOLUTE: 1.2 K/UL (ref 1–4.8)
LYMPHOCYTES RELATIVE PERCENT: 18.8 %
MAGNESIUM: 2.3 MG/DL (ref 1.7–2.4)
MCH RBC QN AUTO: 31.3 PG (ref 27–31.3)
MCHC RBC AUTO-ENTMCNC: 32.8 % (ref 33–37)
MCV RBC AUTO: 95.4 FL (ref 80–100)
MONOCYTES ABSOLUTE: 0.5 K/UL (ref 0.2–0.8)
MONOCYTES RELATIVE PERCENT: 8.1 %
NEUTROPHILS ABSOLUTE: 4.7 K/UL (ref 1.4–6.5)
NEUTROPHILS RELATIVE PERCENT: 71 %
PDW BLD-RTO: 13.3 % (ref 11.5–14.5)
PHOSPHORUS: 3 MG/DL (ref 2.3–4.8)
PLATELET # BLD: 139 K/UL (ref 130–400)
PLATELET SLIDE REVIEW: NORMAL
POTASSIUM SERPL-SCNC: 4.5 MEQ/L (ref 3.4–4.9)
RBC # BLD: 3.14 M/UL (ref 4.7–6.1)
RBC # BLD: NORMAL 10*6/UL
SODIUM BLD-SCNC: 135 MEQ/L (ref 135–144)
TOTAL PROTEIN: 5.8 G/DL (ref 6.3–8)
WBC # BLD: 6.6 K/UL (ref 4.8–10.8)

## 2022-04-21 PROCEDURE — 6360000002 HC RX W HCPCS: Performed by: SURGERY

## 2022-04-21 PROCEDURE — 99024 POSTOP FOLLOW-UP VISIT: CPT | Performed by: SURGERY

## 2022-04-21 PROCEDURE — 2580000003 HC RX 258: Performed by: INTERNAL MEDICINE

## 2022-04-21 PROCEDURE — 36415 COLL VENOUS BLD VENIPUNCTURE: CPT

## 2022-04-21 PROCEDURE — 83735 ASSAY OF MAGNESIUM: CPT

## 2022-04-21 PROCEDURE — 6370000000 HC RX 637 (ALT 250 FOR IP): Performed by: SURGERY

## 2022-04-21 PROCEDURE — 80053 COMPREHEN METABOLIC PANEL: CPT

## 2022-04-21 PROCEDURE — 85025 COMPLETE CBC W/AUTO DIFF WBC: CPT

## 2022-04-21 PROCEDURE — 2580000003 HC RX 258: Performed by: SURGERY

## 2022-04-21 PROCEDURE — 2500000003 HC RX 250 WO HCPCS: Performed by: SURGERY

## 2022-04-21 PROCEDURE — 2500000003 HC RX 250 WO HCPCS: Performed by: INTERNAL MEDICINE

## 2022-04-21 PROCEDURE — 1210000000 HC MED SURG R&B

## 2022-04-21 PROCEDURE — 84100 ASSAY OF PHOSPHORUS: CPT

## 2022-04-21 PROCEDURE — 6360000002 HC RX W HCPCS: Performed by: INTERNAL MEDICINE

## 2022-04-21 RX ADMIN — FOLIC ACID 1 MG: 5 INJECTION, SOLUTION INTRAMUSCULAR; INTRAVENOUS; SUBCUTANEOUS at 09:30

## 2022-04-21 RX ADMIN — ROPIVACAINE HYDROCHLORIDE: 5 INJECTION, SOLUTION EPIDURAL; INFILTRATION; PERINEURAL at 17:32

## 2022-04-21 RX ADMIN — Medication 10 ML: at 20:31

## 2022-04-21 RX ADMIN — POTASSIUM CHLORIDE, DEXTROSE MONOHYDRATE AND SODIUM CHLORIDE: 150; 5; 450 INJECTION, SOLUTION INTRAVENOUS at 04:56

## 2022-04-21 RX ADMIN — THIAMINE HYDROCHLORIDE 100 MG: 100 INJECTION, SOLUTION INTRAMUSCULAR; INTRAVENOUS at 08:35

## 2022-04-21 RX ADMIN — POTASSIUM CHLORIDE, DEXTROSE MONOHYDRATE AND SODIUM CHLORIDE: 150; 5; 450 INJECTION, SOLUTION INTRAVENOUS at 14:15

## 2022-04-21 RX ADMIN — NALOXEGOL OXALATE 25 MG: 12.5 TABLET, FILM COATED ORAL at 08:34

## 2022-04-21 RX ADMIN — METOPROLOL SUCCINATE 50 MG: 50 TABLET, EXTENDED RELEASE ORAL at 08:35

## 2022-04-21 RX ADMIN — ROPIVACAINE HYDROCHLORIDE: 5 INJECTION, SOLUTION EPIDURAL; INFILTRATION; PERINEURAL at 04:52

## 2022-04-21 RX ADMIN — METOCLOPRAMIDE HYDROCHLORIDE 5 MG: 5 INJECTION INTRAMUSCULAR; INTRAVENOUS at 03:38

## 2022-04-21 RX ADMIN — METOCLOPRAMIDE HYDROCHLORIDE 5 MG: 5 INJECTION INTRAMUSCULAR; INTRAVENOUS at 20:31

## 2022-04-21 RX ADMIN — CITALOPRAM HYDROBROMIDE 10 MG: 10 TABLET ORAL at 08:34

## 2022-04-21 RX ADMIN — METOCLOPRAMIDE HYDROCHLORIDE 5 MG: 5 INJECTION INTRAMUSCULAR; INTRAVENOUS at 11:45

## 2022-04-21 RX ADMIN — AMLODIPINE BESYLATE 5 MG: 5 TABLET ORAL at 08:34

## 2022-04-21 RX ADMIN — LISINOPRIL 10 MG: 10 TABLET ORAL at 08:34

## 2022-04-21 ASSESSMENT — PAIN SCALES - GENERAL: PAINLEVEL_OUTOF10: 2

## 2022-04-21 NOTE — PROGRESS NOTES
Pt Name: Jamari Bhatt  Medical Record Number: [id number]  Date of Birth 1950   Admit date 4/19/2022  5:46 AM  Today's Date: 4/21/2022     ASSESSMENT  1. Post op day # 2  2. Jamari Bhatt had Procedure(s):  RIGHT COLECTOMY   3.  Doing well  4. Anemia due to acute blood loss anemia, no active bleeding    PLAN  1. General diet  2. Remove Corral catheter and epidural in the morning  3. Possible discharge tomorrow    SUBJECTIVE  Chief complaint: None  Afebrile, vital signs are stable. He denies any nausea or vomiting, has passed flatus and had a bowel movement. He is tolerating a ADULT DIET; Clear Liquid. His pain is well controlled on current medications. He has been ambulating in the halls. Hemoglobin 9.8      has a past medical history of Arthritis, Hyperlipidemia, Hypertension, and Sleep apnea. CURRENT MEDS  Scheduled Meds:   amLODIPine  5 mg Oral Daily    citalopram  10 mg Oral Daily    lisinopril  10 mg Oral Daily    metoprolol succinate  50 mg Oral Daily    sodium chloride flush  5-40 mL IntraVENous 2 times per day    enoxaparin  40 mg SubCUTAneous Daily    naloxegol  25 mg Oral Daily    metoclopramide  5 mg IntraVENous Q8H    bisacodyl  10 mg Rectal Daily    sodium chloride flush  5-40 mL IntraVENous 2 times per day    thiamine  100 mg IntraVENous Daily    folic acid IVPB  1 mg IntraVENous Daily     Continuous Infusions:   fentanyl epidural builder 8 mL/hr at 04/21/22 0452    dextrose 5% and 0.45% NaCl with KCl 20 mEq 125 mL/hr at 04/21/22 0456    sodium chloride      sodium chloride       PRN Meds:.naloxone, ondansetron, sodium chloride flush, sodium chloride, sodium chloride flush, sodium chloride, LORazepam **OR** LORazepam **OR** LORazepam **OR** LORazepam **OR** LORazepam **OR** LORazepam **OR** LORazepam **OR** LORazepam    OBJECTIVE  CURRENT VITALS:  height is 6' (1.829 m) and weight is 210 lb (95.3 kg). His oral temperature is 97 °F (36.1 °C).  His blood pressure is 130/68 and his pulse is 72. His respiration is 16 and oxygen saturation is 95%. Temperature Range (24h):Temp: 97 °F (36.1 °C) Temp  Av.7 °F (36.5 °C)  Min: 97 °F (36.1 °C)  Max: 98.4 °F (36.9 °C)  BP Range (41I): Systolic (18JNL), HALL:104 , Min:108 , PCH:808     Diastolic (52PTZ), ORH:23, Min:55, Max:117    Pulse Range (24h): Pulse  Av.7  Min: 63  Max: 72  Respiration Range (24h): Resp  Avg: 15.2  Min: 8  Max: 18    GENERAL: alert, no distress  LUNGS: clear to ausculation, without wheezes, rales or rhonci  HEART: normal rate and regular rhythm  ABDOMEN: non-distended, soft, incisional tenderness, bowel sounds present in all 4 quadrants and no guarding or peritoneal signs  INCISION: healing well, no significant drainage, wearing abdominal binder no significant erythema, Aquacel dressing in place  EXTERMITY: no cyanosis, clubbing or edema    In: 3714.1 [P.O.:240; I.V.:3377]  Out: 1650 [Urine:1650]  Date 22 0000 - 22 2359   Shift 0372-4302 9456-3127 3489-1048 24 Hour Total   INTAKE   Shift Total(mL/kg)       OUTPUT   Urine(mL/kg/hr) 1250(1.6)   1250   Shift Total(mL/kg) 1250(13.1)   1250(13.1)   Weight (kg) 95.3 95.3 95.3 95.3       LABS  Recent Labs     22  1301 22  0507 22  0643   WBC 8.6 8.8 6.6   HGB 12.4* 10.7* 9.8*   HCT 37.5* 31.9* 30.0*    151 139   * 133* 135   K 4.5 4.4 4.5    103 105   CO2 20 18* 20   BUN 12 10 9   CREATININE 0.73 0.72 0.72   MG 2.1 2.1 2.3   PHOS 3.5 2.9 3.0   CALCIUM 8.9 8.1* 7.9*      No results for input(s): PTT, INR in the last 72 hours.     Invalid input(s): PT  Recent Labs     22  1301 22  0507 22  0643   AST 25 19 53*   ALT 17 13 31   BILITOT 0.6 0.4 0.3       RADIOLOGY  CT ABDOMEN PELVIS W IV CONTRAST    Result Date: 3/25/2022  CT of the Abdomen and Pelvis with intravenous contrast medium History:    Cecal mass Technical Factors: CT imaging of the abdomen and pelvis were obtained and formatted as 5 mm contiguous axial images from the domes of the diaphragm to the symphysis pubis. Sagittal and coronal reconstructions were also obtained. Oral contrast medium:  Barium sulfate, 450 mL. Intravenous contrast medium: Isovue-300, 100 mL Comparison:  CT abdomen pelvis, November 1, 2014. Findings: Study limited secondary to beam hardening artifact from bilateral hip replacements. Lungs:  Lung bases are clear Liver:  Normal in size, shape, and attenuation. Bile Ducts:  Normal in caliber. Gallbladder:  No stones or wall thickening. Pancreas:  Normal without masses, cysts, ductal dilatation or calcification. Spleen:  Normal in size without masses or calcifications. No splenules. Kidneys:  Normal in size and enhancement. No hydronephrosis, masses, or stones. Adrenals:  Normal. Small bowel:  Normal in caliber. Appendix:  Normal. Colon:  Portion of cecum and rectum obscured by beam hardening artifact. Circumferential wall thickening of cecum (series 2, image 62, series 601, image 40). Diverticular change, proximal sigmoid colon. Peritoneum:  No ascites, free air, or fluid collections. Vessels: Aorta normal in course and caliber. Portal vein, splenic vein, superior mesenteric vein are patent. Lymph nodes:  Retroperitoneal:  No enlarged retroperitoneal lymph nodes. Mesenteric:  No enlarged mesenteric lymph nodes. Pelvic: No enlarged pelvic lymph nodes. Ureters: Normal in course and caliber. No calcifications. Bladder: Obscured by beam hardening artifact. Abdominal Wall:  No hernia identified. No diastasis of rectus musculature. No edema or masses. Bones:  No bone lesions. Diffuse disc space narrowing lumbar spine, greatest at L3-4 through L5-S1. Large prominent bridging anterior osteophytes, L2-L3, and to lesser degree, L1-L2. Bipolar bilateral hip replacement. Limitations as discussed.                                                                                      Circumferential wall thickening in patient with clinical history of cecal mass. Sigmoid diverticulosis. Other findings discussed. All CT scans at this facility use dose modulation, iterative reconstruction, and/or weight based dosing when appropriate to reduce radiation dose to as low as reasonably achievable.      Electronically signed by Willie Schmidt MD on 4/21/2022 at 8:34 AM

## 2022-04-21 NOTE — PROGRESS NOTES
Spiritual Care Services     Summary of Visit:   visited patient and wife on 2W. Patient is a member of [employer] an attends remotely but has been an active member. Patient felt relieved that his surgery went well and the result was not cancerous.  provided support and praye to patient. Spiritual Assessment/Intervention/Outcomes:    Encounter Summary  Encounter Overview/Reason : Initial Encounter  Service Provided For[de-identified] Patient and family together  Referral/Consult From[de-identified] Rounding  Support System: Spouse,J[employer]/[facility]ol community  Complexity of Encounter: Moderate  Begin Time: 1420  End Time : 1440  Total Time Calculated: 20 min  Encounter   Type: Initial Screen/Assessment                          Values / Beliefs  Do You Have Any Ethnic, Cultural, Sacramental, or Spiritual [employer] Needs You Would Like Us To Be Aware of While You Are in the Hospital : No    Care Plan:    Provide support and prayer as needed or directed. Spiritual Care Services   Electronically signed by Yola Morris on 4/21/22 at 2:45 PM EDT     To reach a  for emotional and spiritual support, place an [facility] consult request.   If a  is needed immediately, dial 0 and ask to page the on-call .

## 2022-04-21 NOTE — CARE COORDINATION
This LSW met with patient and wife at bedside this afternoon. Patient is resting comfortably. No concerns voiced during visit. Discharge plan discussed. D/C PLAN: HOME WITH WIFE. PATIENT DENIES NEEDS. ANTICIPATED D/C DATE: 4.22.2022    LSW / TAWNY TO FOLLOW.   Electronically signed by JESUS Monique, LSDWIGHT on 4/21/22 at 2:21 PM EDT

## 2022-04-21 NOTE — FLOWSHEET NOTE
2984 - Pt had BM this morning, dark brown, soft and medium in size. Walked to bathroom with one assist and a steady gait. Pt back to bed. Dressings dry and intact. Old drainage noted by epidural, lovenox held for possible epidural removal today. Abdomen aquacell dressing clean, dry and intact. Pt refusing dulcolax suppository after BM.     1245 - Dr. Elias Armando states rodriguez and epidural plan to come out tomorrow. Give lovenox. 1333 - Pt had another BM. 1534 - Wasted 15 mL of fentanyl with Kathy CAMPOS.     3874 - Pt had another loose BM, brown. PT has ambulated to and from bathroom and in room multiples times during shift.

## 2022-04-21 NOTE — PROGRESS NOTES
Progress Note  Date:2022       Room:[place] [address]-  Patient Name:Zheng Hastings Reading     YOB: 1950     Age:71 y.o. Subjective    No acute events overnight. Hgb 9.8 down from 10.7 down from 12.4. No active bleeding noted. Continues on epidural analgesia per primary team, anticipated to be removed . No other new/acute complaints at present. No flatus yet since surgery. Objective         Vitals Last 24 Hours:  TEMPERATURE:  Temp  Av.7 °F (36.5 °C)  Min: 97 °F (36.1 °C)  Max: 98.4 °F (36.9 °C)  RESPIRATIONS RANGE: Resp  Avg: 15.2  Min: 8  Max: 18  PULSE OXIMETRY RANGE: SpO2  Av %  Min: 94 %  Max: 99 %  PULSE RANGE: Pulse  Av.9  Min: 63  Max: 72  BLOOD PRESSURE RANGE: Systolic (20DJB), GPQ:826 , Min:114 , GLY:156   ; Diastolic (15CEN), SAMUEL:73, Min:61, Max:117    I/O (24Hr): Intake/Output Summary (Last 24 hours) at 2022 1351  Last data filed at 2022 1326  Gross per 24 hour   Intake 6117.03 ml   Output 2550 ml   Net 3567.03 ml     Objective:  Vital signs: (most recent): Blood pressure 126/61, pulse 64, temperature 97.7 °F (36.5 °C), temperature source Oral, resp. rate 16, height 6' (1.829 m), weight 210 lb (95.3 kg), SpO2 94 %. Constitutional: Elderly male reclining in bed no acute distress. Head: NCAT  Eyes: PERRLA, EOMI  Neck: Trachea midline, phonation normal  Cardiovascular: RRR. Warm and well perfused peripherally. No significant murmurs appreciated. No pretibial edema. Pulmonary: Normal rate and effort of respiration on room air. Grossly clear to auscultation bilaterally. Abdomen: Soft, nontense, non-distended. Postoperative surgical site dressing over vertical incision just right of midline, C/D/I. Active bowel sounds today. Neurologic: Grossly alert and oriented. No focal neurologic deficits appreciated. No tremors appreciated. Psychiatric: Pleasant, calm, cooperative.     Labs/Imaging/Diagnostics    Labs:  CBC:  Recent Labs 04/19/22  1301 04/20/22  0507 04/21/22  0643   WBC 8.6 8.8 6.6   RBC 3.96* 3.37* 3.14*   HGB 12.4* 10.7* 9.8*   HCT 37.5* 31.9* 30.0*   MCV 94.7 94.7 95.4   RDW 13.2 13.5 13.3    151 139     CHEMISTRIES:  Recent Labs     04/19/22  1301 04/20/22  0507 04/21/22  0643   * 133* 135   K 4.5 4.4 4.5    103 105   CO2 20 18* 20   BUN 12 10 9   CREATININE 0.73 0.72 0.72   GLUCOSE 125* 128* 97   PHOS 3.5 2.9 3.0   MG 2.1 2.1 2.3     PT/INR:No results for input(s): PROTIME, INR in the last 72 hours. APTT:No results for input(s): APTT in the last 72 hours. LIVER PROFILE:  Recent Labs     04/19/22  1301 04/20/22  0507 04/21/22  0643   AST 25 19 53*   ALT 17 13 31   BILITOT 0.6 0.4 0.3   ALKPHOS 51 45 45       Imaging Last 24 Hours:  No results found. Assessment//Plan           Hospital Problems           Last Modified POA    * (Principal) Colonic mass 4/19/2022 Yes    Cecum mass 4/19/2022 Yes        Assessment & Plan    Active problems:  · Right hemicolectomy with primary anastomosis for tubular adenoma     Chronic problems:  · Alcohol dependence without prior withdrawal  · MUNA on home CPAP  · Hypertension  · Hyperlipidemia  · Osteoarthritis        Plan:  · Postoperative management per primary team  · WA protocol for alcohol withdrawal prophylaxis  · Daily labs to include CBC, CMP, magnesium. · Check phosphorus now next a.m.  · Home CPAP brought in by wife. Patient to wear nightly while sleeping. · Continue/hold home medications as ordered. 4/22: Surgical primary team planning for removal of epidural and likely discharge on 4/22. Hemoglobin continues to downtrend very slowly, but not actively dropping and no active external bleeding, suspected just delayed measurability of perioperative anemia.   Patient appears medically appropriate for discharge when cleared for discharge by primary team.      Electronically signed by Basia Mcmillan DO on 4/21/2022 at 1:52 PM

## 2022-04-22 VITALS
RESPIRATION RATE: 18 BRPM | OXYGEN SATURATION: 96 % | WEIGHT: 210 LBS | DIASTOLIC BLOOD PRESSURE: 73 MMHG | HEIGHT: 72 IN | TEMPERATURE: 97.3 F | SYSTOLIC BLOOD PRESSURE: 148 MMHG | BODY MASS INDEX: 28.44 KG/M2 | HEART RATE: 76 BPM

## 2022-04-22 LAB
ALBUMIN SERPL-MCNC: 3.2 G/DL (ref 3.5–4.6)
ALP BLD-CCNC: 47 U/L (ref 35–104)
ALT SERPL-CCNC: 38 U/L (ref 0–41)
ANION GAP SERPL CALCULATED.3IONS-SCNC: 12 MEQ/L (ref 9–15)
AST SERPL-CCNC: 49 U/L (ref 0–40)
BASOPHILS ABSOLUTE: 0 K/UL (ref 0–0.2)
BASOPHILS RELATIVE PERCENT: 0.4 %
BILIRUB SERPL-MCNC: 0.4 MG/DL (ref 0.2–0.7)
BUN BLDV-MCNC: 8 MG/DL (ref 8–23)
CALCIUM SERPL-MCNC: 8.1 MG/DL (ref 8.5–9.9)
CHLORIDE BLD-SCNC: 107 MEQ/L (ref 95–107)
CO2: 21 MEQ/L (ref 20–31)
CREAT SERPL-MCNC: 0.67 MG/DL (ref 0.7–1.2)
EOSINOPHILS ABSOLUTE: 0 K/UL (ref 0–0.7)
EOSINOPHILS RELATIVE PERCENT: 0.9 %
GFR AFRICAN AMERICAN: >60
GFR NON-AFRICAN AMERICAN: >60
GLOBULIN: 2.5 G/DL (ref 2.3–3.5)
GLUCOSE BLD-MCNC: 93 MG/DL (ref 70–99)
HCT VFR BLD CALC: 30.7 % (ref 42–52)
HEMOGLOBIN: 10.2 G/DL (ref 14–18)
LYMPHOCYTES ABSOLUTE: 1 K/UL (ref 1–4.8)
LYMPHOCYTES RELATIVE PERCENT: 17.1 %
MAGNESIUM: 2.2 MG/DL (ref 1.7–2.4)
MCH RBC QN AUTO: 31.4 PG (ref 27–31.3)
MCHC RBC AUTO-ENTMCNC: 33.2 % (ref 33–37)
MCV RBC AUTO: 94.8 FL (ref 80–100)
MONOCYTES ABSOLUTE: 0.5 K/UL (ref 0.2–0.8)
MONOCYTES RELATIVE PERCENT: 8.6 %
NEUTROPHILS ABSOLUTE: 4.1 K/UL (ref 1.4–6.5)
NEUTROPHILS RELATIVE PERCENT: 73 %
PDW BLD-RTO: 13.4 % (ref 11.5–14.5)
PLATELET # BLD: 116 K/UL (ref 130–400)
POTASSIUM SERPL-SCNC: 4.5 MEQ/L (ref 3.4–4.9)
RBC # BLD: 3.24 M/UL (ref 4.7–6.1)
SODIUM BLD-SCNC: 140 MEQ/L (ref 135–144)
TOTAL PROTEIN: 5.7 G/DL (ref 6.3–8)
WBC # BLD: 5.6 K/UL (ref 4.8–10.8)

## 2022-04-22 PROCEDURE — 83735 ASSAY OF MAGNESIUM: CPT

## 2022-04-22 PROCEDURE — 36415 COLL VENOUS BLD VENIPUNCTURE: CPT

## 2022-04-22 PROCEDURE — 2580000003 HC RX 258: Performed by: SURGERY

## 2022-04-22 PROCEDURE — 99024 POSTOP FOLLOW-UP VISIT: CPT | Performed by: SURGERY

## 2022-04-22 PROCEDURE — 6370000000 HC RX 637 (ALT 250 FOR IP): Performed by: SURGERY

## 2022-04-22 PROCEDURE — 80053 COMPREHEN METABOLIC PANEL: CPT

## 2022-04-22 PROCEDURE — 85025 COMPLETE CBC W/AUTO DIFF WBC: CPT

## 2022-04-22 PROCEDURE — 6360000002 HC RX W HCPCS: Performed by: SURGERY

## 2022-04-22 PROCEDURE — 6360000002 HC RX W HCPCS: Performed by: INTERNAL MEDICINE

## 2022-04-22 RX ORDER — OXYCODONE HYDROCHLORIDE AND ACETAMINOPHEN 5; 325 MG/1; MG/1
1 TABLET ORAL EVERY 6 HOURS PRN
Status: DISCONTINUED | OUTPATIENT
Start: 2022-04-22 | End: 2022-04-22 | Stop reason: HOSPADM

## 2022-04-22 RX ORDER — OXYCODONE HYDROCHLORIDE AND ACETAMINOPHEN 5; 325 MG/1; MG/1
1 TABLET ORAL EVERY 6 HOURS PRN
Qty: 15 TABLET | Refills: 0 | Status: SHIPPED | OUTPATIENT
Start: 2022-04-22 | End: 2022-04-27

## 2022-04-22 RX ADMIN — LISINOPRIL 10 MG: 10 TABLET ORAL at 10:44

## 2022-04-22 RX ADMIN — CITALOPRAM HYDROBROMIDE 10 MG: 10 TABLET ORAL at 10:44

## 2022-04-22 RX ADMIN — METOCLOPRAMIDE HYDROCHLORIDE 5 MG: 5 INJECTION INTRAMUSCULAR; INTRAVENOUS at 03:57

## 2022-04-22 RX ADMIN — AMLODIPINE BESYLATE 5 MG: 5 TABLET ORAL at 10:44

## 2022-04-22 RX ADMIN — THIAMINE HYDROCHLORIDE 100 MG: 100 INJECTION, SOLUTION INTRAMUSCULAR; INTRAVENOUS at 10:45

## 2022-04-22 RX ADMIN — METOPROLOL SUCCINATE 50 MG: 50 TABLET, EXTENDED RELEASE ORAL at 10:44

## 2022-04-22 RX ADMIN — Medication 10 ML: at 10:59

## 2022-04-22 ASSESSMENT — PAIN SCALES - GENERAL
PAINLEVEL_OUTOF10: 3
PAINLEVEL_OUTOF10: 3
PAINLEVEL_OUTOF10: 5
PAINLEVEL_OUTOF10: 5

## 2022-04-22 ASSESSMENT — PAIN DESCRIPTION - LOCATION
LOCATION: ABDOMEN

## 2022-04-22 ASSESSMENT — PAIN DESCRIPTION - ORIENTATION
ORIENTATION: MID

## 2022-04-22 NOTE — PROGRESS NOTES
Pt Name: Love Claude  Medical Record Number: [id number]  Date of Birth 1950   Admit date 4/19/2022  5:46 AM  Today's Date: 4/22/2022     ASSESSMENT  1. Post op day # 3  2. Love Claude had Procedure(s):  RIGHT COLECTOMY   3. Doing well     PLAN  1. Home today  2. Patient given the results of his path report    SUBJECTIVE  Chief complaint: None  Afebrile, vital signs are stable. He denies any nausea or vomiting, has passed flatus and had a bowel movement. He is tolerating a ADULT DIET; Regular. His pain is well controlled on current medications. He has been ambulating in the halls. Path report consistent with tubular adenoma completely excised. has a past medical history of Arthritis, Hyperlipidemia, Hypertension, and Sleep apnea. CURRENT MEDS  Scheduled Meds:   amLODIPine  5 mg Oral Daily    citalopram  10 mg Oral Daily    lisinopril  10 mg Oral Daily    metoprolol succinate  50 mg Oral Daily    sodium chloride flush  5-40 mL IntraVENous 2 times per day    enoxaparin  40 mg SubCUTAneous Daily    naloxegol  25 mg Oral Daily    metoclopramide  5 mg IntraVENous Q8H    bisacodyl  10 mg Rectal Daily    sodium chloride flush  5-40 mL IntraVENous 2 times per day    thiamine  100 mg IntraVENous Daily    folic acid IVPB  1 mg IntraVENous Daily     Continuous Infusions:   fentanyl epidural builder Stopped (04/22/22 0644)    dextrose 5% and 0.45% NaCl with KCl 20 mEq 50 mL/hr at 04/21/22 1754    sodium chloride      sodium chloride       PRN Meds:.naloxone, ondansetron, sodium chloride flush, sodium chloride, sodium chloride flush, sodium chloride, LORazepam **OR** LORazepam **OR** LORazepam **OR** LORazepam **OR** LORazepam **OR** LORazepam **OR** LORazepam **OR** LORazepam    OBJECTIVE  CURRENT VITALS:  height is 6' (1.829 m) and weight is 210 lb (95.3 kg). His temperature is 98.2 °F (36.8 °C). His blood pressure is 135/77 and his pulse is 72.  His respiration is 16 and oxygen saturation is 95%. Temperature Range (24h):Temp: 98.2 °F (36.8 °C) Temp  Av.8 °F (36.6 °C)  Min: 97 °F (36.1 °C)  Max: 98.2 °F (36.8 °C)  BP Range (61A): Systolic (76OZO), LKS:878 , Min:126 , LBU:206     Diastolic (46QPB), ZXN:85, Min:58, Max:117    Pulse Range (24h): Pulse  Av.7  Min: 64  Max: 77  Respiration Range (24h): Resp  Av  Min: 14  Max: 18    GENERAL: alert, no distress  LUNGS: clear to ausculation, without wheezes, rales or rhonci  HEART: normal rate and regular rhythm  ABDOMEN: non-distended, soft, incisional tenderness, bowel sounds present in all 4 quadrants and no guarding or peritoneal signs  INCISION: healing well, no significant drainage, wearing abdominal binder no significant erythema, Aquacel dressing in place  EXTERMITY: no cyanosis, clubbing or edema    In: 3109.4 [P.O.:420; I.V.:2642.3]  Out: 2250 [Urine:2250]  Date 22 0000 - 22 2359   Shift 1876-7994 5574-9651 4032-7047 24 Hour Total   INTAKE   Shift Total(mL/kg)       OUTPUT   Urine(mL/kg/hr) 950   950   Shift Total(mL/kg) 950(10)   950(10)   Weight (kg) 95.3 95.3 95.3 95.3       LABS  Recent Labs     22  1301 22  1301 22  0507 22  0643 22  0604   WBC 8.6   < > 8.8 6.6 5.6   HGB 12.4*   < > 10.7* 9.8* 10.2*   HCT 37.5*   < > 31.9* 30.0* 30.7*      < > 151 139 116*   *  --  133* 135  --    K 4.5  --  4.4 4.5  --      --  103 105  --    CO2 20  --  18* 20  --    BUN 12  --  10 9  --    CREATININE 0.73  --  0.72 0.72  --    MG 2.1  --  2.1 2.3  --    PHOS 3.5  --  2.9 3.0  --    CALCIUM 8.9  --  8.1* 7.9*  --     < > = values in this interval not displayed. No results for input(s): PTT, INR in the last 72 hours.     Invalid input(s): PT  Recent Labs     22  1301 22  0507 22  0643   AST 25 19 53*   ALT 17 13 31   BILITOT 0.6 0.4 0.3       RADIOLOGY  CT ABDOMEN PELVIS W IV CONTRAST    Result Date: 3/25/2022  CT of the Abdomen and Pelvis with intravenous contrast medium History:    Cecal mass Technical Factors: CT imaging of the abdomen and pelvis were obtained and formatted as 5 mm contiguous axial images from the domes of the diaphragm to the symphysis pubis. Sagittal and coronal reconstructions were also obtained. Oral contrast medium:  Barium sulfate, 450 mL. Intravenous contrast medium: Isovue-300, 100 mL Comparison:  CT abdomen pelvis, November 1, 2014. Findings: Study limited secondary to beam hardening artifact from bilateral hip replacements. Lungs:  Lung bases are clear Liver:  Normal in size, shape, and attenuation. Bile Ducts:  Normal in caliber. Gallbladder:  No stones or wall thickening. Pancreas:  Normal without masses, cysts, ductal dilatation or calcification. Spleen:  Normal in size without masses or calcifications. No splenules. Kidneys:  Normal in size and enhancement. No hydronephrosis, masses, or stones. Adrenals:  Normal. Small bowel:  Normal in caliber. Appendix:  Normal. Colon:  Portion of cecum and rectum obscured by beam hardening artifact. Circumferential wall thickening of cecum (series 2, image 62, series 601, image 40). Diverticular change, proximal sigmoid colon. Peritoneum:  No ascites, free air, or fluid collections. Vessels: Aorta normal in course and caliber. Portal vein, splenic vein, superior mesenteric vein are patent. Lymph nodes:  Retroperitoneal:  No enlarged retroperitoneal lymph nodes. Mesenteric:  No enlarged mesenteric lymph nodes. Pelvic: No enlarged pelvic lymph nodes. Ureters: Normal in course and caliber. No calcifications. Bladder: Obscured by beam hardening artifact. Abdominal Wall:  No hernia identified. No diastasis of rectus musculature. No edema or masses. Bones:  No bone lesions. Diffuse disc space narrowing lumbar spine, greatest at L3-4 through L5-S1. Large prominent bridging anterior osteophytes, L2-L3, and to lesser degree, L1-L2. Bipolar bilateral hip replacement.      Limitations as discussed. Circumferential wall thickening in patient with clinical history of cecal mass. Sigmoid diverticulosis. Other findings discussed. All CT scans at this facility use dose modulation, iterative reconstruction, and/or weight based dosing when appropriate to reduce radiation dose to as low as reasonably achievable.      Electronically signed by Shannan Fink MD on 4/22/2022 at 7:28 AM

## 2022-04-22 NOTE — PROGRESS NOTES
Progress Note  Date:2022       Room:[place] [address]  Patient Name:Zheng De La Rosa     YOB: 1950     Age:71 y.o. Subjective    No acute events overnight. Hgb 9.8 down from 10.7 down from 12.4. No active bleeding noted. Continues on epidural analgesia per primary team, anticipated to be removed . No other new/acute complaints at present. No flatus yet since surgery. Objective         Vitals Last 24 Hours:  TEMPERATURE:  Temp  Av.9 °F (36.6 °C)  Min: 97.3 °F (36.3 °C)  Max: 98.2 °F (36.8 °C)  RESPIRATIONS RANGE: Resp  Av.8  Min: 14  Max: 18  PULSE OXIMETRY RANGE: SpO2  Av.2 %  Min: 94 %  Max: 96 %  PULSE RANGE: Pulse  Av.2  Min: 71  Max: 77  BLOOD PRESSURE RANGE: Systolic (37IWI), EXS:696 , Min:133 , ELF:049   ; Diastolic (64QTB), ZJR:30, Min:58, Max:77    I/O (24Hr): Intake/Output Summary (Last 24 hours) at 2022 1441  Last data filed at 2022 0402  Gross per 24 hour   Intake 706.42 ml   Output 1350 ml   Net -643.58 ml     Objective:  Vital signs: (most recent): Blood pressure (!) 148/73, pulse 76, temperature 97.3 °F (36.3 °C), temperature source Oral, resp. rate 18, height 6' (1.829 m), weight 210 lb (95.3 kg), SpO2 96 %. Constitutional: Elderly male reclining in bed no acute distress. Head: NCAT  Eyes: PERRLA, EOMI  Neck: Trachea midline, phonation normal  Cardiovascular: RRR. Warm and well perfused peripherally. No significant murmurs appreciated. No pretibial edema. Pulmonary: Normal rate and effort of respiration on room air. Grossly clear to auscultation bilaterally. Abdomen: Soft, nontense, non-distended. Postoperative surgical site dressing over vertical incision just right of midline, C/D/I. Active bowel sounds today. Neurologic: Grossly alert and oriented. No focal neurologic deficits appreciated. No tremors appreciated. Psychiatric: Pleasant, calm, cooperative.     Labs/Imaging/Diagnostics    Labs:  CBC:  Recent Labs 04/20/22  0507 04/21/22  0643 04/22/22  0604   WBC 8.8 6.6 5.6   RBC 3.37* 3.14* 3.24*   HGB 10.7* 9.8* 10.2*   HCT 31.9* 30.0* 30.7*   MCV 94.7 95.4 94.8   RDW 13.5 13.3 13.4    139 116*     CHEMISTRIES:  Recent Labs     04/20/22  0507 04/21/22  0643 04/22/22  0604   * 135 140   K 4.4 4.5 4.5    105 107   CO2 18* 20 21   BUN 10 9 8   CREATININE 0.72 0.72 0.67*   GLUCOSE 128* 97 93   PHOS 2.9 3.0  --    MG 2.1 2.3 2.2     PT/INR:No results for input(s): PROTIME, INR in the last 72 hours. APTT:No results for input(s): APTT in the last 72 hours. LIVER PROFILE:  Recent Labs     04/20/22  0507 04/21/22  0643 04/22/22  0604   AST 19 53* 49*   ALT 13 31 38   BILITOT 0.4 0.3 0.4   ALKPHOS 45 45 47       Imaging Last 24 Hours:  No results found. Assessment//Plan           Hospital Problems           Last Modified POA    * (Principal) Colonic mass 4/19/2022 Yes    Cecum mass 4/19/2022 Yes        Assessment & Plan    Active problems:  · Right hemicolectomy with primary anastomosis for tubular adenoma     Chronic problems:  · Alcohol dependence without prior withdrawal  · MUNA on home CPAP  · Hypertension  · Hyperlipidemia  · Osteoarthritis        Plan:  · Postoperative management per primary team  · WA protocol for alcohol withdrawal prophylaxis  · Daily labs to include CBC, CMP, magnesium. · Check phosphorus now next a.m.  · Home CPAP brought in by wife. Patient to wear nightly while sleeping. · Continue/hold home medications as ordered. 4/21: Surgical primary team planning for removal of epidural and likely discharge on 4/22. Hemoglobin continues to downtrend very slowly, but not actively dropping and no active external bleeding, suspected just delayed measurability of perioperative anemia. Patient appears medically appropriate for discharge when cleared for discharge by primary team.  4/22: Patient doing well overnight. Labs reassuring. Seems medically appropriate for DC. Electronically signed by Dale Laura DO on 4/22/2022 at 0942hrs.

## 2022-04-22 NOTE — CARE COORDINATION
MET WITH PATIENT AT BEDSIDE TO DISCUSS D/C PLAN. PATIENT PLANS TO D/C HOME WITH WIFE. HE SAID THAT HE HAS GOOD SUPPORT AND DENIES ANY D/C NEEDS. SECOND IMM REVIEWED WITH PATIENT. FILED IN CHART.

## 2022-04-22 NOTE — DISCHARGE SUMMARY
Pt Name: Julee Hunter  MRN: [id number]  YOB: 1950  Primary Care Physician: Savannah Starr MD  Admit date:  4/19/2022  5:46 AM  Discharge date: 4/22/2022  Disposition: home  Admitting Diagnosis:   1. 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 9) E66.9    Adenomatous polyp of colon D12.6    Cecum mass K63.89    Colonic mass K63.89     Consultants:  none  Procedures/Diagnostic Test: Right colectomy 4/19/2022  Hospital Course: Davion Zimmerman originally presented to the hospital on 4/19/2022  5:46 AM for a elective right colectomy for cecal mass. He was taken to surgery on 4/19/2022 and underwent uneventful right colectomy. His postoperative course was unremarkable. His epidural and Corral removed and he was doing well. 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 At time of discharge, Davion Zimmerman was tolerating a regular diet, having bowel movements,ambulating on his own accord and had adequate analgesia on oral pain medications, and had no signs of symptoms of complications. PHYSICAL EXAMINATION   Discharge Vitals:  height is 6' (1.829 m) and weight is 210 lb (95.3 kg). His oral temperature is 97.3 °F (36.3 °C). His blood pressure is 148/73 (abnormal) and his pulse is 76. His respiration is 18 and oxygen saturation is 96%.    General appearance - alert, well appearing, and in no distress  Chest - clear to ausculation  Heart - normal rate and regular rhythm  Abdomen - soft, incisional tenderness only, bowel sounds present, wearing an abdominal binder  Neurological - motor and sensory grossly normal bilaterally  Musculoskeletal - full range of motion without pain  Extremities - peripheral pulses normal, no pedal edema, no clubbing or cyanosis  Incision: healing well, no drainage, Aquacel dressing in place  LABS     Recent Labs     04/22/22  0604   WBC 5.6   HGB 10.2*   HCT 30.7*   *      K 4.5      CO2 21   BUN 8   CREATININE 0.67*     DISCHARGE INSTRUCTIONS   Discharge Medications:      Medication List      START taking these medications    oxyCODONE-acetaminophen 5-325 MG per tablet  Commonly known as: PERCOCET  Take 1 tablet by mouth every 6 hours as needed for Pain for up to 5 days. CHANGE how you take these medications    lisinopril 10 MG tablet  Commonly known as: PRINIVIL;ZESTRIL  Take 1 tablet by mouth daily  What changed: how much to take        CONTINUE taking these medications    amLODIPine 5 MG tablet  Commonly known as: NORVASC  Take 1 tablet by mouth daily     citalopram 10 MG tablet  Commonly known as: CeleXA  Take 1 tablet by mouth daily     CPAP Machine Misc  by Does not apply route New CPAP with 5 and and CPAP supply.      CPAP Machine Misc     metoprolol succinate 50 MG extended release tablet  Commonly known as: TOPROL XL  Take 1 tablet by mouth daily     Respiratory Therapy Supplies Barb  New CPAP mask and supplies     rosuvastatin 5 MG tablet  Commonly known as: CRESTOR  Take 1 tablet by mouth daily        STOP taking these medications    aspirin 81 MG EC tablet           Where to Get Your Medications      These medications were sent to [facility] , [address] [facility], [address] [facility] [address], [address], [address] [address]    Phone: [phone]   · oxyCODONE-acetaminophen 5-325 MG per tablet       Diet: diet as tolerated  Activity: no lifting more than 10-20 lbs for next two weeks  Wound Care: Keep Aquacel dressing in place  Follow-up:  Follow up with Angelia Moore MD in 1 weeks.   Time Spent for discharge: 20 minutes

## 2022-04-22 NOTE — ADDENDUM NOTE
Addendum  created 04/22/22 0800 by BILL Saldana CRNA    Attestation recorded in [facility], [address] [facility] accepted, Intraprocedure Attestations filed, [facility] properties accepted

## 2022-04-22 NOTE — PROGRESS NOTES
Epidural removed this Am by anesthesia. Waste 7.5ml with [employer]. Pt urinated-- post rodriguez. Pt denies needing percocet for pain. Eating a regular diet. Up independent in room. Had 3 BMs this Am. Discharge instructions provided. States understanding. IV removed. Pts wife called by pt and is aware to come pick him up.  Transport will be set up once wife is closer to hospital.    Electronically signed by Rodrigo Henderson RN on 4/22/2022 at 11:35 AM

## 2022-04-29 ENCOUNTER — OFFICE VISIT (OUTPATIENT)
Dept: SURGERY | Age: 72
End: 2022-04-29

## 2022-04-29 VITALS
TEMPERATURE: 97.6 F | DIASTOLIC BLOOD PRESSURE: 68 MMHG | WEIGHT: 207 LBS | BODY MASS INDEX: 28.04 KG/M2 | SYSTOLIC BLOOD PRESSURE: 112 MMHG | HEIGHT: 72 IN

## 2022-04-29 DIAGNOSIS — K63.89 CECUM MASS: Primary | ICD-10-CM

## 2022-04-29 PROCEDURE — 99024 POSTOP FOLLOW-UP VISIT: CPT | Performed by: SURGERY

## 2022-04-29 NOTE — PROGRESS NOTES
Chen Rosales (:  1950) is a 70 y.o. male,Established patient, here for evaluation of the following chief complaint(s):  Post-Op Check (post op, Rt colectomy)         ASSESSMENT/PLAN:  Doing very well       Removed Aquacel dressing  Return visit 1 month  Remind her to wear his abdominal binder when he is doing strenuous activity. Subjective   SUBJECTIVE/OBJECTIVE:  HPI  Chen Rosales returns in follow up of a right hemicolectomy done 2022 for a polyp of the cecum. The post operative course course has been uneventful. The patient denies rectal bleeding. Appetite Good. Pain control is excellent. The patient is using Tylenol. Bowel movements are 2 per day and are formed. The pathology report showed a sessile tubulovillous adenoma. .   Pt is currently at Home and convalescing very well. His wife was present the entire visit. .     Review of Systems       Objective   Physical Exam  Constitutional:       General: He is not in acute distress. Appearance: Normal appearance. HENT:      Mouth/Throat:      Mouth: Mucous membranes are moist.      Pharynx: Oropharynx is clear. Eyes:      Pupils: Pupils are equal, round, and reactive to light. Neck:      Comments: Neck is supple without any masses, no thyromegaly, trachea midline  Abdominal:          Comments: Incision is well approximated, erythema is not present, swelling is minimal, no evidence of hernia, mild tenderness, no drainage present, skin glue/sutures present. Aquacel dressing in place and wearing abdominal binder   Musculoskeletal:      Comments: Normal gait   Skin:     Findings: No bruising, lesion or rash. Neurological:      Mental Status: He is alert and oriented to person, place, and time.    Psychiatric:         Mood and Affect: Mood normal.         Judgment: Judgment normal.         /68   Temp 97.6 °F (36.4 °C)   Ht 6' (1.829 m)   Wt 207 lb (93.9 kg)   BMI 28.07 kg/m²           An electronic signature was used

## 2022-06-06 ENCOUNTER — TELEPHONE (OUTPATIENT)
Dept: PULMONOLOGY | Age: 72
End: 2022-06-06

## 2022-06-06 ENCOUNTER — OFFICE VISIT (OUTPATIENT)
Dept: SURGERY | Age: 72
End: 2022-06-06

## 2022-06-06 VITALS
WEIGHT: 207 LBS | BODY MASS INDEX: 28.04 KG/M2 | TEMPERATURE: 98 F | DIASTOLIC BLOOD PRESSURE: 70 MMHG | SYSTOLIC BLOOD PRESSURE: 130 MMHG | HEIGHT: 72 IN

## 2022-06-06 DIAGNOSIS — K63.89 CECUM MASS: Primary | ICD-10-CM

## 2022-06-06 PROCEDURE — 99024 POSTOP FOLLOW-UP VISIT: CPT | Performed by: SURGERY

## 2022-06-06 NOTE — PROGRESS NOTES
Ayleen Potter (:  1950) is a 70 y.o. male,Established patient, here for evaluation of the following chief complaint(s):  Post-Op Check (post op Rt colectomy)         ASSESSMENT/PLAN:  Doing very well         Return visit as needed  Remind her to wear his abdominal binder when he is doing strenuous activity. Subjective   SUBJECTIVE/OBJECTIVE:  HPI  Ayleen Potter returns in follow up of a right hemicolectomy done 2022 for a polyp of the cecum. The post operative course course has been uneventful. The patient denies rectal bleeding. Appetite Good. Pain control is excellent. The patient is using Tylenol. Bowel movements are 2 per day and are formed. The pathology report showed a sessile tubulovillous adenoma. .   Pt is currently at Home and convalescing very well. Review of Systems       Objective   Physical Exam  Constitutional:       General: He is not in acute distress. Appearance: Normal appearance. HENT:      Mouth/Throat:      Mouth: Mucous membranes are moist.      Pharynx: Oropharynx is clear. Eyes:      Pupils: Pupils are equal, round, and reactive to light. Neck:      Comments: Neck is supple without any masses, no thyromegaly, trachea midline  Abdominal:          Comments: Incision is well approximated, erythema is not present, swelling is minimal, no evidence of hernia, mild tenderness, no drainage present, skin glue/sutures present. wearing abdominal binder   Musculoskeletal:      Comments: Normal gait   Skin:     Findings: No bruising, lesion or rash. Neurological:      Mental Status: He is alert and oriented to person, place, and time. Psychiatric:         Mood and Affect: Mood normal.         Judgment: Judgment normal.         /70   Temp 98 °F (36.7 °C)   Ht 6' (1.829 m)   Wt 207 lb (93.9 kg)   BMI 28.07 kg/m²           An electronic signature was used to authenticate this note.     --Kenny Caraballo MD

## 2022-06-06 NOTE — TELEPHONE ENCOUNTER
PT CALLED IN TO THE OFFICE BECAUSE HE WAS SEEN IN December DR JEAN WAS GOING TO SEND A RX FOR A NEW CPAP MACHINE AND SUPPLIES TO [address] [place] [address].  RX IS NOT IN MEDIA PT IS ASKING FOR A RX TO BE SENT.         LOV-12/15/21        NOV- 12/15/22        PLEASE ADVISE

## 2022-12-28 ENCOUNTER — OFFICE VISIT (OUTPATIENT)
Dept: PULMONOLOGY | Age: 72
End: 2022-12-28
Payer: MEDICARE

## 2022-12-28 VITALS
HEART RATE: 85 BPM | OXYGEN SATURATION: 97 % | BODY MASS INDEX: 28.21 KG/M2 | DIASTOLIC BLOOD PRESSURE: 84 MMHG | WEIGHT: 208 LBS | SYSTOLIC BLOOD PRESSURE: 152 MMHG

## 2022-12-28 DIAGNOSIS — G47.33 OSA (OBSTRUCTIVE SLEEP APNEA): Primary | ICD-10-CM

## 2022-12-28 DIAGNOSIS — E66.3 OVERWEIGHT (BMI 25.0-29.9): ICD-10-CM

## 2022-12-28 PROCEDURE — 1123F ACP DISCUSS/DSCN MKR DOCD: CPT | Performed by: INTERNAL MEDICINE

## 2022-12-28 PROCEDURE — 3074F SYST BP LT 130 MM HG: CPT | Performed by: INTERNAL MEDICINE

## 2022-12-28 PROCEDURE — 3078F DIAST BP <80 MM HG: CPT | Performed by: INTERNAL MEDICINE

## 2022-12-28 PROCEDURE — 1036F TOBACCO NON-USER: CPT | Performed by: INTERNAL MEDICINE

## 2022-12-28 PROCEDURE — 99214 OFFICE O/P EST MOD 30 MIN: CPT | Performed by: INTERNAL MEDICINE

## 2022-12-28 PROCEDURE — G8417 CALC BMI ABV UP PARAM F/U: HCPCS | Performed by: INTERNAL MEDICINE

## 2022-12-28 PROCEDURE — 3017F COLORECTAL CA SCREEN DOC REV: CPT | Performed by: INTERNAL MEDICINE

## 2022-12-28 PROCEDURE — G8484 FLU IMMUNIZE NO ADMIN: HCPCS | Performed by: INTERNAL MEDICINE

## 2022-12-28 PROCEDURE — G8427 DOCREV CUR MEDS BY ELIG CLIN: HCPCS | Performed by: INTERNAL MEDICINE

## 2022-12-28 RX ORDER — LISINOPRIL 20 MG/1
TABLET ORAL
COMMUNITY
Start: 2022-12-27

## 2022-12-28 ASSESSMENT — ENCOUNTER SYMPTOMS
ABDOMINAL PAIN: 0
SORE THROAT: 0
VOICE CHANGE: 0
NAUSEA: 0
COUGH: 0
CHEST TIGHTNESS: 0
DIARRHEA: 0
VOMITING: 0
RHINORRHEA: 0
EYE ITCHING: 0
WHEEZING: 0
SHORTNESS OF BREATH: 0

## 2022-12-28 NOTE — PROGRESS NOTES
Subjective:     Tita Ferrer is a 67 y.o. male who complains today of:     Chief Complaint   Patient presents with    Follow-up     1 year f/u for MUNA       HPI  He is using CPAP with  5 centimeters of H2O with heated humidity. He has CPAP more than 6years old and he want new CPAP. He said he got his CPAP in 2014   He is using CPAP for about 9 hours every night. He is using CPAP with  Nasal  Mask. He said  sleep is restful with the CPAP use. He is compliant with CPAP therapy and benefiting with CPAP use. No snoring or  daytime sleepiness or tiredness with CPAP use. He denies taking naps. No sleepiness with driving. He denies difficulty falling asleep or staying asleep.     Allergies:  Seasonal and Azithromycin  Past Medical History:   Diagnosis Date    Arthritis     hips    Hyperlipidemia     meds > 15 yrs    Hypertension     meds > 10 yrs    Sleep apnea      Past Surgical History:   Procedure Laterality Date    BACK SURGERY      CATARACT REMOVAL Left 03/08/2017    CERVICAL FUSION  1998    COLONOSCOPY  03/15/2016    polyps diverticilosis, hemorrhoids    COLONOSCOPY  2013    COLONOSCOPY N/A 3/10/2022    COLORECTAL CANCER SCREENING, HIGH RISK performed by Kathleen Martin MD at [facility] with IOL OU    EYE SURGERY      left repair detached retina    EYE SURGERY      right laser for retinal tear    HEMICOLECTOMY Right 4/19/2022    RIGHT COLECTOMY performed by Kenn Murphy MD at [address] [place] [address].      julio hip replacements    LAMINECTOMY  1988    L4    UT TOTAL HIP ARTHROPLASTY Right 8/3/2018    RIGHT TOTAL HIP ARTHROPLASTY performed by Elaine Ward MD at [facility] Left 11/16/2018    LEFT TOTAL HIP ARTHROPLASTY, LATERAL DECUB, JOY 62 TM CUP 13 STEM EXTENDED OFFSET 36 HEAD O NECK performed by Elaine Ward MD at [address] [place] [address] chest squamous cell cancer    TONSILLECTOMY      as child     Family History   Problem Relation Age of Onset    Breast Cancer Mother     Cancer Father         kidney cancer    Stroke Father     Heart Disease Father         2-3 vessel bypass    Arthritis Father     No Known Problems Sister     Arthritis Brother     Cancer Brother         prostate cancer    High Blood Pressure Brother     Depression Daughter     Other Daughter         fibromyalgia    Thyroid Disease Daughter     Sleep Apnea Daughter     Colon Cancer Neg Hx      Social History     Socioeconomic History    Marital status:      Spouse name: Not on file    Number of children: Not on file    Years of education: Not on file    Highest education level: Not on file   Occupational History    Not on file   Tobacco Use    Smoking status: Former     Packs/day: 1.00     Years: 2.00     Pack years: 2.00     Types: Cigarettes     Quit date: 1976     Years since quittin.9    Smokeless tobacco: Never    Tobacco comments:     smoked at age 25s x 2 yrs / 1ppd   Vaping Use    Vaping Use: Never used   Substance and Sexual Activity    Alcohol use: Yes     Alcohol/week: 0.0 standard drinks     Comment: 3-4 glasses wine per day    Drug use: No    Sexual activity: Not on file   Other Topics Concern    Not on file   Social History Narrative    Not on file     Social Determinants of Health     Financial Resource Strain: Not on file   Food Insecurity: Not on file   Transportation Needs: Not on file   Physical Activity: Not on file   Stress: Not on file   Social Connections: Not on file   Intimate Partner Violence: Not on file   Housing Stability: Not on file         Review of Systems   Constitutional:  Negative for chills, diaphoresis, fatigue and fever. HENT:  Negative for congestion, mouth sores, nosebleeds, postnasal drip, rhinorrhea, sneezing, sore throat and voice change. Eyes:  Negative for itching and visual disturbance. Respiratory:  Negative for cough, chest tightness, shortness of breath and wheezing. Cardiovascular: Negative. Negative for chest pain, palpitations and leg swelling. Gastrointestinal:  Negative for abdominal pain, diarrhea, nausea and vomiting. Genitourinary:  Negative for difficulty urinating and hematuria. Musculoskeletal:  Negative for arthralgias, joint swelling and myalgias. Skin:  Negative for rash. Allergic/Immunologic: Negative for environmental allergies. Neurological:  Negative for dizziness, tremors, weakness and headaches. Psychiatric/Behavioral:  Positive for sleep disturbance. Negative for behavioral problems. :     Vitals:    12/28/22 1007 12/28/22 1010   BP: (!) 168/80 (!) 152/84   Site: Right Upper Arm Left Upper Arm   Position: Sitting Sitting   Cuff Size: Large Adult Large Adult   Pulse: 85    SpO2: 97%    Weight: 208 lb (94.3 kg)      Wt Readings from Last 3 Encounters:   12/28/22 208 lb (94.3 kg)   06/06/22 207 lb (93.9 kg)   04/29/22 207 lb (93.9 kg)         Physical Exam  Constitutional:       Appearance: He is well-developed. HENT:      Head: Normocephalic and atraumatic. Nose: Nose normal.   Eyes:      Conjunctiva/sclera: Conjunctivae normal.      Pupils: Pupils are equal, round, and reactive to light. Neck:      Thyroid: No thyromegaly. Vascular: No JVD. Trachea: No tracheal deviation. Cardiovascular:      Rate and Rhythm: Normal rate and regular rhythm. Heart sounds: No murmur heard. No friction rub. No gallop. Pulmonary:      Effort: Pulmonary effort is normal. No respiratory distress. Breath sounds: Normal breath sounds. No wheezing or rales. Chest:      Chest wall: No tenderness. Abdominal:      General: There is no distension. Musculoskeletal:         General: Normal range of motion. Lymphadenopathy:      Cervical: No cervical adenopathy. Skin:     General: Skin is warm and dry. Findings: No rash. Neurological:      Mental Status: He is alert and oriented to person, place, and time. Cranial Nerves: No cranial nerve deficit. Psychiatric:         Behavior: Behavior normal.               Assessment/Plan:     1. MUNA (obstructive sleep apnea)  He is using CPAP with  5 centimeters of H2O with heated humidity. He has CPAP more than 6years old and he want new CPAP. He said he got his CPAP in 2014 . He is using CPAP for about 9 hours every night. He is using CPAP with  Nasal  Mask. He said  sleep is restful with the CPAP use. He is compliant with CPAP therapy and benefiting with CPAP use. continue CPAP as before     - New CPAP with 5 cm     Counseling: CPAP/BiPAP uses, He advised to use CPAP at least 5-6 hours every night. Driving: He is advised for extreme caution when driving or operating machinery if there is a feeling of drowsiness, especially while driving it is preferable to stop driving and take a brief nap. Sleep hygiene:Avoid supine sleep, sleep on  sides. Avoid  sleep deprivation. Explained sleep hygiene. Advice to avoid Alcohol and sedative    Time spend over 30 min. Face to face. with greater than 50 % time with CPAP therapy including review compliance, counseling and advised regarding CPAP therapy. 2. Overweight (BMI 25.0-29. 9)  He is advised try to lose weight. obesity related risk explained to the patient ,  Current weight:  208 lb (94.3 kg) Lbs. BMI:  Body mass index is 28.21 kg/m². Suggested weight control approaches, including dietary changes , exercise, behavioral modification. Return in about 6 months (around 6/28/2023) for muna.       Tanya Urbina MD

## 2023-02-22 PROBLEM — D36.9 TUBULAR ADENOMA: Status: ACTIVE | Noted: 2023-02-22

## 2023-02-22 PROBLEM — I10 HTN (HYPERTENSION): Status: ACTIVE | Noted: 2023-02-22

## 2023-02-22 PROBLEM — E66.3 OVERWEIGHT WITH BODY MASS INDEX (BMI) OF 29 TO 29.9 IN ADULT: Status: ACTIVE | Noted: 2023-02-22

## 2023-02-22 PROBLEM — J30.2 SEASONAL ALLERGIC RHINITIS: Status: ACTIVE | Noted: 2023-02-22

## 2023-02-22 PROBLEM — E78.5 HYPERLIPIDEMIA: Status: ACTIVE | Noted: 2023-02-22

## 2023-02-22 PROBLEM — M19.90 DJD (DEGENERATIVE JOINT DISEASE): Status: ACTIVE | Noted: 2023-02-22

## 2023-02-22 PROBLEM — N52.9 ERECTILE DYSFUNCTION: Status: ACTIVE | Noted: 2023-02-22

## 2023-02-22 PROBLEM — K64.4 EXTERNAL HEMORRHOID: Status: ACTIVE | Noted: 2023-02-22

## 2023-02-22 RX ORDER — CITALOPRAM 20 MG/1
20 TABLET, FILM COATED ORAL DAILY
COMMUNITY
End: 2023-12-12 | Stop reason: SDUPTHER

## 2023-02-22 RX ORDER — AMLODIPINE BESYLATE 5 MG/1
1 TABLET ORAL DAILY
COMMUNITY
Start: 2020-07-07 | End: 2023-04-24 | Stop reason: SDUPTHER

## 2023-02-22 RX ORDER — ROSUVASTATIN CALCIUM 5 MG/1
1 TABLET, COATED ORAL DAILY
COMMUNITY
End: 2024-01-12 | Stop reason: SDUPTHER

## 2023-02-22 RX ORDER — LISINOPRIL 20 MG/1
1 TABLET ORAL DAILY
COMMUNITY
End: 2024-04-19 | Stop reason: SDUPTHER

## 2023-02-22 RX ORDER — METOPROLOL SUCCINATE 50 MG/1
1 TABLET, EXTENDED RELEASE ORAL DAILY
COMMUNITY
Start: 2019-07-06 | End: 2023-04-24 | Stop reason: SDUPTHER

## 2023-02-22 RX ORDER — SILDENAFIL 50 MG/1
TABLET, FILM COATED ORAL
COMMUNITY
Start: 2021-08-09

## 2023-03-07 ENCOUNTER — OFFICE VISIT (OUTPATIENT)
Dept: PULMONOLOGY | Age: 73
End: 2023-03-07
Payer: MEDICARE

## 2023-03-07 VITALS
HEART RATE: 78 BPM | BODY MASS INDEX: 28.89 KG/M2 | OXYGEN SATURATION: 97 % | WEIGHT: 213 LBS | TEMPERATURE: 97.3 F | DIASTOLIC BLOOD PRESSURE: 68 MMHG | SYSTOLIC BLOOD PRESSURE: 146 MMHG

## 2023-03-07 DIAGNOSIS — G47.33 OSA (OBSTRUCTIVE SLEEP APNEA): Primary | ICD-10-CM

## 2023-03-07 DIAGNOSIS — E66.3 OVERWEIGHT (BMI 25.0-29.9): ICD-10-CM

## 2023-03-07 PROCEDURE — 99214 OFFICE O/P EST MOD 30 MIN: CPT | Performed by: INTERNAL MEDICINE

## 2023-03-07 PROCEDURE — 1123F ACP DISCUSS/DSCN MKR DOCD: CPT | Performed by: INTERNAL MEDICINE

## 2023-03-07 PROCEDURE — 3078F DIAST BP <80 MM HG: CPT | Performed by: INTERNAL MEDICINE

## 2023-03-07 PROCEDURE — 3077F SYST BP >= 140 MM HG: CPT | Performed by: INTERNAL MEDICINE

## 2023-03-07 RX ORDER — CITALOPRAM 20 MG/1
TABLET ORAL
COMMUNITY
Start: 2023-01-31

## 2023-03-07 ASSESSMENT — ENCOUNTER SYMPTOMS
DIARRHEA: 0
EYE ITCHING: 0
NAUSEA: 0
ABDOMINAL PAIN: 0
VOICE CHANGE: 0
SORE THROAT: 0
VOMITING: 0
SHORTNESS OF BREATH: 0
COUGH: 0
WHEEZING: 0
CHEST TIGHTNESS: 0
RHINORRHEA: 0

## 2023-03-07 NOTE — PROGRESS NOTES
Subjective:     Maulik Martínez is a 67 y.o. male who complains today of:     Chief Complaint   Patient presents with    Follow-up     3m f/u on new CPAP    Sleep Apnea       HPI  He is using CPAP with  5 centimeters of H2O with heated humidity. He said he has new CPAP machine   He is using CPAP for about 9 hours every night. He is using CPAP with  Nasal  Mask. He said  sleep is restful with the CPAP use. He is compliant with CPAP therapy and benefiting with CPAP use. No snoring or  daytime sleepiness or tiredness with CPAP use. He denies taking naps. No sleepiness with driving. He denies difficulty falling asleep or staying asleep. I reviewed compliance report with patient regarding CPAP therapy. He is using  CPAP for 30 days out of 30 days  Average usage of days used is 8 hours and 16 min , average AHI 0.7 with CPAP use.            Allergies:  Seasonal and Azithromycin  Past Medical History:   Diagnosis Date    Arthritis     hips    Hyperlipidemia     meds > 15 yrs    Hypertension     meds > 10 yrs    Sleep apnea      Past Surgical History:   Procedure Laterality Date    BACK SURGERY      CATARACT REMOVAL Left 03/08/2017    CERVICAL FUSION  1998    COLONOSCOPY  03/15/2016    polyps diverticilosis, hemorrhoids    COLONOSCOPY  2013    COLONOSCOPY N/A 3/10/2022    COLORECTAL CANCER SCREENING, HIGH RISK performed by Shun Smith MD at [facility] with IOL OU    EYE SURGERY      left repair detached retina    EYE SURGERY      right laser for retinal tear    HEMICOLECTOMY Right 4/19/2022    RIGHT COLECTOMY performed by Emil Mobley MD at [facility].      julio hip replacements    LAMINECTOMY  1988    L4    [place] TOTAL HIP ARTHROPLASTY Right 8/3/2018    RIGHT TOTAL HIP ARTHROPLASTY performed by Roger Damico MD at [address] Left 11/16/2018    LEFT TOTAL HIP ARTHROPLASTY, LATERAL DECUB, JOY 62 TM CUP 13 STEM EXTENDED OFFSET [address] [place] [address] performed by Anna De Guzman MD at [address] [place] [address]      aterior chest squamous cell cancer    TONSILLECTOMY      as child     Family History   Problem Relation Age of Onset    Breast Cancer Mother     Cancer Father         kidney cancer    Stroke Father     Heart Disease Father         2-3 vessel bypass    Arthritis Father     No Known Problems Sister     Arthritis Brother     Cancer Brother         prostate cancer    High Blood Pressure Brother     Depression Daughter     Other Daughter         fibromyalgia    Thyroid Disease Daughter     Sleep Apnea Daughter     Colon Cancer Neg Hx      Social History     Socioeconomic History    Marital status:      Spouse name: Not on file    Number of children: Not on file    Years of education: Not on file    Highest education level: Not on file   Occupational History    Not on file   Tobacco Use    Smoking status: Former     Packs/day: 1.00     Years: 2.00     Pack years: 2.00     Types: Cigarettes     Quit date: 1976     Years since quittin.1    Smokeless tobacco: Never    Tobacco comments:     smoked at age 25s x 2 yrs / 1ppd   Vaping Use    Vaping Use: Never used   Substance and Sexual Activity    Alcohol use: Yes     Alcohol/week: 0.0 standard drinks     Comment: 3-4 glasses wine per day    Drug use: No    Sexual activity: Not on file   Other Topics Concern    Not on file   Social History Narrative    Not on file     Social Determinants of Health     Financial Resource Strain: Not on file   Food Insecurity: Not on file   Transportation Needs: Not on file   Physical Activity: Not on file   Stress: Not on file   Social Connections: Not on file   Intimate Partner Violence: Not on file   Housing Stability: Not on file         Review of Systems   Constitutional:  Negative for chills, diaphoresis, fatigue and fever.    HENT:  Negative for congestion, mouth sores, nosebleeds, postnasal drip, rhinorrhea, sneezing, sore throat and voice change. Eyes:  Negative for itching and visual disturbance. Respiratory:  Negative for cough, chest tightness, shortness of breath and wheezing. Cardiovascular: Negative. Negative for chest pain, palpitations and leg swelling. Gastrointestinal:  Negative for abdominal pain, diarrhea, nausea and vomiting. Genitourinary:  Negative for difficulty urinating and hematuria. Musculoskeletal:  Negative for arthralgias, joint swelling and myalgias. Skin:  Negative for rash. Allergic/Immunologic: Negative for environmental allergies. Neurological:  Negative for dizziness, tremors, weakness and headaches. Psychiatric/Behavioral:  Positive for sleep disturbance. Negative for behavioral problems. :     Vitals:    03/07/23 1134 03/07/23 1137   BP: (!) 154/70 (!) 146/68   Site: Right Upper Arm Left Upper Arm   Position: Sitting Sitting   Cuff Size: Large Adult Large Adult   Pulse: 78    Temp: 97.3 °F (36.3 °C)    SpO2: 97%    Weight: 213 lb (96.6 kg)      Wt Readings from Last 3 Encounters:   03/07/23 213 lb (96.6 kg)   12/28/22 208 lb (94.3 kg)   06/06/22 207 lb (93.9 kg)         Physical Exam  Constitutional:       Appearance: He is well-developed. HENT:      Head: Normocephalic and atraumatic. Nose: Nose normal.   Eyes:      Conjunctiva/sclera: Conjunctivae normal.      Pupils: Pupils are equal, round, and reactive to light. Neck:      Thyroid: No thyromegaly. Vascular: No JVD. Trachea: No tracheal deviation. Cardiovascular:      Rate and Rhythm: Normal rate and regular rhythm. Heart sounds: No murmur heard. No friction rub. No gallop. Pulmonary:      Effort: Pulmonary effort is normal. No respiratory distress. Breath sounds: Normal breath sounds. No wheezing or rales. Chest:      Chest wall: No tenderness. Abdominal:      General: There is no distension. Musculoskeletal:         General: Normal range of motion.    Lymphadenopathy:      Cervical: No cervical adenopathy. Skin:     General: Skin is warm and dry. Findings: No rash. Neurological:      Mental Status: He is alert and oriented to person, place, and time. Cranial Nerves: No cranial nerve deficit. Psychiatric:         Behavior: Behavior normal.       Current Outpatient Medications   Medication Sig Dispense Refill    citalopram (CELEXA) 20 MG tablet TAKE 1 TABLET BY MOUTH ONCE DAILY      lisinopril (PRINIVIL;ZESTRIL) 20 MG tablet       CPAP Machine MISC by Does not apply route New CPAP with 5 cm . His CPAP is over 11 yrs old. Dx. MUNA 1 each 0    CPAP Machine MISC by Does not apply route New CPAP with 5 and and CPAP supply. 1 each 0    metoprolol succinate (TOPROL XL) 50 MG extended release tablet Take 1 tablet by mouth daily 90 tablet 3    amLODIPine (NORVASC) 5 MG tablet Take 1 tablet by mouth daily 90 tablet 3    rosuvastatin (CRESTOR) 5 MG tablet Take 1 tablet by mouth daily 90 tablet 3    Respiratory Therapy Supplies SHELIA New CPAP mask and supplies 1 Device 0     No current facility-administered medications for this visit. Assessment/Plan:     1. MUNA (obstructive sleep apnea)  He is using CPAP with  5 centimeters of H2O with heated humidity. He said he has new CPAP machine . He is using CPAP for about 9 hours every night. He is using CPAP with  Nasal  Mask. He said  sleep is restful with the CPAP use. He is compliant with CPAP therapy and benefiting with CPAP use. No snoring or  daytime sleepiness or tiredness with CPAP use. He denies taking naps. continue same . No need for CPAP supply . I reviewed compliance report with patient regarding CPAP therapy. He is using  CPAP for 30 days out of 30 days  Average usage of days used is 8 hours and 16 min , average AHI 0.7 with CPAP use. Counseling: CPAP/BiPAP uses, He advised to use CPAP at least 5-6 hours every night.     Driving: He is advised for extreme caution when driving or operating machinery if there is a feeling of drowsiness, especially while driving it is preferable to stop driving and take a brief nap. Sleep hygiene:Avoid supine sleep, sleep on  sides. Avoid  sleep deprivation. Explained sleep hygiene. Advice to avoid Alcohol and sedative    Time spend over 30 min. Face to face. with greater than 50 % time with CPAP therapy including review compliance, counseling and advised regarding CPAP therapy. 2. Overweight (BMI 25.0-29. 9)  He is advised try to lose weight. obesity related risk explained to the patient ,  Current weight:  213 lb (96.6 kg) Lbs. BMI:  Body mass index is 28.89 kg/m². Suggested weight control approaches, including dietary changes , exercise, behavioral modification. Return in about 6 months (around 9/7/2023) for corrine.       Elvis Higgins MD

## 2023-04-24 ENCOUNTER — OFFICE VISIT (OUTPATIENT)
Dept: PRIMARY CARE | Facility: CLINIC | Age: 73
End: 2023-04-24
Payer: COMMERCIAL

## 2023-04-24 VITALS
HEART RATE: 83 BPM | OXYGEN SATURATION: 97 % | SYSTOLIC BLOOD PRESSURE: 122 MMHG | TEMPERATURE: 98.1 F | HEIGHT: 72 IN | DIASTOLIC BLOOD PRESSURE: 82 MMHG | BODY MASS INDEX: 28.71 KG/M2 | WEIGHT: 212 LBS

## 2023-04-24 DIAGNOSIS — M15.9 PRIMARY OSTEOARTHRITIS INVOLVING MULTIPLE JOINTS: ICD-10-CM

## 2023-04-24 DIAGNOSIS — E78.2 MIXED HYPERLIPIDEMIA: ICD-10-CM

## 2023-04-24 DIAGNOSIS — N52.03 COMBINED ARTERIAL INSUFFICIENCY AND CORPORO-VENOUS OCCLUSIVE ERECTILE DYSFUNCTION: ICD-10-CM

## 2023-04-24 DIAGNOSIS — M16.12 PRIMARY OSTEOARTHRITIS OF LEFT HIP: ICD-10-CM

## 2023-04-24 DIAGNOSIS — J30.1 SEASONAL ALLERGIC RHINITIS DUE TO POLLEN: ICD-10-CM

## 2023-04-24 DIAGNOSIS — I10 ESSENTIAL HYPERTENSION: Primary | ICD-10-CM

## 2023-04-24 PROBLEM — G47.33 OSA (OBSTRUCTIVE SLEEP APNEA): Status: ACTIVE | Noted: 2017-07-24

## 2023-04-24 PROBLEM — H26.492 AFTER-CATARACT OBSCURING VISION, LEFT: Status: RESOLVED | Noted: 2019-07-16 | Resolved: 2023-04-24

## 2023-04-24 PROBLEM — K63.89 COLONIC MASS: Status: ACTIVE | Noted: 2022-04-19

## 2023-04-24 PROBLEM — Z96.1 S/P CATARACT EXTRACTION AND INSERTION OF INTRAOCULAR LENS, LEFT: Status: ACTIVE | Noted: 2019-07-16

## 2023-04-24 PROBLEM — B35.1 ONYCHOMYCOSIS: Status: ACTIVE | Noted: 2017-07-24

## 2023-04-24 PROBLEM — K59.00 CONSTIPATION: Chronic | Status: RESOLVED | Noted: 2018-11-01 | Resolved: 2023-04-24

## 2023-04-24 PROBLEM — K63.89 CECUM MASS: Status: RESOLVED | Noted: 2022-03-18 | Resolved: 2023-04-24

## 2023-04-24 PROBLEM — Z96.642 STATUS POST TOTAL HIP REPLACEMENT, LEFT: Status: RESOLVED | Noted: 2018-08-03 | Resolved: 2023-04-24

## 2023-04-24 PROBLEM — M79.672 PAIN IN BOTH FEET: Status: ACTIVE | Noted: 2017-07-24

## 2023-04-24 PROBLEM — M79.671 PAIN IN BOTH FEET: Status: ACTIVE | Noted: 2017-07-24

## 2023-04-24 PROBLEM — D12.6 ADENOMATOUS POLYP OF COLON: Status: RESOLVED | Noted: 2022-03-10 | Resolved: 2023-04-24

## 2023-04-24 PROBLEM — Z98.42 S/P CATARACT EXTRACTION AND INSERTION OF INTRAOCULAR LENS, LEFT: Status: ACTIVE | Noted: 2019-07-16

## 2023-04-24 PROCEDURE — 1036F TOBACCO NON-USER: CPT | Performed by: FAMILY MEDICINE

## 2023-04-24 PROCEDURE — 3074F SYST BP LT 130 MM HG: CPT | Performed by: FAMILY MEDICINE

## 2023-04-24 PROCEDURE — 99214 OFFICE O/P EST MOD 30 MIN: CPT | Performed by: FAMILY MEDICINE

## 2023-04-24 PROCEDURE — 1159F MED LIST DOCD IN RCRD: CPT | Performed by: FAMILY MEDICINE

## 2023-04-24 PROCEDURE — 1160F RVW MEDS BY RX/DR IN RCRD: CPT | Performed by: FAMILY MEDICINE

## 2023-04-24 PROCEDURE — 3079F DIAST BP 80-89 MM HG: CPT | Performed by: FAMILY MEDICINE

## 2023-04-24 RX ORDER — METOPROLOL SUCCINATE 50 MG/1
50 TABLET, EXTENDED RELEASE ORAL DAILY
Qty: 90 TABLET | Refills: 3 | Status: SHIPPED | OUTPATIENT
Start: 2023-04-24 | End: 2024-05-06

## 2023-04-24 RX ORDER — AMLODIPINE BESYLATE 5 MG/1
5 TABLET ORAL DAILY
Qty: 90 TABLET | Refills: 3 | Status: SHIPPED | OUTPATIENT
Start: 2023-04-24 | End: 2023-06-20 | Stop reason: SDUPTHER

## 2023-04-24 ASSESSMENT — ENCOUNTER SYMPTOMS
NUMBNESS: 0
DIZZINESS: 0
FATIGUE: 0
OCCASIONAL FEELINGS OF UNSTEADINESS: 0
DIARRHEA: 0
DIFFICULTY URINATING: 0
MYALGIAS: 0
SHORTNESS OF BREATH: 0
CHEST TIGHTNESS: 0
HEMATURIA: 0
DEPRESSION: 0
ARTHRALGIAS: 0
BACK PAIN: 0
DYSPHORIC MOOD: 0
ABDOMINAL PAIN: 0
NERVOUS/ANXIOUS: 0
BRUISES/BLEEDS EASILY: 0
NAUSEA: 0
WEAKNESS: 0
HEADACHES: 0
BLOOD IN STOOL: 0
VOMITING: 0
LOSS OF SENSATION IN FEET: 0
ADENOPATHY: 0
ACTIVITY CHANGE: 0
NECK PAIN: 0
EYE DISCHARGE: 0
SORE THROAT: 0
CONSTIPATION: 0
COUGH: 0

## 2023-04-24 ASSESSMENT — PATIENT HEALTH QUESTIONNAIRE - PHQ9
SUM OF ALL RESPONSES TO PHQ9 QUESTIONS 1 AND 2: 0
2. FEELING DOWN, DEPRESSED OR HOPELESS: NOT AT ALL
1. LITTLE INTEREST OR PLEASURE IN DOING THINGS: NOT AT ALL

## 2023-04-24 NOTE — PROGRESS NOTES
Subjective   Patient ID: Caleb Ledesma is a 72 y.o. male who presents for 3 month follow up on Hypertension.    Discuss possible bursitis in left hip, present for about 3 months. Complains of pain with pressure and when getting up from sitting. Tried heat/cold without relief.       HPI  Right hip pain  No trauma or injury  Over-the-counter medicines not that helpful  Discussed further testing or evaluation he declines at this time    Hypertension stable no chest pain or shortness of breath    Erectile dysfunction stable declines more testing or evaluation    Osteoarthritis multiple joints  No worsening or progression of symptoms    Seasonal allergies stable worse this time of year he    High cholesterol stable watch diet  Follow blood work and continue proper medicine treatment  Review of Systems   Constitutional:  Negative for activity change and fatigue.   HENT:  Negative for congestion and sore throat.    Eyes:  Negative for discharge.   Respiratory:  Negative for cough, chest tightness and shortness of breath.    Cardiovascular:  Negative for chest pain and leg swelling.   Gastrointestinal:  Negative for abdominal pain, blood in stool, constipation, diarrhea, nausea and vomiting.   Endocrine: Negative for cold intolerance and heat intolerance.   Genitourinary:  Negative for difficulty urinating and hematuria.   Musculoskeletal:  Negative for arthralgias, back pain, gait problem, myalgias and neck pain.   Allergic/Immunologic: Negative for environmental allergies.   Neurological:  Negative for dizziness, syncope, weakness, numbness and headaches.   Hematological:  Negative for adenopathy. Does not bruise/bleed easily.   Psychiatric/Behavioral:  Negative for dysphoric mood. The patient is not nervous/anxious.    All other systems reviewed and are negative.      Objective   /82 (BP Location: Left arm, BP Cuff Size: Large adult)   Pulse 83   Temp 36.7 °C (98.1 °F)   Ht 1.829 m (6')   Wt 96.2 kg (212 lb)    SpO2 97%   BMI 28.75 kg/m²    Physical Exam  Vitals and nursing note reviewed.   Constitutional:       General: He is not in acute distress.     Appearance: Normal appearance.   HENT:      Head: Normocephalic and atraumatic.      Right Ear: Tympanic membrane, ear canal and external ear normal.      Left Ear: Tympanic membrane, ear canal and external ear normal.      Nose: Nose normal.      Mouth/Throat:      Mouth: Mucous membranes are moist.      Pharynx: Oropharynx is clear. No oropharyngeal exudate or posterior oropharyngeal erythema.   Eyes:      Extraocular Movements: Extraocular movements intact.      Conjunctiva/sclera: Conjunctivae normal.      Pupils: Pupils are equal, round, and reactive to light.   Cardiovascular:      Rate and Rhythm: Normal rate and regular rhythm.      Pulses: Normal pulses.      Heart sounds: Normal heart sounds. No murmur heard.  Pulmonary:      Effort: Pulmonary effort is normal. No respiratory distress.      Breath sounds: Normal breath sounds. No wheezing or rales.   Abdominal:      General: Abdomen is flat. Bowel sounds are normal. There is no distension.      Palpations: Abdomen is soft. There is no mass.      Tenderness: There is no abdominal tenderness.   Musculoskeletal:         General: No swelling or deformity. Normal range of motion.      Cervical back: Normal range of motion and neck supple.      Right lower leg: No edema.      Left lower leg: No edema.   Lymphadenopathy:      Cervical: No cervical adenopathy.   Skin:     General: Skin is warm and dry.      Capillary Refill: Capillary refill takes less than 2 seconds.      Findings: No lesion or rash.   Neurological:      General: No focal deficit present.      Mental Status: He is alert and oriented to person, place, and time.      Cranial Nerves: No cranial nerve deficit.      Motor: No weakness.   Psychiatric:         Mood and Affect: Mood normal.         Behavior: Behavior normal.         Thought Content: Thought  content normal.         Judgment: Judgment normal.         Assessment/Plan   Problem List Items Addressed This Visit          Circulatory    Essential hypertension - Primary    Relevant Medications    metoprolol succinate XL (Toprol-XL) 50 mg 24 hr tablet    amLODIPine (Norvasc) 5 mg tablet    Other Relevant Orders    Follow Up In [facility] - PCP       Genitourinary    Combined arterial insufficiency and corporo-venous occlusive erectile dysfunction       Musculoskeletal    Primary osteoarthritis involving multiple joints    Primary osteoarthritis of left hip       Other    Mixed hyperlipidemia    Seasonal allergic rhinitis due to pollen       Patient education provided.  Stay current with age appropriate health maintenance as instructed.  Appointment here or ER with new or worsening symptoms'  Keep appropriate follow-up visit.  Stay current with proper immunizations   4 months

## 2023-06-20 DIAGNOSIS — I10 ESSENTIAL HYPERTENSION: ICD-10-CM

## 2023-06-20 RX ORDER — AMLODIPINE BESYLATE 5 MG/1
5 TABLET ORAL DAILY
Qty: 90 TABLET | Refills: 0 | Status: SHIPPED | OUTPATIENT
Start: 2023-06-20 | End: 2023-08-22 | Stop reason: SDUPTHER

## 2023-06-20 NOTE — TELEPHONE ENCOUNTER
Rx Refill Request Telephone Encounter    Name:  Caleb Ledesma  :  [id number]  Medication Name:  amlodipine (Norvasc) 5 mg   Specific Pharmacy location:  [facility]  Date of last appointment:    Date of next appointment:    Best number to reach patient:  [phone]

## 2023-08-22 ENCOUNTER — OFFICE VISIT (OUTPATIENT)
Dept: PRIMARY CARE | Facility: CLINIC | Age: 73
End: 2023-08-22
Payer: COMMERCIAL

## 2023-08-22 ENCOUNTER — LAB (OUTPATIENT)
Dept: LAB | Facility: LAB | Age: 73
End: 2023-08-22
Payer: COMMERCIAL

## 2023-08-22 VITALS
TEMPERATURE: 97.9 F | WEIGHT: 216.8 LBS | DIASTOLIC BLOOD PRESSURE: 80 MMHG | HEIGHT: 72 IN | OXYGEN SATURATION: 95 % | BODY MASS INDEX: 29.36 KG/M2 | SYSTOLIC BLOOD PRESSURE: 160 MMHG | HEART RATE: 80 BPM

## 2023-08-22 DIAGNOSIS — E78.2 MIXED HYPERLIPIDEMIA: ICD-10-CM

## 2023-08-22 DIAGNOSIS — I70.203 ATHEROSCLEROSIS OF NATIVE ARTERY OF BOTH LOWER EXTREMITIES, WITH UNSPECIFIED PRESENCE OF CLINICAL MANIFESTATION (CMS-HCC): ICD-10-CM

## 2023-08-22 DIAGNOSIS — C18.0 MALIGNANT NEOPLASM OF CECUM (MULTI): Primary | ICD-10-CM

## 2023-08-22 DIAGNOSIS — Z12.5 PROSTATE CANCER SCREENING: ICD-10-CM

## 2023-08-22 DIAGNOSIS — J30.1 SEASONAL ALLERGIC RHINITIS DUE TO POLLEN: ICD-10-CM

## 2023-08-22 DIAGNOSIS — N52.03 COMBINED ARTERIAL INSUFFICIENCY AND CORPORO-VENOUS OCCLUSIVE ERECTILE DYSFUNCTION: ICD-10-CM

## 2023-08-22 DIAGNOSIS — M15.9 PRIMARY OSTEOARTHRITIS INVOLVING MULTIPLE JOINTS: ICD-10-CM

## 2023-08-22 DIAGNOSIS — I10 ESSENTIAL HYPERTENSION: ICD-10-CM

## 2023-08-22 LAB
ALANINE AMINOTRANSFERASE (SGPT) (U/L) IN SER/PLAS: 20 U/L (ref 10–52)
ALBUMIN (G/DL) IN SER/PLAS: 4.2 G/DL (ref 3.4–5)
ALKALINE PHOSPHATASE (U/L) IN SER/PLAS: 49 U/L (ref 33–136)
ANION GAP IN SER/PLAS: 14 MMOL/L (ref 10–20)
ASPARTATE AMINOTRANSFERASE (SGOT) (U/L) IN SER/PLAS: 25 U/L (ref 9–39)
BASOPHILS (10*3/UL) IN BLOOD BY AUTOMATED COUNT: 0.02 X10E9/L (ref 0–0.1)
BASOPHILS/100 LEUKOCYTES IN BLOOD BY AUTOMATED COUNT: 0.3 % (ref 0–2)
BILIRUBIN TOTAL (MG/DL) IN SER/PLAS: 0.7 MG/DL (ref 0–1.2)
CALCIUM (MG/DL) IN SER/PLAS: 9.8 MG/DL (ref 8.6–10.3)
CARBON DIOXIDE, TOTAL (MMOL/L) IN SER/PLAS: 27 MMOL/L (ref 21–32)
CHLORIDE (MMOL/L) IN SER/PLAS: 101 MMOL/L (ref 98–107)
CHOLESTEROL (MG/DL) IN SER/PLAS: 149 MG/DL (ref 0–199)
CHOLESTEROL IN HDL (MG/DL) IN SER/PLAS: 55.4 MG/DL
CHOLESTEROL/HDL RATIO: 2.7
CREATININE (MG/DL) IN SER/PLAS: 0.84 MG/DL (ref 0.5–1.3)
EOSINOPHILS (10*3/UL) IN BLOOD BY AUTOMATED COUNT: 0.04 X10E9/L (ref 0–0.4)
EOSINOPHILS/100 LEUKOCYTES IN BLOOD BY AUTOMATED COUNT: 0.6 % (ref 0–6)
ERYTHROCYTE DISTRIBUTION WIDTH (RATIO) BY AUTOMATED COUNT: 13 % (ref 11.5–14.5)
ERYTHROCYTE MEAN CORPUSCULAR HEMOGLOBIN CONCENTRATION (G/DL) BY AUTOMATED: 32.6 G/DL (ref 32–36)
ERYTHROCYTE MEAN CORPUSCULAR VOLUME (FL) BY AUTOMATED COUNT: 98 FL (ref 80–100)
ERYTHROCYTES (10*6/UL) IN BLOOD BY AUTOMATED COUNT: 4.17 X10E12/L (ref 4.5–5.9)
GFR MALE: >90 ML/MIN/1.73M2
GLUCOSE (MG/DL) IN SER/PLAS: 101 MG/DL (ref 74–99)
HEMATOCRIT (%) IN BLOOD BY AUTOMATED COUNT: 40.8 % (ref 41–52)
HEMOGLOBIN (G/DL) IN BLOOD: 13.3 G/DL (ref 13.5–17.5)
IMMATURE GRANULOCYTES/100 LEUKOCYTES IN BLOOD BY AUTOMATED COUNT: 0.5 % (ref 0–0.9)
LDL: 57 MG/DL (ref 0–99)
LEUKOCYTES (10*3/UL) IN BLOOD BY AUTOMATED COUNT: 6.4 X10E9/L (ref 4.4–11.3)
LYMPHOCYTES (10*3/UL) IN BLOOD BY AUTOMATED COUNT: 1.36 X10E9/L (ref 0.8–3)
LYMPHOCYTES/100 LEUKOCYTES IN BLOOD BY AUTOMATED COUNT: 21.4 % (ref 13–44)
MONOCYTES (10*3/UL) IN BLOOD BY AUTOMATED COUNT: 0.56 X10E9/L (ref 0.05–0.8)
MONOCYTES/100 LEUKOCYTES IN BLOOD BY AUTOMATED COUNT: 8.8 % (ref 2–10)
NEUTROPHILS (10*3/UL) IN BLOOD BY AUTOMATED COUNT: 4.34 X10E9/L (ref 1.6–5.5)
NEUTROPHILS/100 LEUKOCYTES IN BLOOD BY AUTOMATED COUNT: 68.4 % (ref 40–80)
PLATELETS (10*3/UL) IN BLOOD AUTOMATED COUNT: 147 X10E9/L (ref 150–450)
POTASSIUM (MMOL/L) IN SER/PLAS: 4.5 MMOL/L (ref 3.5–5.3)
PROSTATE SPECIFIC AG (NG/ML) IN SER/PLAS: 0.3 NG/ML (ref 0–4)
PROTEIN TOTAL: 7 G/DL (ref 6.4–8.2)
SODIUM (MMOL/L) IN SER/PLAS: 137 MMOL/L (ref 136–145)
TRIGLYCERIDE (MG/DL) IN SER/PLAS: 184 MG/DL (ref 0–149)
UREA NITROGEN (MG/DL) IN SER/PLAS: 14 MG/DL (ref 6–23)
VLDL: 37 MG/DL (ref 0–40)

## 2023-08-22 PROCEDURE — 80053 COMPREHEN METABOLIC PANEL: CPT

## 2023-08-22 PROCEDURE — 80061 LIPID PANEL: CPT

## 2023-08-22 PROCEDURE — 1159F MED LIST DOCD IN RCRD: CPT | Performed by: FAMILY MEDICINE

## 2023-08-22 PROCEDURE — 36415 COLL VENOUS BLD VENIPUNCTURE: CPT

## 2023-08-22 PROCEDURE — 1160F RVW MEDS BY RX/DR IN RCRD: CPT | Performed by: FAMILY MEDICINE

## 2023-08-22 PROCEDURE — 3079F DIAST BP 80-89 MM HG: CPT | Performed by: FAMILY MEDICINE

## 2023-08-22 PROCEDURE — G0103 PSA SCREENING: HCPCS

## 2023-08-22 PROCEDURE — 1036F TOBACCO NON-USER: CPT | Performed by: FAMILY MEDICINE

## 2023-08-22 PROCEDURE — 85025 COMPLETE CBC W/AUTO DIFF WBC: CPT

## 2023-08-22 PROCEDURE — 99214 OFFICE O/P EST MOD 30 MIN: CPT | Performed by: FAMILY MEDICINE

## 2023-08-22 PROCEDURE — 3077F SYST BP >= 140 MM HG: CPT | Performed by: FAMILY MEDICINE

## 2023-08-22 RX ORDER — AMLODIPINE BESYLATE 5 MG/1
5 TABLET ORAL DAILY
Qty: 90 TABLET | Refills: 0 | Status: SHIPPED | OUTPATIENT
Start: 2023-08-22 | End: 2023-12-12 | Stop reason: SDUPTHER

## 2023-08-22 ASSESSMENT — ENCOUNTER SYMPTOMS
SHORTNESS OF BREATH: 0
NECK PAIN: 0
VOMITING: 0
FATIGUE: 0
HEADACHES: 0
ARTHRALGIAS: 0
NERVOUS/ANXIOUS: 0
CHEST TIGHTNESS: 0
MYALGIAS: 0
NUMBNESS: 0
BLOOD IN STOOL: 0
EYE DISCHARGE: 0
DIZZINESS: 0
DYSPHORIC MOOD: 0
ACTIVITY CHANGE: 0
BRUISES/BLEEDS EASILY: 0
CONSTIPATION: 0
ADENOPATHY: 0
DIFFICULTY URINATING: 0
WEAKNESS: 0
HEMATURIA: 0
COUGH: 0
BACK PAIN: 0
DIARRHEA: 0
NAUSEA: 0
SORE THROAT: 0
ABDOMINAL PAIN: 0

## 2023-08-22 NOTE — PROGRESS NOTES
Subjective   Patient ID: Caleb Ledesma is a 73 y.o. male who presents for 3 month follow up on Hypertension and Hyperlipidemia.      HPI  Hypertension stable  No chest pain or shortness of breath  Colon cancer stable keep follow-up with screening  Osteoarthritis stable  Multiple joints affected  ED stable no progression or worsening    Seasonal allergies stable  Ongoing this time of year    High cholesterol stable  Watch diet    Peripheral vascular disease stable keep follow-up with specialists    Colon cancer stable keep follow-up with specialists    Patient due for blood work and prostate cancer screening  Review of Systems   Constitutional:  Negative for activity change and fatigue.   HENT:  Negative for congestion and sore throat.    Eyes:  Negative for discharge.   Respiratory:  Negative for cough, chest tightness and shortness of breath.    Cardiovascular:  Negative for chest pain and leg swelling.   Gastrointestinal:  Negative for abdominal pain, blood in stool, constipation, diarrhea, nausea and vomiting.   Endocrine: Negative for cold intolerance and heat intolerance.   Genitourinary:  Negative for difficulty urinating and hematuria.   Musculoskeletal:  Negative for arthralgias, back pain, gait problem, myalgias and neck pain.   Allergic/Immunologic: Negative for environmental allergies.   Neurological:  Negative for dizziness, syncope, weakness, numbness and headaches.   Hematological:  Negative for adenopathy. Does not bruise/bleed easily.   Psychiatric/Behavioral:  Negative for dysphoric mood. The patient is not nervous/anxious.    All other systems reviewed and are negative.      Objective   /80 (BP Location: Right arm, BP Cuff Size: Large adult)   Pulse 80   Temp 36.6 °C (97.9 °F)   Ht 1.829 m (6')   Wt 98.3 kg (216 lb 12.8 oz)   SpO2 95%   BMI 29.40 kg/m²    Physical Exam  Vitals and nursing note reviewed.   Constitutional:       General: He is not in acute distress.     Appearance:  Normal appearance.   HENT:      Head: Normocephalic and atraumatic.      Right Ear: Tympanic membrane, ear canal and external ear normal.      Left Ear: Tympanic membrane, ear canal and external ear normal.      Nose: Nose normal.      Mouth/Throat:      Mouth: Mucous membranes are moist.      Pharynx: Oropharynx is clear. No oropharyngeal exudate or posterior oropharyngeal erythema.   Eyes:      Extraocular Movements: Extraocular movements intact.      Conjunctiva/sclera: Conjunctivae normal.      Pupils: Pupils are equal, round, and reactive to light.   Cardiovascular:      Rate and Rhythm: Normal rate and regular rhythm.      Pulses: Normal pulses.      Heart sounds: Normal heart sounds. No murmur heard.  Pulmonary:      Effort: Pulmonary effort is normal. No respiratory distress.      Breath sounds: Normal breath sounds. No wheezing or rales.   Abdominal:      General: Abdomen is flat. Bowel sounds are normal. There is no distension.      Palpations: Abdomen is soft. There is no mass.      Tenderness: There is no abdominal tenderness.   Musculoskeletal:         General: No swelling or deformity. Normal range of motion.      Cervical back: Normal range of motion and neck supple.      Right lower leg: No edema.      Left lower leg: No edema.   Lymphadenopathy:      Cervical: No cervical adenopathy.   Skin:     General: Skin is warm and dry.      Capillary Refill: Capillary refill takes less than 2 seconds.      Findings: No lesion or rash.   Neurological:      General: No focal deficit present.      Mental Status: He is alert and oriented to person, place, and time.      Cranial Nerves: No cranial nerve deficit.      Motor: No weakness.   Psychiatric:         Mood and Affect: Mood normal.         Behavior: Behavior normal.         Thought Content: Thought content normal.         Judgment: Judgment normal.         Assessment/Plan   Problem List Items Addressed This Visit       Primary osteoarthritis involving  multiple joints    Combined arterial insufficiency and corporo-venous occlusive erectile dysfunction    Essential hypertension    Relevant Medications    amLODIPine (Norvasc) 5 mg tablet    Other Relevant Orders    CBC and Auto Differential    Lipid Panel    Comprehensive Metabolic Panel    Mixed hyperlipidemia    Relevant Orders    Follow Up In [facility] - PCP    Seasonal allergic rhinitis due to pollen    Malignant neoplasm of cecum (CMS/HCC) - Primary    Atherosclerosis of native artery of both lower extremities, with unspecified presence of clinical manifestation (CMS/HCC)     Other Visit Diagnoses       Prostate cancer screening        Relevant Orders    Prostate Specific Antigen            Patient education provided.  Stay current with age appropriate health maintenance as instructed.  Appointment here or ER with new or worsening symptoms'  Keep appropriate follow-up visit.  Stay current with proper immunizations   Recheck 4 months and as needed stay current with specialist evaluations refills and blood work as above

## 2023-09-07 ENCOUNTER — OFFICE VISIT (OUTPATIENT)
Dept: PULMONOLOGY | Age: 73
End: 2023-09-07
Payer: MEDICARE

## 2023-09-07 VITALS
HEART RATE: 84 BPM | SYSTOLIC BLOOD PRESSURE: 164 MMHG | BODY MASS INDEX: 29.16 KG/M2 | DIASTOLIC BLOOD PRESSURE: 82 MMHG | WEIGHT: 215 LBS | OXYGEN SATURATION: 96 % | TEMPERATURE: 97.1 F

## 2023-09-07 DIAGNOSIS — E66.3 OVERWEIGHT (BMI 25.0-29.9): ICD-10-CM

## 2023-09-07 DIAGNOSIS — G47.33 OSA (OBSTRUCTIVE SLEEP APNEA): Primary | ICD-10-CM

## 2023-09-07 PROCEDURE — 3078F DIAST BP <80 MM HG: CPT | Performed by: INTERNAL MEDICINE

## 2023-09-07 PROCEDURE — 3074F SYST BP LT 130 MM HG: CPT | Performed by: INTERNAL MEDICINE

## 2023-09-07 PROCEDURE — 99214 OFFICE O/P EST MOD 30 MIN: CPT | Performed by: INTERNAL MEDICINE

## 2023-09-07 PROCEDURE — 1123F ACP DISCUSS/DSCN MKR DOCD: CPT | Performed by: INTERNAL MEDICINE

## 2023-09-07 ASSESSMENT — ENCOUNTER SYMPTOMS
VOMITING: 0
DIARRHEA: 0
SORE THROAT: 0
CHEST TIGHTNESS: 0
NAUSEA: 0
WHEEZING: 0
ABDOMINAL PAIN: 0
VOICE CHANGE: 0
EYE ITCHING: 0
COUGH: 0
SHORTNESS OF BREATH: 0
RHINORRHEA: 0

## 2023-12-12 DIAGNOSIS — F32.A DEPRESSION, UNSPECIFIED DEPRESSION TYPE: Primary | ICD-10-CM

## 2023-12-12 DIAGNOSIS — I10 ESSENTIAL HYPERTENSION: ICD-10-CM

## 2023-12-12 RX ORDER — AMLODIPINE BESYLATE 5 MG/1
5 TABLET ORAL DAILY
Qty: 90 TABLET | Refills: 0 | Status: SHIPPED | OUTPATIENT
Start: 2023-12-12 | End: 2024-03-22 | Stop reason: SDUPTHER

## 2023-12-12 RX ORDER — CITALOPRAM 20 MG/1
20 TABLET, FILM COATED ORAL DAILY
Qty: 90 TABLET | Refills: 0 | Status: SHIPPED | OUTPATIENT
Start: 2023-12-12 | End: 2024-02-16 | Stop reason: SDUPTHER

## 2023-12-12 NOTE — TELEPHONE ENCOUNTER
Rx Refill Request Telephone Encounter    Name:  Caleb Ledesma  :  [id number]  Medication Name:  AMLODIPINE 5MG, CITALOPRAM 20MG  Specific Pharmacy location:  [facility]    Date of last appointment: 23   Date of next appointment:  23  Best number to reach patient:  [phone]

## 2023-12-20 NOTE — PROGRESS NOTES
Subjective   Patient ID: Caleb Ledesma is a 73 y.o. male who presents for Hypertension and Hyperlipidemia.  HPI  High cholesterol stable  Watch diet follow blood work    Claudication stable no progression or worsening keep follow-up with vascular specialist    Hypertension stable no chest pain or shortness of breath    Seasonal allergies stable no progression or worsening    Erectile dysfunction stable medicine helpful    Status post colon cancer keep follow-up with specialist    Osteoarthritis stable  Multiple joints affected  Review of Systems   Constitutional:  Negative for activity change and fatigue.   HENT:  Negative for congestion and sore throat.    Eyes:  Negative for discharge.   Respiratory:  Negative for cough, chest tightness and shortness of breath.    Cardiovascular:  Negative for chest pain and leg swelling.   Gastrointestinal:  Negative for abdominal pain, blood in stool, constipation, diarrhea, nausea and vomiting.   Endocrine: Negative for cold intolerance and heat intolerance.   Genitourinary:  Negative for difficulty urinating and hematuria.   Musculoskeletal:  Negative for arthralgias, back pain, gait problem, myalgias and neck pain.   Allergic/Immunologic: Negative for environmental allergies.   Neurological:  Negative for dizziness, syncope, weakness, numbness and headaches.   Hematological:  Negative for adenopathy. Does not bruise/bleed easily.   Psychiatric/Behavioral:  Negative for dysphoric mood. The patient is not nervous/anxious.    All other systems reviewed and are negative.      Objective   /80 (BP Location: Right arm, BP Cuff Size: Adult)   Pulse 82   Temp 36.8 °C (98.2 °F) (Temporal)   Ht 1.829 m (6')   Wt 99.2 kg (218 lb 9.6 oz)   SpO2 98%   BMI 29.65 kg/m²    Physical Exam  Vitals and nursing note reviewed.   Constitutional:       General: He is not in acute distress.     Appearance: Normal appearance.   HENT:      Head: Normocephalic and atraumatic.      Right Ear:  Tympanic membrane, ear canal and external ear normal.      Left Ear: Tympanic membrane, ear canal and external ear normal.      Nose: Nose normal.      Mouth/Throat:      Mouth: Mucous membranes are moist.      Pharynx: Oropharynx is clear. No oropharyngeal exudate or posterior oropharyngeal erythema.   Eyes:      Extraocular Movements: Extraocular movements intact.      Conjunctiva/sclera: Conjunctivae normal.      Pupils: Pupils are equal, round, and reactive to light.   Cardiovascular:      Rate and Rhythm: Normal rate and regular rhythm.      Pulses: Normal pulses.      Heart sounds: Normal heart sounds. No murmur heard.  Pulmonary:      Effort: Pulmonary effort is normal. No respiratory distress.      Breath sounds: Normal breath sounds. No wheezing or rales.   Abdominal:      General: Abdomen is flat. Bowel sounds are normal. There is no distension.      Palpations: Abdomen is soft. There is no mass.      Tenderness: There is no abdominal tenderness.   Musculoskeletal:         General: No swelling or deformity. Normal range of motion.      Cervical back: Normal range of motion and neck supple.      Right lower leg: No edema.      Left lower leg: No edema.   Lymphadenopathy:      Cervical: No cervical adenopathy.   Skin:     General: Skin is warm and dry.      Capillary Refill: Capillary refill takes less than 2 seconds.      Findings: No lesion or rash.   Neurological:      General: No focal deficit present.      Mental Status: He is alert and oriented to person, place, and time.      Cranial Nerves: No cranial nerve deficit.      Motor: No weakness.   Psychiatric:         Mood and Affect: Mood normal.         Behavior: Behavior normal.         Thought Content: Thought content normal.         Judgment: Judgment normal.         Assessment/Plan   Problem List Items Addressed This Visit       Primary osteoarthritis involving multiple joints    Combined arterial insufficiency and corporo-venous occlusive erectile  dysfunction    Essential hypertension    Mixed hyperlipidemia - Primary    Relevant Orders    Follow Up In [facility] - PCP    Seasonal allergic rhinitis due to pollen    Malignant neoplasm of cecum (CMS/HCC)    Atherosclerosis of native artery of both lower extremities, with unspecified presence of clinical manifestation (CMS/HCC)       Patient education provided.  Stay current with age appropriate health maintenance as instructed.  Appointment here or ER with new or worsening symptoms'  Keep appropriate follow-up visit.  Stay current with proper immunizations   Recheck 3 months  Sooner with new or worsening symptoms  Discussed at length with patient

## 2023-12-21 ENCOUNTER — OFFICE VISIT (OUTPATIENT)
Dept: PRIMARY CARE | Facility: CLINIC | Age: 73
End: 2023-12-21
Payer: COMMERCIAL

## 2023-12-21 VITALS
OXYGEN SATURATION: 98 % | SYSTOLIC BLOOD PRESSURE: 134 MMHG | DIASTOLIC BLOOD PRESSURE: 80 MMHG | TEMPERATURE: 98.2 F | WEIGHT: 218.6 LBS | HEIGHT: 72 IN | HEART RATE: 82 BPM | BODY MASS INDEX: 29.61 KG/M2

## 2023-12-21 DIAGNOSIS — I70.203 ATHEROSCLEROSIS OF NATIVE ARTERY OF BOTH LOWER EXTREMITIES, WITH UNSPECIFIED PRESENCE OF CLINICAL MANIFESTATION (CMS-HCC): ICD-10-CM

## 2023-12-21 DIAGNOSIS — C18.0 MALIGNANT NEOPLASM OF CECUM (MULTI): ICD-10-CM

## 2023-12-21 DIAGNOSIS — E78.2 MIXED HYPERLIPIDEMIA: Primary | ICD-10-CM

## 2023-12-21 DIAGNOSIS — N52.03 COMBINED ARTERIAL INSUFFICIENCY AND CORPORO-VENOUS OCCLUSIVE ERECTILE DYSFUNCTION: ICD-10-CM

## 2023-12-21 DIAGNOSIS — I10 ESSENTIAL HYPERTENSION: ICD-10-CM

## 2023-12-21 DIAGNOSIS — M15.9 PRIMARY OSTEOARTHRITIS INVOLVING MULTIPLE JOINTS: ICD-10-CM

## 2023-12-21 DIAGNOSIS — J30.1 SEASONAL ALLERGIC RHINITIS DUE TO POLLEN: ICD-10-CM

## 2023-12-21 PROCEDURE — 99214 OFFICE O/P EST MOD 30 MIN: CPT | Performed by: FAMILY MEDICINE

## 2023-12-21 PROCEDURE — 3075F SYST BP GE 130 - 139MM HG: CPT | Performed by: FAMILY MEDICINE

## 2023-12-21 PROCEDURE — 1160F RVW MEDS BY RX/DR IN RCRD: CPT | Performed by: FAMILY MEDICINE

## 2023-12-21 PROCEDURE — 1159F MED LIST DOCD IN RCRD: CPT | Performed by: FAMILY MEDICINE

## 2023-12-21 PROCEDURE — 1036F TOBACCO NON-USER: CPT | Performed by: FAMILY MEDICINE

## 2023-12-21 PROCEDURE — 3079F DIAST BP 80-89 MM HG: CPT | Performed by: FAMILY MEDICINE

## 2023-12-21 ASSESSMENT — ENCOUNTER SYMPTOMS
CONSTIPATION: 0
DIARRHEA: 0
BRUISES/BLEEDS EASILY: 0
DYSPHORIC MOOD: 0
HEMATURIA: 0
WEAKNESS: 0
CHEST TIGHTNESS: 0
NAUSEA: 0
ADENOPATHY: 0
BLOOD IN STOOL: 0
ARTHRALGIAS: 0
ACTIVITY CHANGE: 0
VOMITING: 0
ABDOMINAL PAIN: 0
NECK PAIN: 0
SORE THROAT: 0
NUMBNESS: 0
DIZZINESS: 0
COUGH: 0
FATIGUE: 0
NERVOUS/ANXIOUS: 0
HEADACHES: 0
EYE DISCHARGE: 0
MYALGIAS: 0
BACK PAIN: 0
DIFFICULTY URINATING: 0
SHORTNESS OF BREATH: 0

## 2024-01-12 DIAGNOSIS — E78.2 MIXED HYPERLIPIDEMIA: ICD-10-CM

## 2024-01-12 RX ORDER — ROSUVASTATIN CALCIUM 5 MG/1
5 TABLET, COATED ORAL DAILY
Qty: 90 TABLET | Refills: 0 | Status: SHIPPED | OUTPATIENT
Start: 2024-01-12 | End: 2024-04-08

## 2024-01-12 NOTE — TELEPHONE ENCOUNTER
Rx Refill Request Telephone Encounter    Name:  Caleb Ledesma  :  [id number]  Medication Name:  ROSUVASTATIN 5 MG ; LAST FILL UNKNOWN  Specific Pharmacy location:  [facility]  Date of last appointment:    Date of next appointment:    Best number to reach patient:  [phone]    PLEASE ADVISE.

## 2024-02-16 DIAGNOSIS — F32.A DEPRESSION, UNSPECIFIED DEPRESSION TYPE: ICD-10-CM

## 2024-02-16 RX ORDER — CITALOPRAM 20 MG/1
20 TABLET, FILM COATED ORAL DAILY
Qty: 90 TABLET | Refills: 0 | Status: SHIPPED | OUTPATIENT
Start: 2024-02-16 | End: 2024-04-19 | Stop reason: SDUPTHER

## 2024-02-16 NOTE — TELEPHONE ENCOUNTER
Rx Refill Request Telephone Encounter    Name:  Caleb Ledesma  :  [id number]  Medication Name:  CITALOPRAM 20MG  Specific Pharmacy location: [facility]  Date of last appointment:  23  Date of next appointment: 23  Best number to reach patient:  [phone]

## 2024-03-07 ENCOUNTER — OFFICE VISIT (OUTPATIENT)
Dept: PULMONOLOGY | Age: 74
End: 2024-03-07
Payer: MEDICARE

## 2024-03-07 VITALS
OXYGEN SATURATION: 96 % | SYSTOLIC BLOOD PRESSURE: 146 MMHG | DIASTOLIC BLOOD PRESSURE: 70 MMHG | HEART RATE: 71 BPM | WEIGHT: 220 LBS | BODY MASS INDEX: 29.84 KG/M2

## 2024-03-07 DIAGNOSIS — E66.3 OVERWEIGHT (BMI 25.0-29.9): ICD-10-CM

## 2024-03-07 DIAGNOSIS — G47.33 OSA (OBSTRUCTIVE SLEEP APNEA): Primary | ICD-10-CM

## 2024-03-07 PROCEDURE — 3078F DIAST BP <80 MM HG: CPT | Performed by: INTERNAL MEDICINE

## 2024-03-07 PROCEDURE — 99214 OFFICE O/P EST MOD 30 MIN: CPT | Performed by: INTERNAL MEDICINE

## 2024-03-07 PROCEDURE — 3077F SYST BP >= 140 MM HG: CPT | Performed by: INTERNAL MEDICINE

## 2024-03-07 PROCEDURE — 1123F ACP DISCUSS/DSCN MKR DOCD: CPT | Performed by: INTERNAL MEDICINE

## 2024-03-07 NOTE — PROGRESS NOTES
dietary changes , exercise, behavioral modification.    Return in about 6 months (around 9/7/2024) for corrine.      Holger Hdz MD

## 2024-03-22 DIAGNOSIS — I10 ESSENTIAL HYPERTENSION: ICD-10-CM

## 2024-03-22 RX ORDER — AMLODIPINE BESYLATE 5 MG/1
5 TABLET ORAL DAILY
Qty: 90 TABLET | Refills: 2 | Status: SHIPPED | OUTPATIENT
Start: 2024-03-22

## 2024-03-22 NOTE — TELEPHONE ENCOUNTER
Rx Refill Request Telephone Encounter    Name:  Caleb Ledesma  :  [id number]  Medication Name:  amlodipine 5 mg  Specific Pharmacy location:  [facility]  Date of last appointment:    Date of next appointment:    Best number to reach patient:  [phone]    Please advise.

## 2024-03-27 NOTE — PROGRESS NOTES
Subjective   Reason for Visit: Caleb Ledesma is an 73 y.o. y.o. male here for a Medicare Wellness visit.        Muscle stiffness in both legs        HPI    Patient Care Team:  Wilson Montes MD as PCP - General  Wilson Montes MD as PCP - [facility] [facility] PCP     Review of Systems    Objective   Vitals:  /76 (BP Location: Left arm, BP Cuff Size: Large adult)   Pulse 70   Ht 1.829 m (6')   Wt 99.2 kg (218 lb 12.8 oz)   SpO2 97%   BMI 29.67 kg/m²       Physical Exam    Assessment/Plan   Problem List Items Addressed This Visit    None  Patient education provided.  Stay current with age appropriate health maintenance as instructed.  Appointment here or ER with new or worsening symptoms'  Keep appropriate follow-up visit.  Stay current with proper immunizations

## 2024-04-08 DIAGNOSIS — E78.2 MIXED HYPERLIPIDEMIA: ICD-10-CM

## 2024-04-08 RX ORDER — ROSUVASTATIN CALCIUM 5 MG/1
5 TABLET, COATED ORAL DAILY
Qty: 90 TABLET | Refills: 0 | Status: SHIPPED | OUTPATIENT
Start: 2024-04-08

## 2024-04-08 NOTE — TELEPHONE ENCOUNTER
Rx Refill Request     Name: Caleb Ledesma  :  1950   Medication Name:  rosuvastatin (Crestor) 5 mg tablet   Specific Pharmacy location:  [facility] [address] [place] [address]   Date of last appointment:  2023   Date of next appointment:  2024   Best number to reach patient:  [phone]

## 2024-04-19 ENCOUNTER — OFFICE VISIT (OUTPATIENT)
Dept: PRIMARY CARE | Facility: CLINIC | Age: 74
End: 2024-04-19
Payer: COMMERCIAL

## 2024-04-19 VITALS
DIASTOLIC BLOOD PRESSURE: 76 MMHG | HEIGHT: 72 IN | SYSTOLIC BLOOD PRESSURE: 134 MMHG | WEIGHT: 218.8 LBS | HEART RATE: 70 BPM | BODY MASS INDEX: 29.64 KG/M2 | OXYGEN SATURATION: 97 %

## 2024-04-19 DIAGNOSIS — E78.2 MIXED HYPERLIPIDEMIA: ICD-10-CM

## 2024-04-19 DIAGNOSIS — I70.203 ATHEROSCLEROSIS OF NATIVE ARTERY OF BOTH LOWER EXTREMITIES, WITH UNSPECIFIED PRESENCE OF CLINICAL MANIFESTATION (CMS-HCC): ICD-10-CM

## 2024-04-19 DIAGNOSIS — M25.552 HIP PAIN, BILATERAL: ICD-10-CM

## 2024-04-19 DIAGNOSIS — C18.0 MALIGNANT NEOPLASM OF CECUM (MULTI): ICD-10-CM

## 2024-04-19 DIAGNOSIS — F32.A DEPRESSION, UNSPECIFIED DEPRESSION TYPE: ICD-10-CM

## 2024-04-19 DIAGNOSIS — Z00.00 ROUTINE GENERAL MEDICAL EXAMINATION AT HEALTH CARE FACILITY: Primary | ICD-10-CM

## 2024-04-19 DIAGNOSIS — I10 ESSENTIAL HYPERTENSION: ICD-10-CM

## 2024-04-19 DIAGNOSIS — M25.551 HIP PAIN, BILATERAL: ICD-10-CM

## 2024-04-19 PROCEDURE — 1036F TOBACCO NON-USER: CPT | Performed by: FAMILY MEDICINE

## 2024-04-19 PROCEDURE — G0439 PPPS, SUBSEQ VISIT: HCPCS | Performed by: FAMILY MEDICINE

## 2024-04-19 PROCEDURE — 1170F FXNL STATUS ASSESSED: CPT | Performed by: FAMILY MEDICINE

## 2024-04-19 PROCEDURE — 3078F DIAST BP <80 MM HG: CPT | Performed by: FAMILY MEDICINE

## 2024-04-19 PROCEDURE — 1160F RVW MEDS BY RX/DR IN RCRD: CPT | Performed by: FAMILY MEDICINE

## 2024-04-19 PROCEDURE — 3075F SYST BP GE 130 - 139MM HG: CPT | Performed by: FAMILY MEDICINE

## 2024-04-19 PROCEDURE — 1159F MED LIST DOCD IN RCRD: CPT | Performed by: FAMILY MEDICINE

## 2024-04-19 PROCEDURE — 99214 OFFICE O/P EST MOD 30 MIN: CPT | Performed by: FAMILY MEDICINE

## 2024-04-19 RX ORDER — CITALOPRAM 20 MG/1
20 TABLET, FILM COATED ORAL DAILY
Qty: 90 TABLET | Refills: 3 | Status: SHIPPED | OUTPATIENT
Start: 2024-04-19

## 2024-04-19 RX ORDER — LISINOPRIL 20 MG/1
20 TABLET ORAL DAILY
Qty: 90 TABLET | Refills: 3 | Status: SHIPPED | OUTPATIENT
Start: 2024-04-19

## 2024-04-19 ASSESSMENT — ENCOUNTER SYMPTOMS
DIFFICULTY URINATING: 0
NERVOUS/ANXIOUS: 0
DYSPHORIC MOOD: 0
SORE THROAT: 0
DIARRHEA: 0
MYALGIAS: 0
FATIGUE: 0
SHORTNESS OF BREATH: 0
HEADACHES: 0
ACTIVITY CHANGE: 0
BACK PAIN: 0
BRUISES/BLEEDS EASILY: 0
ADENOPATHY: 0
CONSTIPATION: 0
NECK PAIN: 0
OCCASIONAL FEELINGS OF UNSTEADINESS: 0
CHEST TIGHTNESS: 0
VOMITING: 0
HEMATURIA: 0
DEPRESSION: 0
NUMBNESS: 0
ABDOMINAL PAIN: 0
ARTHRALGIAS: 0
BLOOD IN STOOL: 0
LOSS OF SENSATION IN FEET: 0
DIZZINESS: 0
EYE DISCHARGE: 0
WEAKNESS: 0
NAUSEA: 0
COUGH: 0

## 2024-04-19 ASSESSMENT — ACTIVITIES OF DAILY LIVING (ADL)
MANAGING_FINANCES: INDEPENDENT
TAKING_MEDICATION: INDEPENDENT
DOING_HOUSEWORK: INDEPENDENT
GROCERY_SHOPPING: INDEPENDENT
DRESSING: INDEPENDENT
BATHING: INDEPENDENT

## 2024-04-19 ASSESSMENT — PATIENT HEALTH QUESTIONNAIRE - PHQ9
2. FEELING DOWN, DEPRESSED OR HOPELESS: NOT AT ALL
SUM OF ALL RESPONSES TO PHQ9 QUESTIONS 1 AND 2: 0
1. LITTLE INTEREST OR PLEASURE IN DOING THINGS: NOT AT ALL

## 2024-04-19 NOTE — PROGRESS NOTES
Subjective   Reason for Visit: Caleb Ledesma is an 73 y.o. male here for a Medicare Wellness visit.     Past Medical, Surgical, and Family History reviewed and updated in chart.    Reviewed all medications by prescribing practitioner or clinical pharmacist (such as prescriptions, OTCs, herbal therapies and supplements) and documented in the medical record.    HPI  Patient here for annual health maintenance  Also general checkup    High cholesterol stable watch diet    Depressed mood stable  No suicidal ideation no psychotic or manic symptoms    Cancer of the cecum keep GI follow-up    Peripheral vascular disease stable keep vascular evaluation    Hypertension stable no chest pain or shortness of breath    Bilateral hip pain  Had hip replacements  Recommend orthopedic evaluation for reevaluation  Patient Care Team:  Wilson Montes MD as PCP - General  Wilson Montes MD as PCP - [employer] PCP     Review of Systems   Constitutional:  Negative for activity change and fatigue.   HENT:  Negative for congestion and sore throat.    Eyes:  Negative for discharge.   Respiratory:  Negative for cough, chest tightness and shortness of breath.    Cardiovascular:  Negative for chest pain and leg swelling.   Gastrointestinal:  Negative for abdominal pain, blood in stool, constipation, diarrhea, nausea and vomiting.   Endocrine: Negative for cold intolerance and heat intolerance.   Genitourinary:  Negative for difficulty urinating and hematuria.   Musculoskeletal:  Negative for arthralgias, back pain, gait problem, myalgias and neck pain.   Allergic/Immunologic: Negative for environmental allergies.   Neurological:  Negative for dizziness, syncope, weakness, numbness and headaches.   Hematological:  Negative for adenopathy. Does not bruise/bleed easily.   Psychiatric/Behavioral:  Negative for dysphoric mood. The patient is not nervous/anxious.    All other systems reviewed and are negative.      Objective    Vitals:  /76 (BP Location: Left arm, BP Cuff Size: Large adult)   Pulse 70   Ht 1.829 m (6')   Wt 99.2 kg (218 lb 12.8 oz)   SpO2 97%   BMI 29.67 kg/m²       Physical Exam  Vitals and nursing note reviewed.   Constitutional:       General: He is not in acute distress.     Appearance: Normal appearance.   HENT:      Head: Normocephalic and atraumatic.      Right Ear: Tympanic membrane, ear canal and external ear normal.      Left Ear: Tympanic membrane, ear canal and external ear normal.      Nose: Nose normal.      Mouth/Throat:      Mouth: Mucous membranes are moist.      Pharynx: Oropharynx is clear. No oropharyngeal exudate or posterior oropharyngeal erythema.   Eyes:      Extraocular Movements: Extraocular movements intact.      Conjunctiva/sclera: Conjunctivae normal.      Pupils: Pupils are equal, round, and reactive to light.   Cardiovascular:      Rate and Rhythm: Normal rate and regular rhythm.      Pulses: Normal pulses.      Heart sounds: Normal heart sounds. No murmur heard.  Pulmonary:      Effort: Pulmonary effort is normal. No respiratory distress.      Breath sounds: Normal breath sounds. No wheezing or rales.   Abdominal:      General: Abdomen is flat. Bowel sounds are normal. There is no distension.      Palpations: Abdomen is soft. There is no mass.      Tenderness: There is no abdominal tenderness.   Musculoskeletal:         General: No swelling or deformity. Normal range of motion.      Cervical back: Normal range of motion and neck supple.      Right lower leg: No edema.      Left lower leg: No edema.   Lymphadenopathy:      Cervical: No cervical adenopathy.   Skin:     General: Skin is warm and dry.      Capillary Refill: Capillary refill takes less than 2 seconds.      Findings: No lesion or rash.   Neurological:      General: No focal deficit present.      Mental Status: He is alert and oriented to person, place, and time.      Cranial Nerves: No cranial nerve deficit.       Motor: No weakness.   Psychiatric:         Mood and Affect: Mood normal.         Behavior: Behavior normal.         Thought Content: Thought content normal.         Judgment: Judgment normal.         Assessment/Plan   Problem List Items Addressed This Visit       Essential hypertension    Relevant Medications    lisinopril 20 mg tablet    Other Relevant Orders    Follow Up In [facility] - PCP    Mixed hyperlipidemia    Malignant neoplasm of cecum (Multi)    Atherosclerosis of native artery of both lower extremities, with unspecified presence of clinical manifestation (CMS-HCC)     Other Visit Diagnoses       Routine general medical examination at health care facility    -  Primary    Depression, unspecified depression type        Relevant Medications    citalopram (CeleXA) 20 mg tablet    Hip pain, bilateral              Patient education provided.  Stay current with age appropriate health maintenance as instructed.  Appointment here or ER with new or worsening symptoms'  Keep appropriate follow-up visit.  Stay current with proper immunizations  Recheck 6 months and as needed  Refill as above  Follow-up with Ortho specialist for the hip pain  Report suicidal ideation report psychotic or manic symptoms  Discussed at length with patient

## 2024-04-19 NOTE — LETTER
April 19, 2024     Patient: Caleb Ledesma   YOB: 1950   Date of Visit: 4/19/2024       To Whom It May Concern:    It is my medical opinion that Mr. Ledesma requires a disability parking placard for the following reasons:  He cannot walk 200 feet without stopping to rest.  Duration of need: 5 years  Expires 4/19/2029      If you have any questions or concerns, please don't hesitate to call.         Sincerely,        Wilson Montes MD

## 2024-05-01 ENCOUNTER — TELEPHONE (OUTPATIENT)
Dept: PRIMARY CARE | Facility: CLINIC | Age: 74
End: 2024-05-01

## 2024-05-01 DIAGNOSIS — F32.A DEPRESSION, UNSPECIFIED DEPRESSION TYPE: ICD-10-CM

## 2024-05-01 NOTE — LETTER
May 1, 2024     Patient: Caleb Ledesma   YOB: 1950   Date of Visit: 5/1/2024       To Whom It May Concern:    It is my medical opinion that Mr. Ledesma requires a disability parking placard for the following reasons:  He cannot walk without assistance from another person or the use of an assistance device (cane, crutch, prosthetic device, wheelchair, etc.).  He cannot walk 200 feet without stopping to rest.  Duration of need: 5 years expires on 5/1/2029    If you have any questions or concerns, please don't hesitate to call.         Sincerely,        Wilson Montes MD

## 2024-05-01 NOTE — TELEPHONE ENCOUNTER
Rx Refill Request Telephone Encounter    Name:  Caleb Ledesma  :  [id number]  Medication Name:    citalopram (CeleXA) 20 mg tablet     Handicap Placard - he asked if this letter could be mailed to him    Specific Pharmacy location:  [facility]   Date of last appointment:  24  Date of next appointment:  24  Best number to reach patient:  [phone]

## 2024-05-01 NOTE — TELEPHONE ENCOUNTER
I called and informed the pt that there are 3 refills on his celeXA. Also, if there are any issues with picking it up from the pharmacy to call back and let us know.

## 2024-05-06 DIAGNOSIS — I10 ESSENTIAL HYPERTENSION: ICD-10-CM

## 2024-05-06 RX ORDER — METOPROLOL SUCCINATE 50 MG/1
50 TABLET, EXTENDED RELEASE ORAL DAILY
Qty: 90 TABLET | Refills: 0 | Status: SHIPPED | OUTPATIENT
Start: 2024-05-06

## 2024-05-06 NOTE — TELEPHONE ENCOUNTER
Rx Refill Request     Name: Caleb Ledesma  :  1950   Medication Name:  metoprolol succinate XL (Toprol-XL) 50 mg 24 hr tablet   Specific Pharmacy location:  [address]   Date of last appointment:  2024   Date of next appointment:  2024   Best number to reach patient:  [phone]

## 2024-07-05 DIAGNOSIS — E78.2 MIXED HYPERLIPIDEMIA: ICD-10-CM

## 2024-07-05 RX ORDER — ROSUVASTATIN CALCIUM 5 MG/1
5 TABLET, COATED ORAL DAILY
Qty: 90 TABLET | Refills: 0 | Status: SHIPPED | OUTPATIENT
Start: 2024-07-05

## 2024-07-05 NOTE — TELEPHONE ENCOUNTER
Rx Refill Request Telephone Encounter    Name:  Caleb Ledesma  :  [id number]  Medication Name:  rosuvastatin (Crestor) 5 mg tablet       Specific Pharmacy location:  [facility] [place] [facility]  Date of last appointment:  24  Date of next appointment:  24  Best number to reach patient:  [phone]

## 2024-07-31 DIAGNOSIS — I10 ESSENTIAL HYPERTENSION: ICD-10-CM

## 2024-08-01 RX ORDER — METOPROLOL SUCCINATE 50 MG/1
50 TABLET, EXTENDED RELEASE ORAL DAILY
Qty: 90 TABLET | Refills: 0 | Status: SHIPPED | OUTPATIENT
Start: 2024-08-01

## 2024-08-17 ASSESSMENT — ENCOUNTER SYMPTOMS
BLURRED VISION: 1
NECK PAIN: 1
HYPERTENSION: 1

## 2024-08-19 ENCOUNTER — APPOINTMENT (OUTPATIENT)
Dept: PRIMARY CARE | Facility: CLINIC | Age: 74
End: 2024-08-19
Payer: COMMERCIAL

## 2024-08-19 ENCOUNTER — LAB (OUTPATIENT)
Dept: LAB | Facility: LAB | Age: 74
End: 2024-08-19
Payer: COMMERCIAL

## 2024-08-19 VITALS
WEIGHT: 221 LBS | BODY MASS INDEX: 29.93 KG/M2 | DIASTOLIC BLOOD PRESSURE: 78 MMHG | SYSTOLIC BLOOD PRESSURE: 138 MMHG | HEIGHT: 72 IN | HEART RATE: 73 BPM | OXYGEN SATURATION: 95 %

## 2024-08-19 DIAGNOSIS — I10 ESSENTIAL HYPERTENSION: ICD-10-CM

## 2024-08-19 DIAGNOSIS — E78.2 MIXED HYPERLIPIDEMIA: ICD-10-CM

## 2024-08-19 DIAGNOSIS — M25.561 CHRONIC PAIN OF RIGHT KNEE: ICD-10-CM

## 2024-08-19 DIAGNOSIS — I10 ESSENTIAL HYPERTENSION: Primary | ICD-10-CM

## 2024-08-19 DIAGNOSIS — Z12.5 PROSTATE CANCER SCREENING: ICD-10-CM

## 2024-08-19 DIAGNOSIS — G89.29 CHRONIC PAIN OF RIGHT KNEE: ICD-10-CM

## 2024-08-19 LAB
ALBUMIN SERPL BCP-MCNC: 4.5 G/DL (ref 3.4–5)
ALP SERPL-CCNC: 49 U/L (ref 33–136)
ALT SERPL W P-5'-P-CCNC: 24 U/L (ref 10–52)
ANION GAP SERPL CALC-SCNC: 14 MMOL/L (ref 10–20)
AST SERPL W P-5'-P-CCNC: 29 U/L (ref 9–39)
BASOPHILS # BLD AUTO: 0.02 X10*3/UL (ref 0–0.1)
BASOPHILS NFR BLD AUTO: 0.4 %
BILIRUB SERPL-MCNC: 0.6 MG/DL (ref 0–1.2)
BUN SERPL-MCNC: 17 MG/DL (ref 6–23)
CALCIUM SERPL-MCNC: 9.4 MG/DL (ref 8.6–10.3)
CHLORIDE SERPL-SCNC: 102 MMOL/L (ref 98–107)
CHOLEST SERPL-MCNC: 151 MG/DL (ref 0–199)
CHOLESTEROL/HDL RATIO: 2.5
CO2 SERPL-SCNC: 26 MMOL/L (ref 21–32)
CREAT SERPL-MCNC: 0.83 MG/DL (ref 0.5–1.3)
EGFRCR SERPLBLD CKD-EPI 2021: >90 ML/MIN/1.73M*2
EOSINOPHIL # BLD AUTO: 0.06 X10*3/UL (ref 0–0.4)
EOSINOPHIL NFR BLD AUTO: 1.1 %
ERYTHROCYTE [DISTWIDTH] IN BLOOD BY AUTOMATED COUNT: 13.2 % (ref 11.5–14.5)
GLUCOSE SERPL-MCNC: 92 MG/DL (ref 74–99)
HCT VFR BLD AUTO: 41.7 % (ref 41–52)
HDLC SERPL-MCNC: 60 MG/DL
HGB BLD-MCNC: 13.2 G/DL (ref 13.5–17.5)
IMM GRANULOCYTES # BLD AUTO: 0.03 X10*3/UL (ref 0–0.5)
IMM GRANULOCYTES NFR BLD AUTO: 0.6 % (ref 0–0.9)
LDLC SERPL CALC-MCNC: 65 MG/DL
LYMPHOCYTES # BLD AUTO: 1.37 X10*3/UL (ref 0.8–3)
LYMPHOCYTES NFR BLD AUTO: 25.2 %
MCH RBC QN AUTO: 31.6 PG (ref 26–34)
MCHC RBC AUTO-ENTMCNC: 31.7 G/DL (ref 32–36)
MCV RBC AUTO: 100 FL (ref 80–100)
MONOCYTES # BLD AUTO: 0.52 X10*3/UL (ref 0.05–0.8)
MONOCYTES NFR BLD AUTO: 9.6 %
NEUTROPHILS # BLD AUTO: 3.43 X10*3/UL (ref 1.6–5.5)
NEUTROPHILS NFR BLD AUTO: 63.1 %
NON HDL CHOLESTEROL: 91 MG/DL (ref 0–149)
NRBC BLD-RTO: 0 /100 WBCS (ref 0–0)
PLATELET # BLD AUTO: 147 X10*3/UL (ref 150–450)
POTASSIUM SERPL-SCNC: 4.9 MMOL/L (ref 3.5–5.3)
PROT SERPL-MCNC: 7.2 G/DL (ref 6.4–8.2)
PSA SERPL-MCNC: 0.32 NG/ML
RBC # BLD AUTO: 4.18 X10*6/UL (ref 4.5–5.9)
SODIUM SERPL-SCNC: 137 MMOL/L (ref 136–145)
TRIGL SERPL-MCNC: 129 MG/DL (ref 0–149)
VLDL: 26 MG/DL (ref 0–40)
WBC # BLD AUTO: 5.4 X10*3/UL (ref 4.4–11.3)

## 2024-08-19 PROCEDURE — 1160F RVW MEDS BY RX/DR IN RCRD: CPT | Performed by: FAMILY MEDICINE

## 2024-08-19 PROCEDURE — 1159F MED LIST DOCD IN RCRD: CPT | Performed by: FAMILY MEDICINE

## 2024-08-19 PROCEDURE — 85025 COMPLETE CBC W/AUTO DIFF WBC: CPT

## 2024-08-19 PROCEDURE — 80053 COMPREHEN METABOLIC PANEL: CPT

## 2024-08-19 PROCEDURE — G0103 PSA SCREENING: HCPCS

## 2024-08-19 PROCEDURE — 3078F DIAST BP <80 MM HG: CPT | Performed by: FAMILY MEDICINE

## 2024-08-19 PROCEDURE — 1123F ACP DISCUSS/DSCN MKR DOCD: CPT | Performed by: FAMILY MEDICINE

## 2024-08-19 PROCEDURE — 99214 OFFICE O/P EST MOD 30 MIN: CPT | Performed by: FAMILY MEDICINE

## 2024-08-19 PROCEDURE — 3008F BODY MASS INDEX DOCD: CPT | Performed by: FAMILY MEDICINE

## 2024-08-19 PROCEDURE — 1036F TOBACCO NON-USER: CPT | Performed by: FAMILY MEDICINE

## 2024-08-19 PROCEDURE — 80061 LIPID PANEL: CPT

## 2024-08-19 PROCEDURE — 3075F SYST BP GE 130 - 139MM HG: CPT | Performed by: FAMILY MEDICINE

## 2024-08-19 PROCEDURE — 36415 COLL VENOUS BLD VENIPUNCTURE: CPT

## 2024-08-19 RX ORDER — ROSUVASTATIN CALCIUM 5 MG/1
5 TABLET, COATED ORAL DAILY
Qty: 90 TABLET | Refills: 2 | Status: SHIPPED | OUTPATIENT
Start: 2024-08-19

## 2024-08-19 RX ORDER — METOPROLOL SUCCINATE 50 MG/1
50 TABLET, EXTENDED RELEASE ORAL DAILY
Qty: 90 TABLET | Refills: 2 | Status: SHIPPED | OUTPATIENT
Start: 2024-08-19

## 2024-08-19 ASSESSMENT — ENCOUNTER SYMPTOMS
DYSPHORIC MOOD: 0
VOMITING: 0
BLOOD IN STOOL: 0
SHORTNESS OF BREATH: 0
HEMATURIA: 0
NAUSEA: 0
ACTIVITY CHANGE: 0
CONSTIPATION: 0
FATIGUE: 0
SORE THROAT: 0
BACK PAIN: 0
ADENOPATHY: 0
NECK PAIN: 1
HEADACHES: 0
COUGH: 0
DIARRHEA: 0
MYALGIAS: 0
NUMBNESS: 0
NERVOUS/ANXIOUS: 0
DIZZINESS: 0
EYE DISCHARGE: 0
HYPERTENSION: 1
BRUISES/BLEEDS EASILY: 0
BLURRED VISION: 1
ABDOMINAL PAIN: 0
DIFFICULTY URINATING: 0
WEAKNESS: 0
CHEST TIGHTNESS: 0
ARTHRALGIAS: 1

## 2024-08-20 ENCOUNTER — TELEPHONE (OUTPATIENT)
Dept: PRIMARY CARE | Facility: CLINIC | Age: 74
End: 2024-08-20
Payer: COMMERCIAL

## 2024-08-20 DIAGNOSIS — R53.82 CHRONIC FATIGUE: ICD-10-CM

## 2024-08-20 DIAGNOSIS — D64.9 ANEMIA, UNSPECIFIED TYPE: ICD-10-CM

## 2024-08-20 NOTE — TELEPHONE ENCOUNTER
----- Message from Wilson Montes sent at 8/20/2024  7:51 AM EDT -----  Please call the patient regarding his abnormal result.  Stable anemia  Suggest hematology and gastroenterology evaluation to determine cause

## 2024-10-17 ENCOUNTER — APPOINTMENT (OUTPATIENT)
Dept: GASTROENTEROLOGY | Facility: CLINIC | Age: 74
End: 2024-10-17
Payer: COMMERCIAL

## 2024-12-17 ENCOUNTER — APPOINTMENT (OUTPATIENT)
Dept: PRIMARY CARE | Facility: CLINIC | Age: 74
End: 2024-12-17
Payer: COMMERCIAL

## 2025-03-19 RX ORDER — POLYETHYLENE GLYCOL 3350, SODIUM CHLORIDE, SODIUM BICARBONATE, POTASSIUM CHLORIDE 420; 11.2; 5.72; 1.48 G/4L; G/4L; G/4L; G/4L
4000 POWDER, FOR SOLUTION ORAL ONCE
Qty: 4000 ML | Refills: 0 | Status: SHIPPED | OUTPATIENT
Start: 2025-03-19 | End: 2025-03-19

## 2025-03-26 ENCOUNTER — PREP FOR PROCEDURE (OUTPATIENT)
Dept: GASTROENTEROLOGY | Age: 75
End: 2025-03-26

## 2025-03-26 RX ORDER — SODIUM CHLORIDE 0.9 % (FLUSH) 0.9 %
5-40 SYRINGE (ML) INJECTION PRN
Status: CANCELLED | OUTPATIENT
Start: 2025-03-26

## 2025-03-26 RX ORDER — SODIUM CHLORIDE 0.9 % (FLUSH) 0.9 %
5-40 SYRINGE (ML) INJECTION EVERY 12 HOURS SCHEDULED
Status: CANCELLED | OUTPATIENT
Start: 2025-03-26

## 2025-03-26 RX ORDER — SODIUM CHLORIDE 9 MG/ML
INJECTION, SOLUTION INTRAVENOUS PRN
Status: CANCELLED | OUTPATIENT
Start: 2025-03-26

## 2025-03-26 RX ORDER — SODIUM CHLORIDE 9 MG/ML
INJECTION, SOLUTION INTRAVENOUS CONTINUOUS
Status: CANCELLED | OUTPATIENT
Start: 2025-03-26

## 2025-04-07 ENCOUNTER — ANESTHESIA EVENT (OUTPATIENT)
Dept: ENDOSCOPY | Age: 75
End: 2025-04-07
Payer: MEDICARE

## 2025-04-07 NOTE — ANESTHESIA PRE PROCEDURE
Department of Anesthesiology  Preprocedure Note       Name:  Zheng Blas   Age:  74 y.o.  :  1950                                          MRN:  [id number]         Date:  2025      Surgeon: Surgeon(s):  Mami Horton MD    Procedure: Procedure(s):  COLORECTAL CANCER SCREENING, HIGH RISK    Medications prior to admission:   Prior to Admission medications    Medication Sig Start Date End Date Taking? Authorizing Provider   citalopram (CELEXA) 20 MG tablet TAKE 1 TABLET BY MOUTH ONCE DAILY 23   Pallavi Doty MD   lisinopril (PRINIVIL;ZESTRIL) 20 MG tablet  22   Pallavi Doty MD   CPAP Machine MISC by Does not apply route New CPAP with 5 cm .    His CPAP is over 5 yrs old.  Dx. MUNA 22   Holger Hdz MD   metoprolol succinate (TOPROL XL) 50 MG extended release tablet Take 1 tablet by mouth daily 19   Darshan Calhoun MD   amLODIPine (NORVASC) 5 MG tablet Take 1 tablet by mouth daily 19   Darshan Calhoun MD   rosuvastatin (CRESTOR) 5 MG tablet Take 1 tablet by mouth daily 19   Darshan Calhoun MD   Respiratory Therapy Supplies SHELIA New CPAP mask and supplies 10/23/18   Holger Hdz MD       Current medications:    No current facility-administered medications for this encounter.     Current Outpatient Medications   Medication Sig Dispense Refill    citalopram (CELEXA) 20 MG tablet TAKE 1 TABLET BY MOUTH ONCE DAILY      lisinopril (PRINIVIL;ZESTRIL) 20 MG tablet       CPAP Machine MISC by Does not apply route New CPAP with 5 cm .    His CPAP is over 5 yrs old.  Dx. MUNA 1 each 0    metoprolol succinate (TOPROL XL) 50 MG extended release tablet Take 1 tablet by mouth daily 90 tablet 3    amLODIPine (NORVASC) 5 MG tablet Take 1 tablet by mouth daily 90 tablet 3    rosuvastatin (CRESTOR) 5 MG tablet Take 1 tablet by mouth daily 90 tablet 3    Respiratory Therapy Supplies SHELIA New CPAP mask and supplies 1 Device 0       Allergies:    Allergies

## 2025-04-08 ENCOUNTER — ANESTHESIA (OUTPATIENT)
Dept: ENDOSCOPY | Age: 75
End: 2025-04-08
Payer: MEDICARE

## 2025-04-08 ENCOUNTER — HOSPITAL ENCOUNTER (OUTPATIENT)
Age: 75
Setting detail: OUTPATIENT SURGERY
Discharge: HOME | End: 2025-04-08
Attending: SPECIALIST | Admitting: SPECIALIST
Payer: MEDICARE

## 2025-04-08 VITALS
BODY MASS INDEX: 28.58 KG/M2 | DIASTOLIC BLOOD PRESSURE: 69 MMHG | TEMPERATURE: 98.4 F | RESPIRATION RATE: 18 BRPM | HEART RATE: 59 BPM | OXYGEN SATURATION: 96 % | WEIGHT: 211 LBS | SYSTOLIC BLOOD PRESSURE: 141 MMHG | HEIGHT: 72 IN

## 2025-04-08 DIAGNOSIS — Z86.0100 HISTORY OF COLON POLYPS: ICD-10-CM

## 2025-04-08 PROCEDURE — 88305 TISSUE EXAM BY PATHOLOGIST: CPT

## 2025-04-08 PROCEDURE — 2709999900 HC NON-CHARGEABLE SUPPLY: Performed by: SPECIALIST

## 2025-04-08 PROCEDURE — 3700000001 HC ADD 15 MINUTES (ANESTHESIA): Performed by: SPECIALIST

## 2025-04-08 PROCEDURE — 6360000002 HC RX W HCPCS: Performed by: NURSE ANESTHETIST, CERTIFIED REGISTERED

## 2025-04-08 PROCEDURE — 2580000003 HC RX 258

## 2025-04-08 PROCEDURE — 3700000000 HC ANESTHESIA ATTENDED CARE: Performed by: SPECIALIST

## 2025-04-08 PROCEDURE — 7100000010 HC PHASE II RECOVERY - FIRST 15 MIN: Performed by: SPECIALIST

## 2025-04-08 PROCEDURE — 7100000011 HC PHASE II RECOVERY - ADDTL 15 MIN: Performed by: SPECIALIST

## 2025-04-08 PROCEDURE — 3609027000 HC COLONOSCOPY: Performed by: SPECIALIST

## 2025-04-08 PROCEDURE — 2500000003 HC RX 250 WO HCPCS: Performed by: SPECIALIST

## 2025-04-08 PROCEDURE — 45380 COLONOSCOPY AND BIOPSY: CPT | Performed by: SPECIALIST

## 2025-04-08 RX ORDER — SODIUM CHLORIDE 0.9 % (FLUSH) 0.9 %
5-40 SYRINGE (ML) INJECTION PRN
Status: DISCONTINUED | OUTPATIENT
Start: 2025-04-08 | End: 2025-04-08 | Stop reason: HOSPADM

## 2025-04-08 RX ORDER — SODIUM CHLORIDE 9 MG/ML
INJECTION, SOLUTION INTRAVENOUS
Status: COMPLETED
Start: 2025-04-08 | End: 2025-04-08

## 2025-04-08 RX ORDER — PROPOFOL 10 MG/ML
INJECTION, EMULSION INTRAVENOUS
Status: DISCONTINUED | OUTPATIENT
Start: 2025-04-08 | End: 2025-04-08 | Stop reason: SDUPTHER

## 2025-04-08 RX ORDER — SODIUM CHLORIDE 9 MG/ML
INJECTION, SOLUTION INTRAVENOUS CONTINUOUS
Status: DISCONTINUED | OUTPATIENT
Start: 2025-04-08 | End: 2025-04-08 | Stop reason: HOSPADM

## 2025-04-08 RX ORDER — SODIUM CHLORIDE 9 MG/ML
INJECTION, SOLUTION INTRAVENOUS PRN
Status: DISCONTINUED | OUTPATIENT
Start: 2025-04-08 | End: 2025-04-08 | Stop reason: HOSPADM

## 2025-04-08 RX ORDER — SODIUM CHLORIDE 0.9 % (FLUSH) 0.9 %
5-40 SYRINGE (ML) INJECTION EVERY 12 HOURS SCHEDULED
Status: DISCONTINUED | OUTPATIENT
Start: 2025-04-08 | End: 2025-04-08 | Stop reason: HOSPADM

## 2025-04-08 RX ADMIN — PROPOFOL 50 MG: 10 INJECTION, EMULSION INTRAVENOUS at 12:03

## 2025-04-08 RX ADMIN — SODIUM CHLORIDE: 0.9 INJECTION, SOLUTION INTRAVENOUS at 11:19

## 2025-04-08 RX ADMIN — PROPOFOL 50 MG: 10 INJECTION, EMULSION INTRAVENOUS at 11:58

## 2025-04-08 RX ADMIN — PROPOFOL 50 MG: 10 INJECTION, EMULSION INTRAVENOUS at 12:06

## 2025-04-08 RX ADMIN — PROPOFOL 50 MG: 10 INJECTION, EMULSION INTRAVENOUS at 12:13

## 2025-04-08 RX ADMIN — PROPOFOL 50 MG: 10 INJECTION, EMULSION INTRAVENOUS at 12:10

## 2025-04-08 RX ADMIN — PROPOFOL 100 MG: 10 INJECTION, EMULSION INTRAVENOUS at 11:56

## 2025-04-08 RX ADMIN — PROPOFOL 50 MG: 10 INJECTION, EMULSION INTRAVENOUS at 12:00

## 2025-04-08 RX ADMIN — SODIUM CHLORIDE: 9 INJECTION, SOLUTION INTRAVENOUS at 11:19

## 2025-04-08 ASSESSMENT — PAIN - FUNCTIONAL ASSESSMENT
PAIN_FUNCTIONAL_ASSESSMENT: 0-10
PAIN_FUNCTIONAL_ASSESSMENT: 0-10

## 2025-04-08 NOTE — H&P
Patient Name: Zheng Blas  : 1950  MRN: [id number]  DATE: 25      ENDOSCOPY  History and Physical    Procedure:    [x] Diagnostic Colonoscopy       [] Screening Colonoscopy  [] EGD      [] ERCP      [] EUS       [] Other    [x] Previous office notes/History and Physical reviewed from the patients chart. Please see EMR for further details of HPI. I have examined the patient's status immediately prior to the procedure and:      Indications/HPI:    []Abdominal Pain  []Cancer- GI/Lung  []Fhx of colon CA/polyps  []History of Polyps  []Nguyen’s   []Melena  []Abnormal Imaging  []Dysphagia    []Persistent Pneumonia  []Anemia  []Food Impaction  []History of Polyps  []GI Bleed  []Pulmonary nodule/Mass  []Change in bowel habits []Heartburn/Reflux  []Rectal Bleed (BRBPR)  []Chest Pain - Non Cardiac []Heme (+) Stoo  l[]Ulcers  []Constipation  []Hemoptysis   []Varices  []Diarrhea  []Hypoxemia  []Nausea/Vomiting  []Screening   []Crohns/Colitis  []Other: History of colon polyps status post resection    Anesthesia:   [x] MAC [] Moderate Sedation   [] General   [] None     ROS: 12 pt Review of Symptoms was negative unless mentioned above    Medications:   Prior to Admission medications    Medication Sig Start Date End Date Taking? Authorizing Provider   citalopram (CELEXA) 20 MG tablet TAKE 1 TABLET BY MOUTH ONCE DAILY 23  Yes Provider, MD Pallavi   lisinopril (PRINIVIL;ZESTRIL) 20 MG tablet  22  Yes Provider, MD Pallavi   metoprolol succinate (TOPROL XL) 50 MG extended release tablet Take 1 tablet by mouth daily 19  Yes Darshan Calhoun MD   amLODIPine (NORVASC) 5 MG tablet Take 1 tablet by mouth daily 19  Yes Darshan Calhoun MD   rosuvastatin (CRESTOR) 5 MG tablet Take 1 tablet by mouth daily 19  Yes Darshan Calhoun MD   CPAP Machine MISC by Does not apply route New CPAP with 5 cm .    His CPAP is over 5 yrs old.  Dx. MUNA 22   Holger Hdz MD   Respiratory  at age 20s x 2 yrs / 1ppd   Vaping Use    Vaping status: Never Used   Substance Use Topics    Alcohol use: Yes     Alcohol/week: 0.0 standard drinks of alcohol     Comment: 3-4 glasses wine per day    Drug use: No       Vital Signs:   Vitals:    04/08/25 1115   BP: (!) 177/86   Pulse: 83   Resp: 18   Temp: 98.4 °F (36.9 °C)   SpO2: 97%        Physical Exam:  Cardiac:  [x]WNL  []Comments:  Pulmonary:  [x]WNL   []Comments:   Neuro/Mental Status:  [x]WNL  []Comments:  Abdominal:  [x]WNL    []Comments:  Other:   []WNL  []Comments:    Informed Consent:  The risks and benefits of the procedure have been discussed with either the patient or if they cannot consent, their representative.    Assessment:  Patient examined and appropriate for planned sedation and procedure.     Plan:  Proceed with planned sedation and procedure as above.    Mami Horton MD  11:51 AM

## 2025-04-08 NOTE — ANESTHESIA POSTPROCEDURE EVALUATION
Department of Anesthesiology  Postprocedure Note    Patient: Zheng Blas  MRN: [id number]  YOB: 1950  Date of evaluation: 4/8/2025    Procedure Summary       Date: 04/08/25 Room / Location: [facility] OR 01 / [facility]    Anesthesia Start: 1153 Anesthesia Stop: 1218    Procedure: COLORECTAL CANCER SCREENING, HIGH RISK Diagnosis:       History of colon polyps      (History of colon polyps [Z86.0100])    Surgeons: Mami Horton MD Responsible Provider: Darryl Meneses APRN - CRNA    Anesthesia Type: MAC ASA Status: 2            Anesthesia Type: No value filed.    Veroan Phase I: Verona Score: 10    Verona Phase II:      Anesthesia Post Evaluation    Patient location during evaluation: bedside  Patient participation: complete - patient participated  Level of consciousness: awake and awake and alert  Airway patency: patent  Nausea & Vomiting: no nausea and no vomiting  Cardiovascular status: hemodynamically stable  Respiratory status: acceptable  Hydration status: stable  Pain management: adequate        No notable events documented.

## 2025-04-10 ENCOUNTER — RESULTS FOLLOW-UP (OUTPATIENT)
Dept: GASTROENTEROLOGY | Age: 75
End: 2025-04-10

## (undated) DEVICE — SUTURE VCRL + SZ 2-0 L36IN ABSRB UD L36MM CT-1 1/2 CIR VCP945H

## (undated) DEVICE — COVER LT HNDL BLU PLAS

## (undated) DEVICE — Device: Brand: ENDO SMARTCAP

## (undated) DEVICE — SUTURE PDS II SZ 1 L36IN ABSRB VLT CT L40MM 1/2 CIR TAPR Z359T

## (undated) DEVICE — Z DISCONTINUED USE 2744636  DRESSING AQUACEL 14 IN ALG W3.5XL14IN POLYUR FLM CVR W/ HYDRCOLL

## (undated) DEVICE — GLOVE ORANGE PI 8   MSG9080

## (undated) DEVICE — NEEDLE SPNL 18GA L3.5IN W/ QNCKE SHARPER BVL DURA CLICK

## (undated) DEVICE — RELOAD STPL L75MM OPN STPL H4.5MM CLS STPL H2MM WIRE

## (undated) DEVICE — HIGH FLOW TIP

## (undated) DEVICE — STAPLER INT 75MM CUT LN L73MM STPL LN L77MM LNAR B-FORM

## (undated) DEVICE — SIPS DUAL 2 MINUTE TIP

## (undated) DEVICE — SINGLE PORT MANIFOLD: Brand: NEPTUNE 2

## (undated) DEVICE — BRUSH ENDO CLN L90.5IN SHTH DIA1.7MM BRIST DIA5-7MM 2-6MM

## (undated) DEVICE — ENDO CARRY-ON PROCEDURE KIT: Brand: ENDO CARRY-ON PROCEDURE KIT

## (undated) DEVICE — SUTURE NONABSORBABLE BRAIDED 4-0 SH 30 IN BLK PERMA HND K831H

## (undated) DEVICE — 35 ML SYRINGE LUER-LOCK TIP: Brand: MONOJECT

## (undated) DEVICE — INTENDED FOR TISSUE SEPARATION, AND OTHER PROCEDURES THAT REQUIRE A SHARP SURGICAL BLADE TO PUNCTURE OR CUT.: Brand: BARD-PARKER ® CARBON RIB-BACK BLADES

## (undated) DEVICE — SUTURE VCRL SZ 1 L36IN ABSRB UD L36MM CT-1 1/2 CIR J947H

## (undated) DEVICE — TTL1LYR 16FR10ML 100%SIL TMPST TR: Brand: MEDLINE

## (undated) DEVICE — PATIENT RETURN ELECTRODE, SINGLE-USE, CONTACT QUALITY MONITORING, ADULT, WITH 9FT CORD, FOR PATIENTS WEIGING OVER 33LBS. (15KG): Brand: MEGADYNE

## (undated) DEVICE — TIBURON TOP SHEET: Brand: CONVERTORS

## (undated) DEVICE — DRAPE EQUIP TRNSPRT CONTAINMENT FOR BK TAB

## (undated) DEVICE — SUTURE VCRL SZ 2-0 L36IN ABSRB UD L36MM CT-1 1/2 CIR J945H

## (undated) DEVICE — PACK PROCEDURE SURG TOT HIP DRP

## (undated) DEVICE — ADHESIVE SKIN CLSR 0.7ML TOP DERMBND ADV

## (undated) DEVICE — SUTURE MCRYL + SZ 3-0 L36IN ABSRB UD CT-1 L36MM 1/2 CIR MCP944H

## (undated) DEVICE — NEPTUNE E-SEP 165MM SUCTION SLEEVE: Brand: NEPTUNE E-SEP

## (undated) DEVICE — SUTURE TICRN BR BL NDL C-20 SZ 5 30 8886302779

## (undated) DEVICE — GLOVE ORANGE PI 7 1/2   MSG9075

## (undated) DEVICE — LABEL MED MINI W/ MARKER

## (undated) DEVICE — PRECISOR HOT DISPOSABLE HOT BIOPSY FORCEPS, 2.8 MM X 230 CM, OLYMPUS CORD: Brand: PRECISOR

## (undated) DEVICE — ELECTRODE PT RET AD L9FT HI MOIST COND ADH HYDRGEL CORDED

## (undated) DEVICE — 3M™ STERI-STRIP™ REINFORCED ADHESIVE SKIN CLOSURES, R1547, 1/2 IN X 4 IN (12 MM X 100 MM), 6 STRIPS/ENVELOPE: Brand: 3M™ STERI-STRIP™

## (undated) DEVICE — FORCEPS BX L240CM JAW DIA2.8MM L CAP W/ NDL MIC MESH TOOTH

## (undated) DEVICE — TOWEL,OR,DSP,ST,BLUE,STD,4/PK,20PK/CS: Brand: MEDLINE

## (undated) DEVICE — BLADE SAW W098XL276IN THK0025IN CUT THK0039IN REPL SAG

## (undated) DEVICE — SUTURE PERMA-HAND SZ 2-0 L30IN NONABSORBABLE BLK L26MM SH K833H

## (undated) DEVICE — GOWN,SIRUS,NONRNF,SETINSLV,2XL,18/CS: Brand: MEDLINE

## (undated) DEVICE — 3M™ STERI-DRAPE™ U-DRAPE 1015: Brand: STERI-DRAPE™

## (undated) DEVICE — E-Z CLEAN, NON-STICK, PTFE COATED, ELECTROSURGICAL BLADE ELECTRODE, 6.5 INCH (16.5 CM): Brand: MEGADYNE

## (undated) DEVICE — SUTURE VCRL SZ 4-0 L27IN ABSRB UD L19MM PS-2 3/8 CIR PRIM J426H

## (undated) DEVICE — TRAP POLYP BALEEN

## (undated) DEVICE — TIBURON SPLIT SHEET: Brand: CONVERTORS

## (undated) DEVICE — SEALER ENDOSCP NANO COAT OPN DIV CRV L JAW LIGASURE IMPACT

## (undated) DEVICE — PACK,LAPAROTOMY,NO GOWNS: Brand: MEDLINE

## (undated) DEVICE — T4 HOOD

## (undated) DEVICE — SPONGE,LAP,18"X18",DLX,XR,ST,5/PK,40/PK: Brand: MEDLINE

## (undated) DEVICE — STAPLER INT STPL LN H1.8-4.8XL60MM THCK TISS 2 ROW 8 FIRING

## (undated) DEVICE — MAT FLR SURG QUICKWICK 28X54 IN DISP

## (undated) DEVICE — BIT DRL L30MM MOD FLEX DISP FOR ACET CUP SCR

## (undated) DEVICE — BINDER ABD UNISX 9IN 62IN L AND XL UNIV

## (undated) DEVICE — GLOVE ORANGE PI 8 1/2   MSG9085

## (undated) DEVICE — SUTURE VCRL SZ 0 L36IN ABSRB UD L36MM CT-1 1/2 CIR J946H

## (undated) DEVICE — CHLORAPREP 26ML ORANGE

## (undated) DEVICE — NEPTUNE E-SEP SMOKE EVACUATION PENCIL, COATED, 70MM BLADE, PUSH BUTTON SWITCH: Brand: NEPTUNE E-SEP

## (undated) DEVICE — SNARE ENDOSCP AD L240CM LOOP W10MM SHTH DIA2.4MM RND INSUL

## (undated) DEVICE — DRAPE,MEDI-SLUSH,STERILE: Brand: MEDLINE

## (undated) DEVICE — TUBE SET 96 MM 64 MM H2O PERISTALTIC STD AUX CHANNEL

## (undated) DEVICE — APPLICATOR MEDICATED 26 CC SOLUTION HI LT ORNG CHLORAPREP

## (undated) DEVICE — MARKER SURG SKIN GENTIAN VLT REG TIP W/ 6IN RUL

## (undated) DEVICE — TUBING, SUCTION, 1/4" X 10', STRAIGHT: Brand: MEDLINE

## (undated) DEVICE — SPONGE DRN W4XL4IN RAYON/POLYESTER 6 PLY NONWOVEN PRECUT

## (undated) DEVICE — COUNTER NDL 40 COUNT HLD 70 FOAM BLK ADH W/ MAG

## (undated) DEVICE — TUBING IRRIGATION 140/160/180/190 SER GI ENDOSCP SMARTCAP

## (undated) DEVICE — 3M™ IOBAN™ 2 ANTIMICROBIAL INCISE DRAPE 6650EZ: Brand: IOBAN™ 2

## (undated) DEVICE — GOWN,SIRUS,POLYRNF,BRTHSLV,LG,30/CS: Brand: MEDLINE

## (undated) DEVICE — WOUND RETRACTOR AND PROTECTOR: Brand: ALEXIS O WOUND PROTECTOR-RETRACTOR